# Patient Record
Sex: MALE | Race: WHITE | NOT HISPANIC OR LATINO | Employment: OTHER | URBAN - METROPOLITAN AREA
[De-identification: names, ages, dates, MRNs, and addresses within clinical notes are randomized per-mention and may not be internally consistent; named-entity substitution may affect disease eponyms.]

---

## 2017-12-01 ENCOUNTER — CONVERSION ENCOUNTER (OUTPATIENT)
Dept: FAMILY MEDICINE CLINIC | Facility: CLINIC | Age: 62
End: 2017-12-01

## 2017-12-01 LAB
ALBUMIN SERPL-MCNC: 3.2 G/DL (ref 3.6–5.1)
ALBUMIN/GLOB SERPL: ABNORMAL {RATIO} (ref 1–2.1)
ALP SERPL-CCNC: 117 UNITS/L (ref 40–115)
ALT SERPL-CCNC: 53 UNITS/L (ref 9–60)
AST SERPL-CCNC: 57 UNITS/L (ref 10–35)
BASOPHILS NFR BLD AUTO: 1 %
BILIRUB SERPL-MCNC: 0.7 MG/DL (ref 0.2–1.2)
BUN SERPL-MCNC: 13 MG/DL (ref 7–25)
BUN/CREAT SERPL: ABNORMAL (ref 6–22)
CALCIUM SERPL-MCNC: 9.2 MG/DL (ref 8.6–10.2)
CHLORIDE SERPL-SCNC: 107 MMOL/L (ref 98–110)
CHOLEST SERPL-MCNC: 87 MG/DL (ref 125–200)
CHOLEST/HDLC SERPL: ABNORMAL {RATIO}
CONV % HGB A1C: ABNORMAL %
CONV CO2: 26 MMOL/L (ref 21–33)
CONV NEUTROPHILS/100 LEUKOCYTES IN BODY FLUID BY MANUAL COUNT: 72 %
CONV TOTAL PROTEIN: 6.4 G/DL (ref 6.2–8.3)
CREAT UR-MCNC: 0.9 MG/DL (ref 0.76–1.46)
EOSINOPHIL # BLD AUTO: 1 %
EOSINOPHIL # BLD AUTO: ABNORMAL 10*3/MM3 (ref 15–500)
ERYTHROCYTE [DISTWIDTH] IN BLOOD BY AUTOMATED COUNT: 15.5 % (ref 11–15)
GLOBULIN UR ELPH-MCNC: ABNORMAL G/DL (ref 2.1–3.7)
GLUCOSE SERPL-MCNC: 99 MG/DL (ref 65–99)
HCT VFR BLD AUTO: 37.8 % (ref 38.5–50)
HDLC SERPL-MCNC: 25 MG/DL
HGB BLD-MCNC: 12.7 G/DL (ref 13.2–17.1)
LDLC SERPL CALC-MCNC: 42 MG/DL
LYMPHOCYTES # BLD AUTO: ABNORMAL 10*3/MM3 (ref 850–3900)
LYMPHOCYTES NFR BLD AUTO: 21 %
MCH RBC QN AUTO: 30.9 PG (ref 27–33)
MCHC RBC AUTO-ENTMCNC: ABNORMAL % (ref 32–36)
MCV RBC AUTO: 91.8 FL (ref 80–100)
MONOCYTES # BLD AUTO: ABNORMAL 10*3/MICROLITER (ref 200–950)
MONOCYTES NFR BLD AUTO: 6 %
NEUTROPHILS # BLD AUTO: ABNORMAL 10*3/MM3 (ref 1500–7800)
PLATELET # BLD AUTO: ABNORMAL 10*3/MM3 (ref 140–400)
PMV BLD AUTO: 8.6 FL (ref 7.5–11.5)
POTASSIUM SERPL-SCNC: 4.4 MMOL/L (ref 3.5–5.3)
PSA SERPL-MCNC: 1.3 NG/ML
RBC # BLD AUTO: ABNORMAL 10*6/MM3 (ref 4.2–5.8)
SODIUM SERPL-SCNC: 139 MMOL/L (ref 135–146)
TRIGL SERPL-MCNC: 99 MG/DL
TSH SERPL-ACNC: 3.51 MIU/L (ref 0.4–4.5)
WBC # BLD AUTO: ABNORMAL 10*3/MM3 (ref 3.8–10.8)

## 2017-12-14 LAB
CONV CREATININE URINE, RANDOM: 134 MG/DL (ref 20–370)
CONV MICROALBUM.,U,RANDOM: 1 MG/DL
HGB S BLD QL SOLY: NORMAL MCG/MG

## 2019-02-05 LAB
BASOPHILS # BLD AUTO: ABNORMAL 10*3/MM3 (ref 0–200)
BASOPHILS NFR BLD AUTO: 0.2 %
CONV NEUTROPHILS/100 LEUKOCYTES IN BODY FLUID BY MANUAL COUNT: 68.3 %
EOSINOPHIL # BLD AUTO: 1.4 %
EOSINOPHIL # BLD AUTO: ABNORMAL 10*3/MM3 (ref 15–500)
ERYTHROCYTE [DISTWIDTH] IN BLOOD BY AUTOMATED COUNT: 13.6 % (ref 11–15)
HCT VFR BLD AUTO: 39.2 % (ref 38.5–50)
HGB BLD-MCNC: 12.7 G/DL (ref 13.2–17.1)
LYMPHOCYTES # BLD AUTO: ABNORMAL 10*3/MM3 (ref 850–3900)
LYMPHOCYTES NFR BLD AUTO: 24.4 %
MCH RBC QN AUTO: 28.6 PG (ref 27–33)
MCHC RBC AUTO-ENTMCNC: ABNORMAL % (ref 32–36)
MCV RBC AUTO: 88.3 FL (ref 80–100)
MONOCYTES # BLD AUTO: ABNORMAL 10*3/MICROLITER (ref 200–950)
MONOCYTES NFR BLD AUTO: 5.7 %
NEUTROPHILS # BLD AUTO: ABNORMAL 10*3/MM3 (ref 1500–7800)
PLATELET # BLD AUTO: ABNORMAL 10*3/MM3 (ref 140–400)
PMV BLD AUTO: 10.6 FL (ref 7.5–12.5)
PSA SERPL-MCNC: 0.3 NG/ML
RBC # BLD AUTO: ABNORMAL 10*6/MM3 (ref 4.2–5.8)
WBC # BLD AUTO: ABNORMAL K/UL (ref 3.8–10.8)

## 2019-07-03 ENCOUNTER — TELEPHONE (OUTPATIENT)
Dept: FAMILY MEDICINE CLINIC | Facility: CLINIC | Age: 64
End: 2019-07-03

## 2019-07-08 RX ORDER — DICYCLOMINE HYDROCHLORIDE 10 MG/1
10 CAPSULE ORAL 4 TIMES DAILY PRN
Qty: 120 CAPSULE | Refills: 5 | Status: SHIPPED | OUTPATIENT
Start: 2019-07-08 | End: 2019-11-13 | Stop reason: SDUPTHER

## 2019-08-05 ENCOUNTER — TELEPHONE (OUTPATIENT)
Dept: FAMILY MEDICINE CLINIC | Facility: CLINIC | Age: 64
End: 2019-08-05

## 2019-08-05 RX ORDER — GLIMEPIRIDE 4 MG/1
2 TABLET ORAL 2 TIMES DAILY
Qty: 90 TABLET | Refills: 1 | Status: SHIPPED | OUTPATIENT
Start: 2019-08-05 | End: 2019-11-13 | Stop reason: SDUPTHER

## 2019-08-27 RX ORDER — HYDROCHLOROTHIAZIDE 12.5 MG/1
12.5 CAPSULE, GELATIN COATED ORAL DAILY
Qty: 30 CAPSULE | Refills: 5 | Status: SHIPPED | OUTPATIENT
Start: 2019-08-27 | End: 2019-11-13 | Stop reason: SDUPTHER

## 2019-09-03 ENCOUNTER — OFFICE VISIT (OUTPATIENT)
Dept: FAMILY MEDICINE CLINIC | Facility: CLINIC | Age: 64
End: 2019-09-03

## 2019-09-03 VITALS
DIASTOLIC BLOOD PRESSURE: 67 MMHG | BODY MASS INDEX: 41.86 KG/M2 | WEIGHT: 299 LBS | SYSTOLIC BLOOD PRESSURE: 130 MMHG | TEMPERATURE: 98 F | HEART RATE: 87 BPM | OXYGEN SATURATION: 95 % | HEIGHT: 71 IN

## 2019-09-03 DIAGNOSIS — E11.42 DIABETIC PERIPHERAL NEUROPATHY ASSOCIATED WITH TYPE 2 DIABETES MELLITUS (HCC): ICD-10-CM

## 2019-09-03 DIAGNOSIS — I73.9 PAD (PERIPHERAL ARTERY DISEASE) (HCC): Chronic | ICD-10-CM

## 2019-09-03 DIAGNOSIS — G62.9 NEUROPATHY: ICD-10-CM

## 2019-09-03 DIAGNOSIS — R60.0 EDEMA OF LOWER EXTREMITY: ICD-10-CM

## 2019-09-03 DIAGNOSIS — E78.2 MIXED HYPERLIPIDEMIA: ICD-10-CM

## 2019-09-03 DIAGNOSIS — M79.604 PAIN IN BOTH LOWER EXTREMITIES: ICD-10-CM

## 2019-09-03 DIAGNOSIS — E11.9 TYPE 2 DIABETES MELLITUS WITHOUT COMPLICATION, WITH LONG-TERM CURRENT USE OF INSULIN (HCC): Primary | ICD-10-CM

## 2019-09-03 DIAGNOSIS — M79.605 PAIN IN BOTH LOWER EXTREMITIES: ICD-10-CM

## 2019-09-03 DIAGNOSIS — D64.9 ANEMIA, UNSPECIFIED TYPE: ICD-10-CM

## 2019-09-03 DIAGNOSIS — I73.9 PERIPHERAL VASCULAR DISEASE (HCC): ICD-10-CM

## 2019-09-03 DIAGNOSIS — M79.89 LEG SWELLING: ICD-10-CM

## 2019-09-03 DIAGNOSIS — Z79.4 TYPE 2 DIABETES MELLITUS WITHOUT COMPLICATION, WITH LONG-TERM CURRENT USE OF INSULIN (HCC): Primary | ICD-10-CM

## 2019-09-03 PROBLEM — I10 HYPERTENSION, BENIGN: Status: ACTIVE | Noted: 2017-09-27

## 2019-09-03 PROBLEM — R79.89 ABNORMAL LIVER FUNCTION TESTS: Status: ACTIVE | Noted: 2017-09-27

## 2019-09-03 PROBLEM — I25.10 CORONARY ARTERY DISEASE: Status: ACTIVE | Noted: 2017-10-24

## 2019-09-03 PROBLEM — E78.5 HYPERLIPIDEMIA: Status: ACTIVE | Noted: 2017-09-27

## 2019-09-03 PROCEDURE — 99214 OFFICE O/P EST MOD 30 MIN: CPT | Performed by: NURSE PRACTITIONER

## 2019-09-03 RX ORDER — GABAPENTIN 300 MG/1
600 CAPSULE ORAL 3 TIMES DAILY
Qty: 90 CAPSULE | Refills: 3 | Status: SHIPPED | OUTPATIENT
Start: 2019-09-03 | End: 2019-11-13 | Stop reason: SDUPTHER

## 2019-09-03 NOTE — PROGRESS NOTES
Subjective   Haider Holland is a 64 y.o. male.     64-year-old white male with history of type 2 diabetes, MI, peripheral neuropathy, Bilateral PAD and chronic lower extremity edema who comes in today with complaints of worsening left lower extremity swelling.  In April I treated him for cellulitis and the redness to clear up but leg remains elevated and he does have PAD bilaterally with discoloration of skin and scaly skin.  Left lower extremity is swollen more than normal and I am doing vascular studies on the left lower extremity.  I am also increasing his gabapentin to 600 mg 3 times daily for his peripheral neuropathy  Blood sugars are normally under 130 in the morning  Blood pressure 130/66 heart rate 86 he denies any chest pain, dyspnea, tachycardia or dizziness   Patient have recent colonoscopy with polyps and is to repeat in 3 years     ABIs/  Venous Doppler left lower extremity   increase gabapentin to 600 mg three times a day         The following portions of the patient's history were reviewed and updated as appropriate: allergies, current medications, past family history, past medical history, past social history, past surgical history and problem list.    Review of Systems   Constitutional: Negative.    HENT: Negative.    Respiratory: Negative.    Cardiovascular: Positive for leg swelling.   Gastrointestinal: Negative.    Genitourinary: Negative.    Musculoskeletal: Negative.    Skin: Negative.    Neurological:        Peripheral neuropathy   Psychiatric/Behavioral: Negative.        Objective   Physical Exam   Constitutional: He is oriented to person, place, and time. He appears well-developed and well-nourished.   Cardiovascular: Normal rate and regular rhythm.   Pulmonary/Chest: Effort normal and breath sounds normal.   Abdominal: Soft. Bowel sounds are normal.   Musculoskeletal: He exhibits edema.   Swelling left lower extremity with PAD and discoloration noted bilaterally With scaly skin    Neurological: He is alert and oriented to person, place, and time.   Skin: Skin is warm and dry.   See musculoskeletal   Psychiatric: He has a normal mood and affect.         Assessment/Plan   Haider was seen today for leg swelling, leg pain and peripheral neuropathy.    Diagnoses and all orders for this visit:    Type 2 diabetes mellitus without complication, with long-term current use of insulin (CMS/[facility])  -     Comprehensive Metabolic Panel  -     Hemoglobin A1c    Anemia, unspecified type  -     CBC (No Diff)    Diabetic peripheral neuropathy associated with type 2 diabetes mellitus (CMS/[facility])  -     gabapentin (NEURONTIN) 300 MG capsule; Take 2 capsules by mouth 3 (Three) Times a Day.    Mixed hyperlipidemia  -     Lipid Panel With LDL / HDL Ratio    Pain in both lower extremities  -     Doppler Arterial Multi Level Lower Extremity - Bilateral CAR; Future  -     Duplex Venous Lower Extremity - Left CAR; Future    Leg swelling  -     Doppler Arterial Multi Level Lower Extremity - Bilateral CAR; Future  -     Duplex Venous Lower Extremity - Left CAR; Future    Peripheral vascular disease (CMS/HCC)   -     Doppler Arterial Multi Level Lower Extremity - Bilateral CAR; Future    Neuropathy    PAD (peripheral artery disease) (CMS/[facility])    Edema of lower extremity

## 2019-09-04 LAB
ALBUMIN SERPL-MCNC: 4.1 G/DL (ref 3.6–4.8)
ALBUMIN/GLOB SERPL: 1.4 {RATIO} (ref 1.2–2.2)
ALP SERPL-CCNC: 72 IU/L (ref 39–117)
ALT SERPL-CCNC: 41 IU/L (ref 0–44)
AST SERPL-CCNC: 46 IU/L (ref 0–40)
BILIRUB SERPL-MCNC: 0.8 MG/DL (ref 0–1.2)
BUN SERPL-MCNC: 16 MG/DL (ref 8–27)
BUN/CREAT SERPL: 14 (ref 10–24)
CALCIUM SERPL-MCNC: 9.6 MG/DL (ref 8.6–10.2)
CHLORIDE SERPL-SCNC: 104 MMOL/L (ref 96–106)
CHOLEST SERPL-MCNC: 137 MG/DL (ref 100–199)
CO2 SERPL-SCNC: 20 MMOL/L (ref 20–29)
CREAT SERPL-MCNC: 1.16 MG/DL (ref 0.76–1.27)
ERYTHROCYTE [DISTWIDTH] IN BLOOD BY AUTOMATED COUNT: 15.7 % (ref 12.3–15.4)
GLOBULIN SER CALC-MCNC: 3 G/DL (ref 1.5–4.5)
GLUCOSE SERPL-MCNC: 103 MG/DL (ref 65–99)
HBA1C MFR BLD: 6.1 % (ref 4.8–5.6)
HCT VFR BLD AUTO: 40.6 % (ref 37.5–51)
HDLC SERPL-MCNC: 32 MG/DL
HGB BLD-MCNC: 13.4 G/DL (ref 13–17.7)
LDLC SERPL CALC-MCNC: 62 MG/DL (ref 0–99)
LDLC/HDLC SERPL: 1.9 RATIO (ref 0–3.6)
MCH RBC QN AUTO: 29.6 PG (ref 26.6–33)
MCHC RBC AUTO-ENTMCNC: 33 G/DL (ref 31.5–35.7)
MCV RBC AUTO: 90 FL (ref 79–97)
PLATELET # BLD AUTO: 156 X10E3/UL (ref 150–450)
POTASSIUM SERPL-SCNC: 4.7 MMOL/L (ref 3.5–5.2)
PROT SERPL-MCNC: 7.1 G/DL (ref 6–8.5)
RBC # BLD AUTO: 4.53 X10E6/UL (ref 4.14–5.8)
SODIUM SERPL-SCNC: 140 MMOL/L (ref 134–144)
TRIGL SERPL-MCNC: 215 MG/DL (ref 0–149)
VLDLC SERPL CALC-MCNC: 43 MG/DL (ref 5–40)
WBC # BLD AUTO: 6.5 X10E3/UL (ref 3.4–10.8)

## 2019-11-05 DIAGNOSIS — M79.605 PAIN IN BOTH LOWER EXTREMITIES: ICD-10-CM

## 2019-11-05 DIAGNOSIS — M79.604 PAIN IN BOTH LOWER EXTREMITIES: ICD-10-CM

## 2019-11-05 DIAGNOSIS — M79.89 LEG SWELLING: ICD-10-CM

## 2019-11-05 DIAGNOSIS — I73.9 PERIPHERAL VASCULAR DISEASE (HCC): ICD-10-CM

## 2019-11-13 DIAGNOSIS — E11.42 DIABETIC PERIPHERAL NEUROPATHY ASSOCIATED WITH TYPE 2 DIABETES MELLITUS (HCC): ICD-10-CM

## 2019-11-13 RX ORDER — HYDROCHLOROTHIAZIDE 12.5 MG/1
12.5 CAPSULE, GELATIN COATED ORAL DAILY
Qty: 90 CAPSULE | Refills: 1 | Status: SHIPPED | OUTPATIENT
Start: 2019-11-13 | End: 2019-12-13

## 2019-11-13 RX ORDER — GLIMEPIRIDE 2 MG/1
2 TABLET ORAL 2 TIMES DAILY
Qty: 180 TABLET | Refills: 1 | Status: SHIPPED | OUTPATIENT
Start: 2019-11-13 | End: 2020-01-13 | Stop reason: SDUPTHER

## 2019-11-13 RX ORDER — DICYCLOMINE HYDROCHLORIDE 10 MG/1
10 CAPSULE ORAL 4 TIMES DAILY PRN
Qty: 360 CAPSULE | Refills: 1 | Status: SHIPPED | OUTPATIENT
Start: 2019-11-13 | End: 2020-01-13 | Stop reason: SDUPTHER

## 2019-11-13 RX ORDER — LISINOPRIL 10 MG/1
10 TABLET ORAL DAILY
Qty: 90 TABLET | Refills: 1 | Status: SHIPPED | OUTPATIENT
Start: 2019-11-13 | End: 2020-01-13 | Stop reason: SDUPTHER

## 2019-11-13 RX ORDER — ASPIRIN 81 MG/1
81 TABLET ORAL 2 TIMES DAILY
Qty: 180 TABLET | Refills: 1 | Status: SHIPPED | OUTPATIENT
Start: 2019-11-13 | End: 2020-01-13 | Stop reason: SDUPTHER

## 2019-11-13 RX ORDER — GABAPENTIN 300 MG/1
600 CAPSULE ORAL 3 TIMES DAILY
Qty: 540 CAPSULE | Refills: 1 | Status: SHIPPED | OUTPATIENT
Start: 2019-11-13 | End: 2020-01-20

## 2019-11-19 ENCOUNTER — TELEPHONE (OUTPATIENT)
Dept: FAMILY MEDICINE CLINIC | Facility: CLINIC | Age: 64
End: 2019-11-19

## 2019-11-26 ENCOUNTER — OFFICE VISIT (OUTPATIENT)
Dept: ENDOCRINOLOGY | Facility: CLINIC | Age: 64
End: 2019-11-26

## 2019-11-26 VITALS
WEIGHT: 305 LBS | SYSTOLIC BLOOD PRESSURE: 132 MMHG | HEIGHT: 71 IN | BODY MASS INDEX: 42.7 KG/M2 | DIASTOLIC BLOOD PRESSURE: 75 MMHG | HEART RATE: 77 BPM | OXYGEN SATURATION: 97 %

## 2019-11-26 DIAGNOSIS — E11.42 DIABETIC PERIPHERAL NEUROPATHY ASSOCIATED WITH TYPE 2 DIABETES MELLITUS (HCC): ICD-10-CM

## 2019-11-26 DIAGNOSIS — I10 HYPERTENSION, BENIGN: ICD-10-CM

## 2019-11-26 DIAGNOSIS — E11.65 UNCONTROLLED TYPE 2 DIABETES MELLITUS WITH HYPERGLYCEMIA (HCC): Primary | ICD-10-CM

## 2019-11-26 DIAGNOSIS — E78.2 MIXED HYPERLIPIDEMIA: ICD-10-CM

## 2019-11-26 PROCEDURE — 99214 OFFICE O/P EST MOD 30 MIN: CPT | Performed by: INTERNAL MEDICINE

## 2019-11-26 PROCEDURE — G0009 ADMIN PNEUMOCOCCAL VACCINE: HCPCS | Performed by: INTERNAL MEDICINE

## 2019-11-26 PROCEDURE — 90670 PCV13 VACCINE IM: CPT | Performed by: INTERNAL MEDICINE

## 2019-11-26 NOTE — PROGRESS NOTES
Endocrine Progress Note Outpatient     Patient Care Team:  Chantel Pineda APRN as PCP - General    Chief Complaint: Follow up type 2 diabetes    HPI: 64-year-old male with history of type 2 diabetes, hypertension, hyperlipidemia and obesity is here for follow-up.  For type 2 diabetes he is currently on Janumet XR 50/thousand, 2 tablets p.o. daily, glimepiride 2 mg twice a day, Invokana 100 medium daily and Humalog mix 75/2520 units twice a day.  He did bring in blood sugar records for review and they are running mostly between 100-1 60.  He does not have low blood sugars.  Is trying his best for his diet.  He has not gotten flu or pneumonia vaccine.  He had an eye exam 2 weeks ago.  Hypertension: Well-controlled  Hyperlipidemia: Is currently not taking any cholesterol-lowering medications.  Obesity: He is advised to continue to work on his diet and activity.    Past Medical History:   Diagnosis Date   • Abnormal liver function tests 09/27/2017   • Bilateral lower extremity edema 02/05/2019   • BMI 40.0-44.9, adult (CMS/[facility]) 02/05/2019   • CAD (coronary artery disease) 10/24/2017   • Cellulitis of leg, right 04/02/2019   • Chest pain 10/24/2017   • Cough 02/06/2018   • Diabetes mellitus, type II (CMS/[facility]) 10/24/2017   • Hyperlipidemia 09/27/2017   • Hypertension, benign 09/27/2017   • MI (myocardial infarction) (CMS/[facility]) 2008   • Neuropathy 08/07/2018   • Peripheral neuropathy    • Screening for colon cancer 02/05/2019   • Screening PSA (prostate specific antigen) 02/05/2019   • Skin abscess 09/18/2018   • Sore throat 02/06/2018   • Special screening for malignant neoplasm of prostate 11/30/2017   • Type 2 diabetes, uncontrolled, with neuropathy (CMS/[facility]) 09/27/2017    DM typeII with peripheral neuropathy uncontrolled   • URI, acute 02/06/2018   • Well adult exam 11/30/2017       Social History     Socioeconomic History   • Marital status:      Spouse name: Not on file   • Number of children: Not on file   •  Years of education: Not on file   • Highest education level: Not on file   Tobacco Use   • Smoking status: Never Smoker   Substance and Sexual Activity   • Alcohol use: No     Frequency: Never   • Drug use: No       Family History   Problem Relation Age of Onset   • Diabetes Mother    • Heart disease Mother    • Hypertension Mother    • Heart disease Father    • Diabetes Brother        No Known Allergies    ROS:   Constitutional:  Denies fatigue, tiredness.    Eyes:  Denies change in visual acuity   HENT:  Denies nasal congestion or sore throat   Respiratory: denies cough, shortness of breath.   Cardiovascular:  denies chest pain, edema   GI:  Denies abdominal pain, nausea, vomiting.   Musculoskeletal:  Denies back pain or joint pain   Integument:  Denies dry skin and rash   Neurologic:  Denies headache, focal weakness or sensory changes   Endocrine:  Denies polyuria or polydipsia   Psychiatric:  Denies depression or anxiety      Vitals:    11/26/19 1517   BP: 132/75   Pulse: 77   SpO2: 97%       Physical Exam:  GEN: NAD, conversant, obese  EYES: EOMI, PERRL, no conjunctival erythema  NECK: no thyromegaly, full ROM   CV: RRR, no murmurs/rubs/gallops, no peripheral edema  LUNG: CTAB, no wheezes/rales/ronchi  SKIN: no rashes, no acanthosis  MSK: no deformities, full ROM of all extremities  NEURO: no tremors, DTR normal  PSYCH: AOX3, appropriate mood, affect normal  Feet: Has calluses on both feet, no ulcer      Results Review:     I reviewed the patient's new clinical results.    Lab Results   Component Value Date    HGBA1C 6.1 (H) 09/03/2019      Lab Results   Component Value Date    GLUCOSE 99 12/01/2017    BUN 16 09/03/2019    CREATININE 1.16 09/03/2019    EGFRIFNONA 66 09/03/2019    EGFRIFAFRI 77 09/03/2019    BCR 14 09/03/2019    K 4.7 09/03/2019    CO2 20 09/03/2019    CALCIUM 9.6 09/03/2019    PROTENTOTREF 7.1 09/03/2019    ALBUMIN 4.1 09/03/2019    LABIL2 1.4 09/03/2019    AST 46 (H) 09/03/2019    ALT 41  09/03/2019    CHOL 87 (L) 12/01/2017    TRIG 215 (H) 09/03/2019    LDL 62 09/03/2019    HDL 32 (L) 09/03/2019     Lab Results   Component Value Date    TSH 3.51 12/01/2017         Medication Review: Reviewed.       Current Outpatient Medications:   •  aspirin (ASPIR) 81 MG EC tablet, Take 1 tablet by mouth 2 (Two) Times a Day. aspir-81  81MG oral tablet delayed release, Disp: 180 tablet, Rfl: 1  •  Canagliflozin (INVOKANA) 100 MG tablet, Take 1 tablet by mouth Daily., Disp: 90 tablet, Rfl: 1  •  dicyclomine (BENTYL) 10 MG capsule, Take 1 capsule by mouth 4 (Four) Times a Day As Needed (1-4 times a day as needed)., Disp: 360 capsule, Rfl: 1  •  gabapentin (NEURONTIN) 300 MG capsule, Take 2 capsules by mouth 3 (Three) Times a Day., Disp: 540 capsule, Rfl: 1  •  glimepiride (AMARYL) 2 MG tablet, Take 1 tablet by mouth 2 (Two) Times a Day. (Patient taking differently: Take 4 mg by mouth 2 (Two) Times a Day. Takes tablet twice a day), Disp: 180 tablet, Rfl: 1  •  glucose blood test strip, Check BS twice a day, Disp: 300 each, Rfl: 1  •  hydroCHLOROthiazide (MICROZIDE) 12.5 MG capsule, Take 1 capsule by mouth Daily., Disp: 90 capsule, Rfl: 1  •  insulin lispro protamine-insulin lispro (humaLOG 75-25) (75-25) 100 UNIT/ML suspension injection, Inject 25 Units under the skin into the appropriate area as directed 2 (Two) Times a Day With Meals., Disp: 15 mL, Rfl: 1  •  Insulin Pen Needle 32G X 4 MM misc, Use as directed with Insluin, Disp: 300 each, Rfl: 1  •  lisinopril (PRINIVIL,ZESTRIL) 10 MG tablet, Take 1 tablet by mouth Daily., Disp: 90 tablet, Rfl: 1  •  SITagliptin-metFORMIN HCl ER (JANUMET XR)  MG tablet, Take 1 tablet by mouth 2 (Two) Times a Day., Disp: , Rfl:       Assessment/Plan   1.  Diabetes mellitus type 2: Excellent control with A1c of 6.4%.  I have reviewed his blood sugar records.  At this time I will continue his current regimen and follow blood sugars and A1c.  I advised him to get annual eye exam  and flu vaccine however he does not want to get a flu shot but he is interested in getting pneumonia vaccine today.  Advised to always keep glucose source with him in case of low blood sugar.  2.  Hypertension: Well-controlled, continue current medication  3.  Hyperlipidemia his LDL is only 60, I will add atorvastatin 10 mg p.o. daily.  4.  Obesity: Advised to continue work on diet and activity and try to lose some weight.            Tono Allison MD FACE.    Much of the above report is an electronic transcription/translation of the spoken language to printed text using Dragon Software. As such, the subtleties and finesse of the spoken language may permit erroneous, or at times, nonsensical words or phrases to be inadvertently transcribed; thus changes may be made at a later date to rectify these errors.

## 2019-11-26 NOTE — PATIENT INSTRUCTIONS
Renew current medications  Always keep glucose source with you in case of low blood sugar  Follow-up in 6 months with labs  Always follow diet and activity  Always get annual eye exam.

## 2019-12-13 ENCOUNTER — OFFICE VISIT (OUTPATIENT)
Dept: FAMILY MEDICINE CLINIC | Facility: CLINIC | Age: 64
End: 2019-12-13

## 2019-12-13 VITALS
HEART RATE: 83 BPM | BODY MASS INDEX: 43.4 KG/M2 | OXYGEN SATURATION: 95 % | TEMPERATURE: 98 F | HEIGHT: 71 IN | DIASTOLIC BLOOD PRESSURE: 70 MMHG | WEIGHT: 310 LBS | RESPIRATION RATE: 20 BRPM | SYSTOLIC BLOOD PRESSURE: 131 MMHG

## 2019-12-13 DIAGNOSIS — E11.42 DIABETIC PERIPHERAL NEUROPATHY ASSOCIATED WITH TYPE 2 DIABETES MELLITUS (HCC): Primary | ICD-10-CM

## 2019-12-13 DIAGNOSIS — I25.10 CORONARY ARTERY DISEASE INVOLVING NATIVE CORONARY ARTERY OF NATIVE HEART, ANGINA PRESENCE UNSPECIFIED: ICD-10-CM

## 2019-12-13 DIAGNOSIS — I10 HYPERTENSION, BENIGN: ICD-10-CM

## 2019-12-13 DIAGNOSIS — E11.65 UNCONTROLLED TYPE 2 DIABETES MELLITUS WITH HYPERGLYCEMIA (HCC): ICD-10-CM

## 2019-12-13 DIAGNOSIS — R60.0 EDEMA OF LOWER EXTREMITY: ICD-10-CM

## 2019-12-13 PROBLEM — Z86.010 HISTORY OF COLON POLYPS: Status: ACTIVE | Noted: 2019-12-13

## 2019-12-13 PROBLEM — Z86.0100 HISTORY OF COLON POLYPS: Status: ACTIVE | Noted: 2019-12-13

## 2019-12-13 PROCEDURE — 99214 OFFICE O/P EST MOD 30 MIN: CPT | Performed by: FAMILY MEDICINE

## 2019-12-13 RX ORDER — INSULIN LISPRO 100 [IU]/ML
INJECTION, SUSPENSION SUBCUTANEOUS
Qty: 15 ML | Refills: 10 | Status: SHIPPED | OUTPATIENT
Start: 2019-12-13 | End: 2020-11-18

## 2019-12-13 RX ORDER — GABAPENTIN 600 MG/1
600 TABLET ORAL
Qty: 120 TABLET | Refills: 1 | Status: SHIPPED | OUTPATIENT
Start: 2019-12-13 | End: 2020-01-13 | Stop reason: SDUPTHER

## 2019-12-13 RX ORDER — BUMETANIDE 1 MG/1
1 TABLET ORAL DAILY
Qty: 30 TABLET | Refills: 0 | Status: SHIPPED | OUTPATIENT
Start: 2019-12-13 | End: 2020-01-13 | Stop reason: SDUPTHER

## 2019-12-30 DIAGNOSIS — R60.0 EDEMA OF LOWER EXTREMITY: ICD-10-CM

## 2020-01-11 NOTE — PROGRESS NOTES
Subjective   Haider Holland is a 64 y.o. male.   Chief Complaint   Patient presents with   • Follow-up     Follow up on testing for Carotid & Echo        History of Present Illness   Presents to the office today to follow-up on swelling, numbness in his feet and legs.  He had a 2D echocardiogram done at [place] on January 3.  It showed mild tricuspid regurgitation, calcified aortic valve with mild aortic stenosis, ejection fraction of 60%, no pericardial effusion, apparently no obvious diastolic dysfunction.    Lower extremity venous Doppler was negative for DVT.    Again his diabetes is managed by Dr. Allison.  It looks like labs were ordered for late November, but there are no results.  Last renal function was normal.      Last visit, we increased gabapentin and did trial of bumex.  Weight up 2 pounds since last visit, but he says the swelling in his feet is a lot better.  Increase in gabapentin has helped a lot.  The burning pain and numbness in his feet and legs has decreased.      Patient Active Problem List    Diagnosis Date Noted   • Mild nonproliferative diabetic retinopathy without macular edema associated with type 2 diabetes mellitus (CMS/[facility]) 01/20/2020     Note Last Updated: 1/20/2020     Dr. Mcleod  Local opto - Dr. 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     • Body mass index (BMI) of 40.0-44.9 in adult (CMS/[facility]) 02/05/2019   • Edema of lower extremity 02/05/2019   • Neuropathy 08/07/2018   • Coronary artery disease 10/24/2017     Note Last Updated: 12/13/2019     MI in 2008 and 2012.  Had cath with stent - 2017     • Uncontrolled type 2 diabetes mellitus with hyperglycemia (CMS/[facility])  10/24/2017     Note Last Updated: 1/11/2020     Managed by endocrinology     • Abnormal liver function tests 09/27/2017   • Diabetic peripheral neuropathy associated with type 2 diabetes mellitus (CMS/HCC) 09/27/2017   • Mixed hyperlipidemia 09/27/2017   • Hypertension, benign 09/27/2017           Past Surgical History:   Procedure Laterality Date   • CARDIAC CATHETERIZATION  11/27/2017, 2008    with stent in 2017   • CHOLECYSTECTOMY  12/11/2017   • EYE SURGERY  2019   • OTHER SURGICAL HISTORY      rt arm sx     Current Outpatient Medications on File Prior to Visit   Medication Sig   • aspirin (ASPIR) 81 MG EC tablet Take 1 tablet by mouth 2 (Two) Times a Day. aspir-81  81MG oral tablet delayed release   • bumetanide (BUMEX) 1 MG tablet Take 1 tablet by mouth Daily.   • Canagliflozin (INVOKANA) 100 MG tablet Take 1 tablet by mouth Daily.   • dicyclomine (BENTYL) 10 MG capsule Take 1 capsule by mouth 4 (Four) Times a Day As Needed (1-4 times a day as needed).   • gabapentin (NEURONTIN) 600 MG tablet Take 1 tablet by mouth 4 (Four) Times a Day.   • glimepiride (AMARYL) 2 MG tablet Take 1 tablet by mouth 2 (Two) Times a Day.   • glucose blood test strip Check BS twice a day   • HUMALOG MIX 75/25 KWIKPEN (75-25) 100 UNIT/ML suspension pen-injector pen INJECT 25 UNITS SUBCUTANEOUSLY INTO THE APPROPRIATE AREA AS DIRECTED TWICE DAILY WITH MEALS   • Insulin Pen Needle 32G X 4 MM misc Use as directed with Insluin   • lisinopril (PRINIVIL,ZESTRIL) 10 MG tablet Take 1 tablet by mouth Daily.   • SITagliptin-metFORMIN HCl ER (JANUMET XR)  MG tablet Take 1 tablet by mouth 2 (Two) Times a Day.   • [DISCONTINUED] gabapentin (NEURONTIN) 300 MG capsule Take 2 capsules by mouth 3 (Three) Times a Day.     No current facility-administered medications on file prior to visit.      No Known Allergies  Social History     Socioeconomic History   • Marital status:      Spouse name: Not on file   • Number of children: Not on file  "  • Years of education: Not on file   • Highest education level: Not on file   Tobacco Use   • Smoking status: Never Smoker   Substance and Sexual Activity   • Alcohol use: No     Frequency: Never   • Drug use: No     Family History   Problem Relation Age of Onset   • Diabetes Mother    • Heart disease Mother    • Hypertension Mother    • Heart disease Father    • Diabetes Brother      The following portions of the patient's history were reviewed and updated as appropriate: allergies, current medications, past family history, past surgical history and problem list.    Review of Systems   Constitutional: Negative for chills and fever.   Respiratory: Negative for shortness of breath.    Cardiovascular: Negative for chest pain.   Gastrointestinal: Negative for abdominal pain.       Objective   /69 (BP Location: Right arm, Patient Position: Sitting, Cuff Size: Large Adult)   Pulse 83   Temp 97.5 °F (36.4 °C) (Oral)   Resp 20   Ht 180.3 cm (71\")   Wt (!) 142 kg (312 lb 3.2 oz)   SpO2 97%   BMI 43.54 kg/m²   Physical Exam   Constitutional: He is oriented to person, place, and time. He appears well-developed and well-nourished.  Non-toxic appearance. No distress. He is obese.  HENT:   Head: Normocephalic and atraumatic.   Eyes: Conjunctivae and EOM are normal.   Neck: Normal range of motion. No JVD present.   Cardiovascular: Normal rate and regular rhythm. Exam reveals decreased pulses (in bilateral feet). Exam reveals no distant heart sounds.   Murmur heard.   Systolic (RUSB - loudest) murmur is present with a grade of 3/6.  Pulmonary/Chest: Effort normal. No respiratory distress. He has decreased breath sounds (o/w clear). He has no wheezes. He has no rhonchi. He has no rales.   Musculoskeletal: Normal range of motion. He exhibits edema (still with trace piting edema up to knees bilaterally- improved).   Extraordinarily dry skin on bilateral legs   Neurological: He is alert and oriented to person, place, and " time.   Skin: Skin is warm and dry. No rash noted.   Psychiatric: He has a normal mood and affect. His behavior is normal.               Assessment/Plan   Diagnoses and all orders for this visit:    1. Edema of lower extremity (Primary)  -     Basic metabolic panel; Future    2. Diabetic peripheral neuropathy associated with type 2 diabetes mellitus (CMS/HCC)    3. Coronary artery disease involving native coronary artery of native heart, angina presence unspecified    4. 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  Does have DPN.  Suspect this is the cause of the burning and numbness in his legs.  I suspect the swelling is due to venous insufficiency.  He is 312 pounds.  His last labs from September 2019 showed normal renal function.  We will continue both the Bumex once daily and the gabapentin 4 times daily.  Start lotion to bilateral legs.   Get BMP before next visit in 1 month  Keep f/u with endo for mgt of DM       Return in about 4 weeks (around 2/17/2020).    Call with any problems or concerns before next visit

## 2020-01-13 DIAGNOSIS — R60.0 EDEMA OF LOWER EXTREMITY: ICD-10-CM

## 2020-01-13 DIAGNOSIS — E11.42 DIABETIC PERIPHERAL NEUROPATHY ASSOCIATED WITH TYPE 2 DIABETES MELLITUS (HCC): ICD-10-CM

## 2020-01-13 RX ORDER — BUMETANIDE 1 MG/1
1 TABLET ORAL DAILY
Qty: 90 TABLET | Refills: 1 | Status: SHIPPED | OUTPATIENT
Start: 2020-01-13 | End: 2020-07-06 | Stop reason: SDUPTHER

## 2020-01-13 RX ORDER — GABAPENTIN 600 MG/1
600 TABLET ORAL 4 TIMES DAILY
Qty: 120 TABLET | Refills: 1 | Status: SHIPPED | OUTPATIENT
Start: 2020-01-13 | End: 2020-08-10 | Stop reason: SDUPTHER

## 2020-01-13 RX ORDER — ASPIRIN 81 MG/1
81 TABLET ORAL 2 TIMES DAILY
Qty: 180 TABLET | Refills: 1 | Status: SHIPPED | OUTPATIENT
Start: 2020-01-13 | End: 2022-05-26

## 2020-01-13 RX ORDER — LISINOPRIL 10 MG/1
10 TABLET ORAL DAILY
Qty: 90 TABLET | Refills: 1 | Status: SHIPPED | OUTPATIENT
Start: 2020-01-13 | End: 2020-05-15

## 2020-01-13 RX ORDER — GLIMEPIRIDE 2 MG/1
2 TABLET ORAL 2 TIMES DAILY
Qty: 180 TABLET | Refills: 1 | Status: SHIPPED | OUTPATIENT
Start: 2020-01-13 | End: 2020-04-10

## 2020-01-13 RX ORDER — DICYCLOMINE HYDROCHLORIDE 10 MG/1
10 CAPSULE ORAL 4 TIMES DAILY PRN
Qty: 360 CAPSULE | Refills: 1 | Status: SHIPPED | OUTPATIENT
Start: 2020-01-13 | End: 2020-04-10

## 2020-01-13 NOTE — TELEPHONE ENCOUNTER
Patient came into office and needs all meds except Janumet XR refilled. He also needs the increased dose of Gabapentin refilled. Meds are loaded and ready to send.     Last OV: 12-  Next OV: 1-  Last Labs: 11-

## 2020-01-14 DIAGNOSIS — E11.65 UNCONTROLLED TYPE 2 DIABETES MELLITUS WITH HYPERGLYCEMIA (HCC): Primary | ICD-10-CM

## 2020-01-20 ENCOUNTER — OFFICE VISIT (OUTPATIENT)
Dept: FAMILY MEDICINE CLINIC | Facility: CLINIC | Age: 65
End: 2020-01-20

## 2020-01-20 VITALS
WEIGHT: 312.2 LBS | SYSTOLIC BLOOD PRESSURE: 129 MMHG | DIASTOLIC BLOOD PRESSURE: 69 MMHG | RESPIRATION RATE: 20 BRPM | HEART RATE: 83 BPM | BODY MASS INDEX: 43.71 KG/M2 | OXYGEN SATURATION: 97 % | TEMPERATURE: 97.5 F | HEIGHT: 71 IN

## 2020-01-20 DIAGNOSIS — E11.42 DIABETIC PERIPHERAL NEUROPATHY ASSOCIATED WITH TYPE 2 DIABETES MELLITUS (HCC): ICD-10-CM

## 2020-01-20 DIAGNOSIS — I25.10 CORONARY ARTERY DISEASE INVOLVING NATIVE CORONARY ARTERY OF NATIVE HEART, ANGINA PRESENCE UNSPECIFIED: ICD-10-CM

## 2020-01-20 DIAGNOSIS — E11.628 DIABETIC DERMOPATHY ASSOCIATED WITH TYPE 2 DIABETES MELLITUS (HCC): ICD-10-CM

## 2020-01-20 DIAGNOSIS — R60.0 EDEMA OF LOWER EXTREMITY: Primary | ICD-10-CM

## 2020-01-20 PROBLEM — E11.3299 MILD NONPROLIFERATIVE DIABETIC RETINOPATHY WITHOUT MACULAR EDEMA ASSOCIATED WITH TYPE 2 DIABETES MELLITUS: Status: ACTIVE | Noted: 2020-01-20

## 2020-01-20 PROCEDURE — 99214 OFFICE O/P EST MOD 30 MIN: CPT | Performed by: FAMILY MEDICINE

## 2020-02-14 DIAGNOSIS — R60.0 EDEMA OF LOWER EXTREMITY: ICD-10-CM

## 2020-02-15 LAB
BUN SERPL-MCNC: 14 MG/DL (ref 8–27)
BUN/CREAT SERPL: 14 (ref 10–24)
CALCIUM SERPL-MCNC: 9.1 MG/DL (ref 8.6–10.2)
CHLORIDE SERPL-SCNC: 106 MMOL/L (ref 96–106)
CO2 SERPL-SCNC: 24 MMOL/L (ref 20–29)
CREAT SERPL-MCNC: 1.01 MG/DL (ref 0.76–1.27)
GLUCOSE SERPL-MCNC: 133 MG/DL (ref 65–99)
POTASSIUM SERPL-SCNC: 4.4 MMOL/L (ref 3.5–5.2)
SODIUM SERPL-SCNC: 143 MMOL/L (ref 134–144)

## 2020-02-17 ENCOUNTER — OFFICE VISIT (OUTPATIENT)
Dept: FAMILY MEDICINE CLINIC | Facility: CLINIC | Age: 65
End: 2020-02-17

## 2020-02-17 VITALS
HEART RATE: 70 BPM | HEIGHT: 71 IN | RESPIRATION RATE: 20 BRPM | WEIGHT: 308 LBS | SYSTOLIC BLOOD PRESSURE: 110 MMHG | BODY MASS INDEX: 43.12 KG/M2 | TEMPERATURE: 97.8 F | DIASTOLIC BLOOD PRESSURE: 63 MMHG | OXYGEN SATURATION: 97 %

## 2020-02-17 DIAGNOSIS — E11.42 DIABETIC PERIPHERAL NEUROPATHY ASSOCIATED WITH TYPE 2 DIABETES MELLITUS (HCC): Primary | ICD-10-CM

## 2020-02-17 DIAGNOSIS — M25.512 ACUTE PAIN OF LEFT SHOULDER: ICD-10-CM

## 2020-02-17 DIAGNOSIS — R60.0 EDEMA OF LOWER EXTREMITY: ICD-10-CM

## 2020-02-17 DIAGNOSIS — I87.2 VENOUS INSUFFICIENCY OF BOTH LOWER EXTREMITIES: ICD-10-CM
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
## 2020-02-17 DIAGNOSIS — R07.89 LEFT-SIDED CHEST WALL PAIN: ICD-10-CM

## 2020-02-17 PROCEDURE — 99214 OFFICE O/P EST MOD 30 MIN: CPT | Performed by: FAMILY MEDICINE

## 2020-02-17 NOTE — PROGRESS NOTES
Subjective   Haider Holland is a 64 y.o. male.   Chief Complaint   Patient presents with   • Leg Pain     Not hurting as bad; BLE is swollen, but not as bad.    • Follow-up     Follow up BMP result.    • Fall     Fell 1 week ago; (L) shoulder and (L) leg are hurting. No imaging; No hospital/ER visit.        History of Present Illness   Presents today for follow-up on pain and swelling in his legs.  Please see previous notes for details, but the pain appears to be on the basis of diabetic neuropathy.  At the last visit we increased his gabapentin.  His legs do not hurt nearly as bad after the increase in gabapentin at the last visit.  Can walk further before becomes painful.      Swelling in his legs appears to be on the basis of venous insufficiency.  He remains on daily Bumex.  Just had a BMP done 3 days ago.  Creatinine is stable at 1 with a GFR of 78.  Electrolytes are all normal potassium was 4.4.  It would certainly appear that he is tolerating the Bumex well.  Voiding a lot more.  Left leg still a little more swollen than right.      He fell a week ago on ice and landed on his left hip and shoulder.   No work-up for this.  Since then, pain with any movement.  Cannot abduct left arm.  Cannot cross left arm over body.    ? H/o dislocation of shoulder years ago.  Also has some tenderness along his left anterior chest wall.  Hip does not hurt now.  Able to walk without problems.      Patient Active Problem List    Diagnosis Date Noted   • Venous insufficiency of both lower extremities 02/17/2020   • Mild nonproliferative diabetic retinopathy without macular edema associated with type 2 diabetes mellitus (CMS/HCC) 01/20/2020     Note Last Updated: 1/20/2020     Dr. Mcleod  [facility] opto - Dr. 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     • Body mass index (BMI) of 40.0-44.9 in adult (CMS/[facility]) 02/05/2019   • Edema of lower extremity 02/05/2019   • Neuropathy 08/07/2018   • Coronary artery disease 10/24/2017     Note Last Updated: 12/13/2019     MI in 2008 and 2012.  Had cath with stent - 2017     • Uncontrolled type 2 diabetes mellitus with hyperglycemia (CMS/[facility]) 10/24/2017     Note Last Updated: 1/11/2020     Managed by endocrinology     • Abnormal liver function tests 09/27/2017   • Diabetic peripheral neuropathy associated with type 2 diabetes mellitus (CMS/[facility]) 09/27/2017   • Mixed hyperlipidemia 09/27/2017   • Hypertension, benign 09/27/2017           Past Surgical History:   Procedure Laterality Date   • CARDIAC CATHETERIZATION  11/27/2017, 2008    with stent in 2017   • CHOLECYSTECTOMY  12/11/2017   • EYE SURGERY  2019   • OTHER SURGICAL HISTORY      rt arm sx     Current Outpatient Medications on File Prior to Visit   Medication Sig   • aspirin (ASPIR) 81 MG EC tablet Take 1 tablet by mouth 2 (Two) Times a Day. aspir-81  81MG oral tablet delayed release   • bumetanide (BUMEX) 1 MG tablet Take 1 tablet by mouth Daily.   • Canagliflozin (INVOKANA) 100 MG tablet Take 1 tablet by mouth Daily.   • dicyclomine (BENTYL) 10 MG capsule Take 1 capsule by mouth 4 (Four) Times a Day As Needed (1-4 times a day as needed).   • gabapentin (NEURONTIN) 600 MG tablet Take 1 tablet by mouth 4 (Four) Times a Day.   • glimepiride (AMARYL) 2 MG tablet Take 1 tablet by mouth 2 (Two) Times a Day.   • glucose blood test strip Check BS twice a day   • HUMALOG MIX 75/25 KWIKPEN (75-25) 100 UNIT/ML suspension pen-injector pen INJECT 25 UNITS SUBCUTANEOUSLY INTO THE APPROPRIATE AREA AS DIRECTED TWICE DAILY WITH MEALS   • Insulin Pen Needle 32G X 4 MM misc Use as directed with Insluin   • lisinopril (PRINIVIL,ZESTRIL) 10 MG tablet Take 1  "tablet by mouth Daily.   • SITagliptin-metFORMIN HCl ER (JANUMET XR)  MG tablet Take 1 tablet by mouth 2 (Two) Times a Day.     No current facility-administered medications on file prior to visit.      No Known Allergies  Social History     Socioeconomic History   • Marital status:      Spouse name: Not on file   • Number of children: Not on file   • Years of education: Not on file   • Highest education level: Not on file   Tobacco Use   • Smoking status: Never Smoker   • Smokeless tobacco: Never Used   Substance and Sexual Activity   • Alcohol use: No     Frequency: Never   • Drug use: No   • Sexual activity: Defer     Family History   Problem Relation Age of Onset   • Diabetes Mother    • Heart disease Mother    • Hypertension Mother    • Heart disease Father    • Diabetes Brother      The following portions of the patient's history were reviewed and updated as appropriate: allergies, current medications, past family history, past medical history, past social history, past surgical history and problem list.    Review of Systems   Constitutional: Negative for chills and fever.   Respiratory: Negative for shortness of breath.    Cardiovascular: Negative for chest pain.   Gastrointestinal: Negative for abdominal pain.       Objective   /63 (BP Location: Right arm, Patient Position: Sitting, Cuff Size: Large Adult)   Pulse 70   Temp 97.8 °F (36.6 °C) (Oral)   Resp 20   Ht 180.3 cm (71\")   Wt (!) 140 kg (308 lb)   SpO2 97%   BMI 42.96 kg/m²   Physical Exam   Constitutional: He is oriented to person, place, and time. He appears well-developed and well-nourished.  Non-toxic appearance. No distress. He is obese.  HENT:   Head: Normocephalic and atraumatic.   Eyes: Conjunctivae and EOM are normal.   Neck: Normal range of motion. No JVD present.   Cardiovascular: Normal rate and regular rhythm. Exam reveals no distant heart sounds.   Murmur heard.   Systolic (RUSB - loudest) murmur is present with " a grade of 3/6.  Pulmonary/Chest: Effort normal. No respiratory distress. He has decreased breath sounds. He has no wheezes. He has no rhonchi. He has no rales.   Musculoskeletal: Normal range of motion. He exhibits edema (still with trace piting edema up to right knee and just a bit more edema up to the knee on the left.).   Tenderness to palpation around the left shoulder.  Positive impingement sign.  Extreme limitation of ability to abduct left upper arm.  No obvious deformity or suspected fracture.  Tenderness to palpation about the left anterior chest wall.  Some increased pain with AP compression.    No pain in the left hip to internal or external rotation.  No limitation of gait.   Neurological: He is alert and oriented to person, place, and time.   Skin: Skin is warm and dry. No rash noted.   Psychiatric: He has a normal mood and affect. His behavior is normal.         Orders Only on 02/14/2020   Component Date Value Ref Range Status   • Glucose 02/14/2020 133* 65 - 99 mg/dL Final   • BUN 02/14/2020 14  8 - 27 mg/dL Final   • Creatinine 02/14/2020 1.01  0.76 - 1.27 mg/dL Final   • eGFR Non African Am 02/14/2020 78  >59 mL/min/1.73 Final   • eGFR African Am 02/14/2020 90  >59 mL/min/1.73 Final   • BUN/Creatinine Ratio 02/14/2020 14  10 - 24 Final   • Sodium 02/14/2020 143  134 - 144 mmol/L Final   • Potassium 02/14/2020 4.4  3.5 - 5.2 mmol/L Final   • Chloride 02/14/2020 106  96 - 106 mmol/L Final   • Total CO2 02/14/2020 24  20 - 29 mmol/L Final   • Calcium 02/14/2020 9.1  8.6 - 10.2 mg/dL Final         Assessment/Plan   Diagnoses and all orders for this visit:    1. Diabetic peripheral neuropathy associated with type 2 diabetes mellitus (CMS/HCC) (Primary)    2. Edema of lower extremity    3. Venous insufficiency of both lower extremities    4. Acute pain of left shoulder  -     XR Shoulder 2+ View Left (In Office)    5. Left-sided chest wall pain  -     XR Ribs Left With PA Chest    6. Fall, initial  encounter  -     XR Shoulder 2+ View Left (In Office)  -     XR Ribs Left With PA Chest    Continue to follow with endocrinology for management of his diabetes.  Continue bumex for edema  X-ray of the left shoulder, chest, left ribs.  If he still has significant limitation in a few weeks after resting, may need to proceed with an MRI.       Return in about 3 weeks (around 3/9/2020), or if symptoms worsen or fail to improve. to recheck shoulder unless x-ray shows fracture    Call with any problems or concerns before next visit

## 2020-02-18 ENCOUNTER — TELEPHONE (OUTPATIENT)
Dept: FAMILY MEDICINE CLINIC | Facility: CLINIC | Age: 65
End: 2020-02-18

## 2020-02-18 NOTE — TELEPHONE ENCOUNTER
Called patient. Patient's identity verified. Advised him of xr results and to contact office within a week if not better. He voiced understanding.

## 2020-02-18 NOTE — TELEPHONE ENCOUNTER
----- Message from Cuca Aquino MD sent at 2/17/2020  7:02 PM EST -----  Please let patient know that his x-rays did not show any fracture of his shoulder or ribs.  He does have arthritis of the left shoulder that is quite likely to have caused impingement and possible injury to his rotator cuff.  If he does not have better range of motion in his shoulder in 1 week, call and let me know and I will proceed with ordering an MRI to look at his rotator cuff and tendons.  We do not have to wait until his planned follow-up visit to make this decision.  1 week will be long enough.  Thanks

## 2020-02-24 ENCOUNTER — TELEPHONE (OUTPATIENT)
Dept: FAMILY MEDICINE CLINIC | Facility: CLINIC | Age: 65
End: 2020-02-24

## 2020-02-24 DIAGNOSIS — M19.012 GLENOHUMERAL ARTHRITIS, LEFT: Primary | ICD-10-CM

## 2020-03-02 DIAGNOSIS — E11.65 UNCONTROLLED TYPE 2 DIABETES MELLITUS WITH HYPERGLYCEMIA (HCC): Primary | ICD-10-CM

## 2020-03-02 NOTE — TELEPHONE ENCOUNTER
Received fax from [employer] in [place] for clarification on patient's Test Strips. ICD 10 code is needed. Strips resent with ICD 10 code.

## 2020-03-05 ENCOUNTER — TELEPHONE (OUTPATIENT)
Dept: FAMILY MEDICINE CLINIC | Facility: CLINIC | Age: 65
End: 2020-03-05

## 2020-03-08 NOTE — PROGRESS NOTES
Subjective   Haider Holland is a 64 y.o. male.   Chief Complaint   Patient presents with   • Follow-up     (L) shoulder pain- unchanged- Pain aggrevated with certain positions/movements; Has not had MRI yet.        History of Present Illness   Presents today to follow-up on left shoulder pain.  At the last visit we did x-rays.  Left shoulder x-ray showed mild to moderate before meals and glenohumeral osteoarthritis.  There was a lot of subacromial spurring, as well.  Left rib series and x-ray were normal.  As his x-rays suggested a meal use it could promote rotator cuff tear or injury, MRI of his left shoulder was ordered.  It has not been done yet.  Still with pain in left shoulder if sleeps on right side.  Pain with sudden movements, especially when reaching up.   Had a fall 4-5 months ago - some injury to shoulder then.     His diabetes is followed by Dr. Allison.      Patient Active Problem List    Diagnosis Date Noted   • Venous insufficiency of both lower extremities 02/17/2020   • Mild nonproliferative diabetic retinopathy without macular edema associated with type 2 diabetes mellitus (CMS/[facility]) 01/20/2020     Note Last Updated: 1/20/2020     Dr. Mcleod  [facility]. 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     • Body mass index (BMI) of 40.0-44.9 in adult (CMS/[facility]) 02/05/2019   • Edema of lower extremity 02/05/2019   • Neuropathy 08/07/2018   • Coronary artery disease 10/24/2017     Note Last Updated: 12/13/2019     MI in 2008 and 2012.  Had cath with stent - 2017     • Uncontrolled type 2 diabetes mellitus with hyperglycemia (CMS/[facility]) 10/24/2017     Note Last Updated: 1/11/2020     Managed by  endocrinology     • Abnormal liver function tests 09/27/2017   • Diabetic peripheral neuropathy associated with type 2 diabetes mellitus (CMS/HCC) 09/27/2017   • Mixed hyperlipidemia 09/27/2017   • Hypertension, benign 09/27/2017           Past Surgical History:   Procedure Laterality Date   • CARDIAC CATHETERIZATION  11/27/2017, 2008    with stent in 2017   • CHOLECYSTECTOMY  12/11/2017   • EYE SURGERY  2019   • OTHER SURGICAL HISTORY      rt arm sx     Current Outpatient Medications on File Prior to Visit   Medication Sig   • aspirin (ASPIR) 81 MG EC tablet Take 1 tablet by mouth 2 (Two) Times a Day. aspir-81  81MG oral tablet delayed release   • bumetanide (BUMEX) 1 MG tablet Take 1 tablet by mouth Daily.   • Canagliflozin (INVOKANA) 100 MG tablet Take 1 tablet by mouth Daily.   • dicyclomine (BENTYL) 10 MG capsule Take 1 capsule by mouth 4 (Four) Times a Day As Needed (1-4 times a day as needed).   • gabapentin (NEURONTIN) 600 MG tablet Take 1 tablet by mouth 4 (Four) Times a Day.   • glimepiride (AMARYL) 2 MG tablet Take 1 tablet by mouth 2 (Two) Times a Day.   • glucose blood test strip Check BS twice a day [id number]   • HUMALOG MIX 75/25 KWIKPEN (75-25) 100 UNIT/ML suspension pen-injector pen INJECT 25 UNITS SUBCUTANEOUSLY INTO THE APPROPRIATE AREA AS DIRECTED TWICE DAILY WITH MEALS   • Insulin Pen Needle 32G X 4 MM misc Use as directed with Insluin   • lisinopril (PRINIVIL,ZESTRIL) 10 MG tablet Take 1 tablet by mouth Daily.   • SITagliptin-metFORMIN HCl ER (JANUMET XR)  MG tablet Take 1 tablet by mouth 2 (Two) Times a Day.     No current facility-administered medications on file prior to visit.      No Known Allergies  Social History     Socioeconomic History   • Marital status:      Spouse name: Not on file   • Number of children: Not on file   • Years of education: Not on file   • Highest education level: Not on file   Tobacco Use   • Smoking status: Never Smoker   • Smokeless tobacco: Never  "Used   Substance and Sexual Activity   • Alcohol use: No     Frequency: Never   • Drug use: No   • Sexual activity: Defer     Family History   Problem Relation Age of Onset   • Diabetes Mother    • Heart disease Mother    • Hypertension Mother    • Heart disease Father    • Diabetes Brother      The following portions of the patient's history were reviewed and updated as appropriate: allergies, current medications, past family history, past medical history, past social history, past surgical history and problem list.    Review of Systems   Constitutional: Negative for chills and fever.   Respiratory: Negative for shortness of breath.    Cardiovascular: Negative for chest pain.   Gastrointestinal: Negative for abdominal pain.       Objective   /65 (BP Location: Right arm, Patient Position: Sitting, Cuff Size: Large Adult)   Pulse 74   Temp 97.8 °F (36.6 °C) (Oral)   Resp 20   Ht 180.3 cm (71\")   Wt (!) 138 kg (305 lb)   SpO2 96%   BMI 42.54 kg/m²   Physical Exam   Constitutional: He is oriented to person, place, and time. He appears well-developed and well-nourished.   HENT:   Head: Normocephalic and atraumatic.   Eyes: Conjunctivae and EOM are normal.   Neck: Normal range of motion.   Cardiovascular: Normal rate.   Pulmonary/Chest: Effort normal.   Musculoskeletal: Normal range of motion.   + tenderness to anterior left shoulder.  Positive impingement sign on the left.  No obvious effusion of the shoulder, although there may be a little swelling.  Negative sulcus sign.  Positive winging of the left shoulder with abduction of the left arm.  Distal neuro vasculature is intact.  Biceps strength seems good when left shoulder is against the body.   Neurological: He is alert and oriented to person, place, and time.   Skin: Skin is warm and dry. No rash noted.   Psychiatric: He has a normal mood and affect. His behavior is normal.         Orders Only on 02/14/2020   Component Date Value Ref Range Status   • " Glucose 02/14/2020 133* 65 - 99 mg/dL Final   • BUN 02/14/2020 14  8 - 27 mg/dL Final   • Creatinine 02/14/2020 1.01  0.76 - 1.27 mg/dL Final   • eGFR Non African Am 02/14/2020 78  >59 mL/min/1.73 Final   • eGFR African Am 02/14/2020 90  >59 mL/min/1.73 Final   • BUN/Creatinine Ratio 02/14/2020 14  10 - 24 Final   • Sodium 02/14/2020 143  134 - 144 mmol/L Final   • Potassium 02/14/2020 4.4  3.5 - 5.2 mmol/L Final   • Chloride 02/14/2020 106  96 - 106 mmol/L Final   • Total CO2 02/14/2020 24  20 - 29 mmol/L Final   • Calcium 02/14/2020 9.1  8.6 - 10.2 mg/dL Final         Assessment/Plan   Diagnoses and all orders for this visit:    1. Acute pain of left shoulder (Primary)  -     Ambulatory Referral to Physical Therapy Evaluate and treat    2. Glenohumeral arthritis, left  -     Ambulatory Referral to Physical Therapy Evaluate and treat    Will do course of PT.  If no improvement, will pursue MRI further.  Based on his x-ray findings and clinical exam, strongly suspect rotator cuff damage, if not tear.  Hopefully therapy will improve symptoms, but we will see.  Continue heat to the shoulder,  Gentle stretching and judicious Tylenol.           Return in about 4 weeks (around 4/6/2020).  To recheck shoulder pain.    Call with any problems or concerns before next visit

## 2020-03-09 ENCOUNTER — OFFICE VISIT (OUTPATIENT)
Dept: FAMILY MEDICINE CLINIC | Facility: CLINIC | Age: 65
End: 2020-03-09

## 2020-03-09 VITALS
TEMPERATURE: 97.8 F | HEART RATE: 74 BPM | DIASTOLIC BLOOD PRESSURE: 65 MMHG | BODY MASS INDEX: 42.7 KG/M2 | SYSTOLIC BLOOD PRESSURE: 133 MMHG | HEIGHT: 71 IN | WEIGHT: 305 LBS | OXYGEN SATURATION: 96 % | RESPIRATION RATE: 20 BRPM

## 2020-03-09 DIAGNOSIS — M25.512 ACUTE PAIN OF LEFT SHOULDER: Primary | ICD-10-CM

## 2020-03-09 DIAGNOSIS — M19.012 GLENOHUMERAL ARTHRITIS, LEFT: ICD-10-CM

## 2020-03-09 PROCEDURE — 99213 OFFICE O/P EST LOW 20 MIN: CPT | Performed by: FAMILY MEDICINE

## 2020-03-16 ENCOUNTER — TELEPHONE (OUTPATIENT)
Dept: FAMILY MEDICINE CLINIC | Facility: CLINIC | Age: 65
End: 2020-03-16

## 2020-04-10 RX ORDER — DICYCLOMINE HYDROCHLORIDE 10 MG/1
CAPSULE ORAL
Qty: 360 CAPSULE | Refills: 3 | Status: SHIPPED | OUTPATIENT
Start: 2020-04-10 | End: 2020-11-24

## 2020-04-10 RX ORDER — PEN NEEDLE, DIABETIC 32GX 5/32"
NEEDLE, DISPOSABLE MISCELLANEOUS
Qty: 300 EACH | Refills: 3 | Status: SHIPPED | OUTPATIENT
Start: 2020-04-10 | End: 2021-04-23

## 2020-04-10 RX ORDER — GLIMEPIRIDE 2 MG/1
TABLET ORAL
Qty: 180 TABLET | Refills: 3 | Status: SHIPPED | OUTPATIENT
Start: 2020-04-10 | End: 2021-04-01

## 2020-04-10 RX ORDER — CANAGLIFLOZIN 100 MG/1
TABLET, FILM COATED ORAL
Qty: 90 TABLET | Refills: 3 | Status: SHIPPED | OUTPATIENT
Start: 2020-04-10 | End: 2021-04-14 | Stop reason: SDUPTHER

## 2020-05-15 RX ORDER — LISINOPRIL 10 MG/1
TABLET ORAL
Qty: 30 TABLET | Refills: 5 | Status: SHIPPED | OUTPATIENT
Start: 2020-05-15 | End: 2020-11-18

## 2020-05-21 DIAGNOSIS — E11.65 UNCONTROLLED TYPE 2 DIABETES MELLITUS WITH HYPERGLYCEMIA (HCC): ICD-10-CM

## 2020-05-21 DIAGNOSIS — E11.42 DIABETIC PERIPHERAL NEUROPATHY ASSOCIATED WITH TYPE 2 DIABETES MELLITUS (HCC): ICD-10-CM

## 2020-05-21 DIAGNOSIS — I10 HYPERTENSION, BENIGN: ICD-10-CM

## 2020-05-21 DIAGNOSIS — E78.2 MIXED HYPERLIPIDEMIA: ICD-10-CM

## 2020-05-22 ENCOUNTER — OFFICE VISIT (OUTPATIENT)
Dept: FAMILY MEDICINE CLINIC | Facility: CLINIC | Age: 65
End: 2020-05-22

## 2020-05-22 VITALS
WEIGHT: 309 LBS | OXYGEN SATURATION: 93 % | HEIGHT: 71 IN | TEMPERATURE: 97.6 F | DIASTOLIC BLOOD PRESSURE: 67 MMHG | HEART RATE: 108 BPM | SYSTOLIC BLOOD PRESSURE: 181 MMHG | BODY MASS INDEX: 43.26 KG/M2

## 2020-05-22 DIAGNOSIS — L03.116 CELLULITIS OF LEFT LEG: ICD-10-CM

## 2020-05-22 DIAGNOSIS — I10 HYPERTENSION, BENIGN: ICD-10-CM

## 2020-05-22 DIAGNOSIS — L30.1 DYSHYDROSIS: ICD-10-CM

## 2020-05-22 DIAGNOSIS — I73.9 PAD (PERIPHERAL ARTERY DISEASE) (HCC): Chronic | ICD-10-CM

## 2020-05-22 DIAGNOSIS — I25.10 CORONARY ARTERY DISEASE INVOLVING NATIVE CORONARY ARTERY OF NATIVE HEART, ANGINA PRESENCE UNSPECIFIED: ICD-10-CM

## 2020-05-22 DIAGNOSIS — R60.0 EDEMA OF LOWER EXTREMITY: Primary | ICD-10-CM

## 2020-05-22 PROCEDURE — 99213 OFFICE O/P EST LOW 20 MIN: CPT | Performed by: FAMILY MEDICINE

## 2020-05-22 RX ORDER — CEPHALEXIN 500 MG/1
500 CAPSULE ORAL 2 TIMES DAILY
Qty: 14 CAPSULE | Refills: 0 | Status: SHIPPED | OUTPATIENT
Start: 2020-05-22 | End: 2020-05-28

## 2020-05-22 RX ORDER — AMMONIUM LACTATE 12 G/100G
CREAM TOPICAL DAILY
Qty: 385 G | Refills: 2 | Status: SHIPPED | OUTPATIENT
Start: 2020-05-22

## 2020-05-22 NOTE — PROGRESS NOTES
Subjective   Haider Holland is a 65 y.o. male.   Chief Complaint   Patient presents with   • Leg Swelling       History of Present Illness     Comes in today because of drainage that is developed on his left leg.  He has had chronically swollen bilateral legs due to venous insufficiency.  For about the past month he has had cracked, eroded skin on the front of his left lower leg.  It is red and will ooze clear fluid from time to time.  He put some triple antibiotic ointment on it and it burned.  He has a diagnosis of peripheral arterial disease, but he had normal Dopplers in September 2019.    He has diabetes.  Had labs drawn for his endocrinologist this morning.  Sees him next week.  The labs from this morning are not back.  He had a creatinine of 1.03 months ago.    Has high blood pressure-typically around 130 systolic is normal for him.  That has been the average at his past few office visits.    Coronary artery disease-had a stent in 2017.  No chest pains or shortness of breath.        Patient Active Problem List    Diagnosis Date Noted   • Venous insufficiency of both lower extremities 02/17/2020   • Mild nonproliferative diabetic retinopathy without macular edema associated with type 2 diabetes mellitus (CMS/[facility]) 01/20/2020     Note Last Updated: 1/20/2020     Dr. Mcleod  Local opto - Dr. 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     • Body mass index (BMI) of 40.0-44.9 in adult (CMS/[facility]) 02/05/2019   • Edema of lower extremity 02/05/2019   • Neuropathy 08/07/2018   • Coronary artery disease 10/24/2017     Note Last Updated: 12/13/2019     MI in 2008 and 2012.  Had cath with stent - 2017     •  Uncontrolled type 2 diabetes mellitus with hyperglycemia (CMS/[facility]) 10/24/2017     Note Last Updated: 1/11/2020     Managed by endocrinology     • Abnormal liver function tests 09/27/2017   • Diabetic peripheral neuropathy associated with type 2 diabetes mellitus (CMS/[facility]) 09/27/2017   • Mixed hyperlipidemia 09/27/2017   • Hypertension, benign 09/27/2017           Past Surgical History:   Procedure Laterality Date   • CARDIAC CATHETERIZATION  11/27/2017, 2008    with stent in 2017   • CHOLECYSTECTOMY  12/11/2017   • EYE SURGERY  2019   • OTHER SURGICAL HISTORY      rt arm sx     Current Outpatient Medications on File Prior to Visit   Medication Sig   • aspirin (ASPIR) 81 MG EC tablet Take 1 tablet by mouth 2 (Two) Times a Day. aspir-81  81MG oral tablet delayed release   • bumetanide (BUMEX) 1 MG tablet Take 1 tablet by mouth Daily.   • dicyclomine (BENTYL) 10 MG capsule TAKE 1 CAPSULE BY MOUTH 1-4 TIMES DAILY AS NEEDED   • gabapentin (NEURONTIN) 600 MG tablet Take 1 tablet by mouth 4 (Four) Times a Day.   • glimepiride (AMARYL) 2 MG tablet TAKE ONE (1) TABLET BY MOUTH TWICE DAILY   • glucose blood test strip Check BS twice a day DX:E11.65   • HUMALOG MIX 75/25 KWIKPEN (75-25) 100 UNIT/ML suspension pen-injector pen INJECT 25 UNITS SUBCUTANEOUSLY INTO THE APPROPRIATE AREA AS DIRECTED TWICE DAILY WITH MEALS   • INVOKANA 100 MG tablet TAKE (1) TABLET BY MOUTH DAILY   • lisinopril (PRINIVIL,ZESTRIL) 10 MG tablet TAKE (1) TABLET BY MOUTH DAILY   • SITagliptin-metFORMIN HCl ER (JANUMET XR)  MG tablet Take 1 tablet by mouth 2 (Two) Times a Day.   • TRUEPLUS PEN NEEDLES 32G X 4 MM misc USE AS DIRECTED WITH INSULIN     No current facility-administered medications on file prior to visit.      No Known Allergies  Social History     Socioeconomic History   • Marital status:      Spouse name: Not on file   • Number of children: Not on file   • Years of education: Not on file   • Highest education level: Not on file  "  Tobacco Use   • Smoking status: Never Smoker   • Smokeless tobacco: Never Used   Substance and Sexual Activity   • Alcohol use: No     Frequency: Never   • Drug use: No   • Sexual activity: Defer     Family History   Problem Relation Age of Onset   • Diabetes Mother    • Heart disease Mother    • Hypertension Mother    • Heart disease Father    • Diabetes Brother      The following portions of the patient's history were reviewed and updated as appropriate: allergies, current medications, past family history, past medical history, past social history, past surgical history and problem list.    Review of Systems   Constitutional: Negative for chills and fever.   Respiratory: Negative for cough and shortness of breath.    Cardiovascular: Negative for chest pain and palpitations.   Gastrointestinal: Negative for abdominal pain.   Neurological: Negative for headache.       Objective   BP (!) 181/67 (BP Location: Right arm, Patient Position: Sitting, Cuff Size: Adult)   Pulse 108   Temp 97.6 °F (36.4 °C) (Temporal)   Ht 180.3 cm (71\")   Wt (!) 140 kg (309 lb)   SpO2 93%   BMI 43.10 kg/m²   Physical Exam   Constitutional: He is oriented to person, place, and time. He appears well-developed and well-nourished.   Wears a medical procedure mask throughout the visit He is obese.  HENT:   Head: Normocephalic and atraumatic.   Eyes: Conjunctivae and EOM are normal.   Neck: Normal range of motion.   Cardiovascular: Normal rate.   Pulmonary/Chest: Effort normal.   Musculoskeletal: Normal range of motion. He exhibits edema.   Anterior left lower leg- there is an area probably 10 x 8 cm with cracked, lightly fissured skin.  The entire area is red.  There are a few spots of yellowish crusted dried drainage.  Distal to all of this, his skin is extraordinarily dried, almost lichenified.    Extremities are warm to the touch.  Homans sign is negative.  He hardly has any dorsiflexion of his ankles.  The edema is unchanged.  I " would only grade the pitting as possibly 1+.   Neurological: He is alert and oriented to person, place, and time.   Skin: Skin is warm and dry. No rash noted.   Psychiatric: He has a normal mood and affect. His behavior is normal.         Orders Only on 02/14/2020   Component Date Value Ref Range Status   • Glucose 02/14/2020 133* 65 - 99 mg/dL Final   • BUN 02/14/2020 14  8 - 27 mg/dL Final   • Creatinine 02/14/2020 1.01  0.76 - 1.27 mg/dL Final   • eGFR Non African Am 02/14/2020 78  >59 mL/min/1.73 Final   • eGFR African Am 02/14/2020 90  >59 mL/min/1.73 Final   • BUN/Creatinine Ratio 02/14/2020 14  10 - 24 Final   • Sodium 02/14/2020 143  134 - 144 mmol/L Final   • Potassium 02/14/2020 4.4  3.5 - 5.2 mmol/L Final   • Chloride 02/14/2020 106  96 - 106 mmol/L Final   • Total CO2 02/14/2020 24  20 - 29 mmol/L Final   • Calcium 02/14/2020 9.1  8.6 - 10.2 mg/dL Final         Assessment/Plan   Diagnoses and all orders for this visit:    1. Edema of lower extremity (Primary)    2. Cellulitis of left leg  -     mupirocin (Bactroban) 2 % ointment; Apply  topically to the appropriate area as directed 2 (Two) Times a Day.  Dispense: 30 g; Refill: 2  -     cephalexin (Keflex) 500 MG capsule; Take 1 capsule by mouth 2 (Two) Times a Day for 7 days.  Dispense: 14 capsule; Refill: 0    3. Hypertension, benign    4. Dyshydrosis    5. Coronary artery disease involving native coronary artery of native heart, angina presence unspecified    Other orders  -     ammonium lactate (Lac-Hydrin) 12 % cream; Apply  topically to the appropriate area as directed Daily. Both lower legs  Dispense: 385 g; Refill: 2    Continue all of his other current treatments and follow-up with specialists.  Apply Bactroban twice daily to the red eroded area on the front of his leg.  Cover with a Telfa and then wrap with an Ace wrap.  If is not doing better in a week, let me know.  We will also cover him with 1 week of Keflex.  We will add Lac-Hydrin to help  the more severe dyshidrosis distally.  Continue to monitor blood pressure at home.  If he sees numbers continually above 140 systolic let me know.         Return in about 6 months (around 11/22/2020), or if symptoms worsen or fail to improve, for or for any acute illness or need.    Call with any problems or concerns before next visit

## 2020-05-23 LAB
ALBUMIN SERPL-MCNC: 4 G/DL (ref 3.8–4.8)
ALBUMIN/CREAT UR: 13 MG/G CREAT (ref 0–29)
ALBUMIN/GLOB SERPL: 1.5 {RATIO} (ref 1.2–2.2)
ALP SERPL-CCNC: 81 IU/L (ref 39–117)
ALT SERPL-CCNC: 34 IU/L (ref 0–44)
AST SERPL-CCNC: 36 IU/L (ref 0–40)
BILIRUB SERPL-MCNC: 0.8 MG/DL (ref 0–1.2)
BUN SERPL-MCNC: 13 MG/DL (ref 8–27)
BUN/CREAT SERPL: 13 (ref 10–24)
CALCIUM SERPL-MCNC: 9.1 MG/DL (ref 8.6–10.2)
CHLORIDE SERPL-SCNC: 103 MMOL/L (ref 96–106)
CHOLEST SERPL-MCNC: 120 MG/DL (ref 100–199)
CO2 SERPL-SCNC: 23 MMOL/L (ref 20–29)
CREAT SERPL-MCNC: 1 MG/DL (ref 0.76–1.27)
CREAT UR-MCNC: 24.2 MG/DL
GLOBULIN SER CALC-MCNC: 2.6 G/DL (ref 1.5–4.5)
GLUCOSE SERPL-MCNC: 235 MG/DL (ref 65–99)
HBA1C MFR BLD: 7.3 % (ref 4.8–5.6)
HDLC SERPL-MCNC: 30 MG/DL
LDLC SERPL CALC-MCNC: 48 MG/DL (ref 0–99)
MICROALBUMIN UR-MCNC: 3.1 UG/ML
POTASSIUM SERPL-SCNC: 4.2 MMOL/L (ref 3.5–5.2)
PROT SERPL-MCNC: 6.6 G/DL (ref 6–8.5)
SODIUM SERPL-SCNC: 140 MMOL/L (ref 134–144)
TRIGL SERPL-MCNC: 211 MG/DL (ref 0–149)
VLDLC SERPL CALC-MCNC: 42 MG/DL (ref 5–40)

## 2020-05-28 ENCOUNTER — OFFICE VISIT (OUTPATIENT)
Dept: ENDOCRINOLOGY | Facility: CLINIC | Age: 65
End: 2020-05-28

## 2020-05-28 VITALS
OXYGEN SATURATION: 98 % | SYSTOLIC BLOOD PRESSURE: 135 MMHG | HEIGHT: 71 IN | BODY MASS INDEX: 42.28 KG/M2 | DIASTOLIC BLOOD PRESSURE: 75 MMHG | TEMPERATURE: 98.1 F | HEART RATE: 103 BPM | WEIGHT: 302 LBS

## 2020-05-28 DIAGNOSIS — I10 HYPERTENSION, BENIGN: ICD-10-CM

## 2020-05-28 DIAGNOSIS — E78.2 MIXED HYPERLIPIDEMIA: ICD-10-CM

## 2020-05-28 DIAGNOSIS — E11.65 UNCONTROLLED TYPE 2 DIABETES MELLITUS WITH HYPERGLYCEMIA (HCC): Primary | ICD-10-CM

## 2020-05-28 LAB — GLUCOSE BLDC GLUCOMTR-MCNC: 161 MG/DL (ref 70–130)

## 2020-05-28 PROCEDURE — 82962 GLUCOSE BLOOD TEST: CPT | Performed by: INTERNAL MEDICINE

## 2020-05-28 PROCEDURE — 99214 OFFICE O/P EST MOD 30 MIN: CPT | Performed by: INTERNAL MEDICINE

## 2020-05-28 RX ORDER — ATORVASTATIN CALCIUM 10 MG/1
10 TABLET, FILM COATED ORAL DAILY
Qty: 30 TABLET | Refills: 11 | Status: SHIPPED | OUTPATIENT
Start: 2020-05-28 | End: 2021-04-14 | Stop reason: SDUPTHER

## 2020-05-28 NOTE — PROGRESS NOTES
Endocrine Progress Note Outpatient     Patient Care Team:  Cuca Aquino MD as PCP - General (Family Medicine)  Diaz Orellana MD as PCP - Claims Attributed    Chief Complaint: Follow up type 2 diabetes    HPI: 65-year-old male with history of type 2 diabetes, hypertension, hyperlipidemia and obesity is here for follow-up.  For type 2 diabetes he is currently on Janumet XR 50/thousand, 2 tablets p.o. daily, glimepiride 2 mg twice a day, Invokana 100 medium daily and Humalog mix 75/2520 units twice a day.  He did bring in blood sugar records for review and they are running mostly between 100-160.  He does not have low blood sugars.  Is trying his best for his diet.  He has not gotten flu or pneumonia vaccine.    Hypertension: Well-controlled  Hyperlipidemia: Is currently not taking any cholesterol-lowering medications.  Obesity: He is advised to continue to work on his diet and activity.    Past Medical History:   Diagnosis Date   • Abnormal liver function tests 09/27/2017   • Bilateral lower extremity edema 02/05/2019   • BMI 40.0-44.9, adult (CMS/[facility]) 02/05/2019   • CAD (coronary artery disease) 10/24/2017   • Cellulitis of leg, right 04/02/2019   • Chest pain 10/24/2017   • Cough 02/06/2018   • Diabetes mellitus, type II (CMS/[facility]) 10/24/2017   • Hyperlipidemia 09/27/2017   • Hypertension, benign 09/27/2017   • MI (myocardial infarction) (CMS/[facility]) 2008   • Neuropathy 08/07/2018   • Peripheral neuropathy    • Screening for colon cancer 02/05/2019   • Screening PSA (prostate specific antigen) 02/05/2019   • Skin abscess 09/18/2018   • Sore throat 02/06/2018   • Special screening for malignant neoplasm of prostate 11/30/2017   • Type 2 diabetes, uncontrolled, with neuropathy (CMS/[facility]) 09/27/2017    DM typeII with peripheral neuropathy uncontrolled   • URI, acute 02/06/2018   • Well adult exam 11/30/2017       Social History     Socioeconomic History   • Marital status:      Spouse name: Not on file    • Number of children: Not on file   • Years of education: Not on file   • Highest education level: Not on file   Tobacco Use   • Smoking status: Never Smoker   • Smokeless tobacco: Never Used   Substance and Sexual Activity   • Alcohol use: No     Frequency: Never   • Drug use: No   • Sexual activity: Defer       Family History   Problem Relation Age of Onset   • Diabetes Mother    • Heart disease Mother    • Hypertension Mother    • Heart disease Father    • Diabetes Brother        No Known Allergies    ROS:   Constitutional:  Denies fatigue, tiredness.    Eyes:  Denies change in visual acuity   HENT:  Denies nasal congestion or sore throat   Respiratory: denies cough, shortness of breath.   Cardiovascular:  denies chest pain, edema   GI:  Denies abdominal pain, nausea, vomiting.   Musculoskeletal:  Denies back pain or joint pain   Integument:  Denies dry skin and rash   Neurologic:  Denies headache, focal weakness or sensory changes   Endocrine:  Denies polyuria or polydipsia   Psychiatric:  Denies depression or anxiety      Vitals:    05/28/20 1448   BP: 135/75   Pulse: 103   Temp: 98.1 °F (36.7 °C)   SpO2: 98%       Physical Exam:  GEN: NAD, conversant, obese  EYES: EOMI, PERRL, no conjunctival erythema  NECK: no thyromegaly, full ROM   CV: RRR, no murmurs/rubs/gallops, no peripheral edema  LUNG: CTAB, no wheezes/rales/ronchi  SKIN: no rashes, no acanthosis  MSK: no deformities, full ROM of all extremities  NEURO: no tremors, DTR normal  PSYCH: AOX3, appropriate mood, affect normal  Feet: Has calluses on both feet, no ulcer      Results Review:     I reviewed the patient's new clinical results.    Lab Results   Component Value Date    HGBA1C 7.3 (H) 05/22/2020    HGBA1C 6.1 (H) 09/03/2019      Lab Results   Component Value Date    GLUCOSE 99 12/01/2017    BUN 13 05/22/2020    CREATININE 1.00 05/22/2020    EGFRIFNONA 79 05/22/2020    EGFRIFAFRI 91 05/22/2020    BCR 13 05/22/2020    K 4.2 05/22/2020    CO2 23  05/22/2020    CALCIUM 9.1 05/22/2020    PROTENTOTREF 6.6 05/22/2020    ALBUMIN 4.0 05/22/2020    LABIL2 1.5 05/22/2020    AST 36 05/22/2020    ALT 34 05/22/2020    CHOL 87 (L) 12/01/2017    TRIG 211 (H) 05/22/2020    LDL 48 05/22/2020    HDL 30 (L) 05/22/2020     Lab Results   Component Value Date    TSH 3.51 12/01/2017         Medication Review: Reviewed.       Current Outpatient Medications:   •  ammonium lactate (Lac-Hydrin) 12 % cream, Apply  topically to the appropriate area as directed Daily. Both lower legs, Disp: 385 g, Rfl: 2  •  aspirin (ASPIR) 81 MG EC tablet, Take 1 tablet by mouth 2 (Two) Times a Day. aspir-81  81MG oral tablet delayed release, Disp: 180 tablet, Rfl: 1  •  bumetanide (BUMEX) 1 MG tablet, Take 1 tablet by mouth Daily., Disp: 90 tablet, Rfl: 1  •  dicyclomine (BENTYL) 10 MG capsule, TAKE 1 CAPSULE BY MOUTH 1-4 TIMES DAILY AS NEEDED, Disp: 360 capsule, Rfl: 3  •  gabapentin (NEURONTIN) 600 MG tablet, Take 1 tablet by mouth 4 (Four) Times a Day., Disp: 120 tablet, Rfl: 1  •  glimepiride (AMARYL) 2 MG tablet, TAKE ONE (1) TABLET BY MOUTH TWICE DAILY, Disp: 180 tablet, Rfl: 3  •  glucose blood test strip, Check BS twice a day DX:E11.65, Disp: 300 each, Rfl: 1  •  HUMALOG MIX 75/25 KWIKPEN (75-25) 100 UNIT/ML suspension pen-injector pen, INJECT 25 UNITS SUBCUTANEOUSLY INTO THE APPROPRIATE AREA AS DIRECTED TWICE DAILY WITH MEALS, Disp: 15 mL, Rfl: 10  •  INVOKANA 100 MG tablet, TAKE (1) TABLET BY MOUTH DAILY, Disp: 90 tablet, Rfl: 3  •  lisinopril (PRINIVIL,ZESTRIL) 10 MG tablet, TAKE (1) TABLET BY MOUTH DAILY, Disp: 30 tablet, Rfl: 5  •  mupirocin (Bactroban) 2 % ointment, Apply  topically to the appropriate area as directed 2 (Two) Times a Day., Disp: 30 g, Rfl: 2  •  SITagliptin-metFORMIN HCl ER (JANUMET XR)  MG tablet, Take 1 tablet by mouth 2 (Two) Times a Day., Disp: , Rfl:   •  TRUEPLUS PEN NEEDLES 32G X 4 MM misc, USE AS DIRECTED WITH INSULIN, Disp: 300 each, Rfl:  3      Assessment/Plan   1.  Diabetes mellitus type 2: Excellent control with A1c of 7.3%.  I have reviewed his blood sugar records.  At this time I will continue his current regimen and follow blood sugars and A1c.  I advised him to get annual eye exam and flu vaccine however he does not want to get a flu shot but he is interested in getting pneumonia vaccine today.  Advised to always keep glucose source with him in case of low blood sugar.  2.  Hypertension: Well-controlled, continue current medication  3.  Hyperlipidemia: LDL is 48 and triglycerides high at 211, I think he would benefit from statins, I will add atorvastatin 10 mg p.o. daily.  4.  Obesity: Advised to continue work on diet and activity and try to lose some weight.            Tono Allison MD FACE.    Much of the above report is an electronic transcription/translation of the spoken language to printed text using Dragon Software. As such, the subtleties and finesse of the spoken language may permit erroneous, or at times, nonsensical words or phrases to be inadvertently transcribed; thus changes may be made at a later date to rectify these errors.

## 2020-05-28 NOTE — PATIENT INSTRUCTIONS
Start atorvastatin 10 mg p.o. daily  Continue rest of the medication  Always get regular eye exam and flu vaccine  Always keep glucose source in case of low blood sugar  Follow-up in 6 months with labs.

## 2020-07-06 DIAGNOSIS — R60.0 EDEMA OF LOWER EXTREMITY: ICD-10-CM

## 2020-07-06 RX ORDER — BUMETANIDE 1 MG/1
1 TABLET ORAL DAILY
Qty: 90 TABLET | Refills: 3 | Status: SHIPPED | OUTPATIENT
Start: 2020-07-06 | End: 2021-06-30 | Stop reason: SDUPTHER

## 2020-08-10 DIAGNOSIS — E11.42 DIABETIC PERIPHERAL NEUROPATHY ASSOCIATED WITH TYPE 2 DIABETES MELLITUS (HCC): ICD-10-CM

## 2020-08-10 RX ORDER — GABAPENTIN 600 MG/1
600 TABLET ORAL 4 TIMES DAILY
Qty: 120 TABLET | Refills: 3 | Status: SHIPPED | OUTPATIENT
Start: 2020-08-10 | End: 2021-03-30

## 2020-11-13 DIAGNOSIS — E11.65 UNCONTROLLED TYPE 2 DIABETES MELLITUS WITH HYPERGLYCEMIA (HCC): ICD-10-CM

## 2020-11-15 LAB
ALBUMIN SERPL-MCNC: 3.9 G/DL (ref 3.8–4.8)
ALBUMIN/CREAT UR: <13 MG/G CREAT (ref 0–29)
ALBUMIN/GLOB SERPL: 1.3 {RATIO} (ref 1.2–2.2)
ALP SERPL-CCNC: 86 IU/L (ref 39–117)
ALT SERPL-CCNC: 32 IU/L (ref 0–44)
AST SERPL-CCNC: 36 IU/L (ref 0–40)
BILIRUB SERPL-MCNC: 0.7 MG/DL (ref 0–1.2)
BUN SERPL-MCNC: 13 MG/DL (ref 8–27)
BUN/CREAT SERPL: 13 (ref 10–24)
CALCIUM SERPL-MCNC: 8.9 MG/DL (ref 8.6–10.2)
CHLORIDE SERPL-SCNC: 106 MMOL/L (ref 96–106)
CHOLEST SERPL-MCNC: 113 MG/DL (ref 100–199)
CO2 SERPL-SCNC: 26 MMOL/L (ref 20–29)
CREAT SERPL-MCNC: 1.04 MG/DL (ref 0.76–1.27)
CREAT UR-MCNC: 23.8 MG/DL
EST. AVERAGE GLUCOSE BLD GHB EST-MCNC: 140 MG/DL
GLOBULIN SER CALC-MCNC: 2.9 G/DL (ref 1.5–4.5)
GLUCOSE SERPL-MCNC: 112 MG/DL (ref 65–99)
HBA1C MFR BLD: 6.5 %HB
HDLC SERPL-MCNC: 30 MG/DL
LDLC SERPL CALC-MCNC: 43 MG/DL (ref 0–99)
MICROALBUMIN UR-MCNC: <3 UG/ML
POTASSIUM SERPL-SCNC: 3.8 MMOL/L (ref 3.5–5.2)
PROT SERPL-MCNC: 6.8 G/DL (ref 6–8.5)
SODIUM SERPL-SCNC: 142 MMOL/L (ref 134–144)
TRIGL SERPL-MCNC: 258 MG/DL (ref 0–149)
VLDLC SERPL CALC-MCNC: 40 MG/DL (ref 5–40)

## 2020-11-18 RX ORDER — INSULIN LISPRO 100 [IU]/ML
INJECTION, SUSPENSION SUBCUTANEOUS
Qty: 15 ML | Refills: 7 | Status: SHIPPED | OUTPATIENT
Start: 2020-11-18 | End: 2021-09-07

## 2020-11-18 RX ORDER — LISINOPRIL 10 MG/1
TABLET ORAL
Qty: 30 TABLET | Refills: 5 | Status: SHIPPED | OUTPATIENT
Start: 2020-11-18 | End: 2020-11-19 | Stop reason: SDUPTHER

## 2020-11-19 ENCOUNTER — OFFICE VISIT (OUTPATIENT)
Dept: ENDOCRINOLOGY | Facility: CLINIC | Age: 65
End: 2020-11-19

## 2020-11-19 VITALS
SYSTOLIC BLOOD PRESSURE: 152 MMHG | HEART RATE: 81 BPM | BODY MASS INDEX: 41.58 KG/M2 | DIASTOLIC BLOOD PRESSURE: 75 MMHG | HEIGHT: 71 IN | TEMPERATURE: 98 F | OXYGEN SATURATION: 98 % | WEIGHT: 297 LBS

## 2020-11-19 DIAGNOSIS — I10 HYPERTENSION, BENIGN: ICD-10-CM

## 2020-11-19 DIAGNOSIS — E11.65 UNCONTROLLED TYPE 2 DIABETES MELLITUS WITH HYPERGLYCEMIA (HCC): Primary | ICD-10-CM

## 2020-11-19 DIAGNOSIS — E78.2 MIXED HYPERLIPIDEMIA: ICD-10-CM

## 2020-11-19 LAB — GLUCOSE BLDC GLUCOMTR-MCNC: 134 MG/DL (ref 70–105)

## 2020-11-19 PROCEDURE — 99214 OFFICE O/P EST MOD 30 MIN: CPT | Performed by: INTERNAL MEDICINE

## 2020-11-19 PROCEDURE — 82962 GLUCOSE BLOOD TEST: CPT | Performed by: INTERNAL MEDICINE

## 2020-11-19 RX ORDER — LISINOPRIL 20 MG/1
20 TABLET ORAL DAILY
Qty: 90 TABLET | Refills: 4 | Status: SHIPPED | OUTPATIENT
Start: 2020-11-19 | End: 2021-04-14 | Stop reason: SDUPTHER

## 2020-11-19 RX ORDER — OFLOXACIN 3 MG/ML
SOLUTION/ DROPS OPHTHALMIC
COMMUNITY
Start: 2020-11-03 | End: 2021-07-13

## 2020-11-19 RX ORDER — PREDNISOLONE ACETATE 10 MG/ML
SUSPENSION/ DROPS OPHTHALMIC
COMMUNITY
Start: 2020-11-03 | End: 2021-07-13

## 2020-11-19 NOTE — PATIENT INSTRUCTIONS
Increase lisinopril to 20 mg p.o. daily  Continue to work on your diet and activity  Always keep glucose source in case of low blood sugar  Annual eye exam and flu vaccine  Labs before follow-up.

## 2020-11-19 NOTE — PROGRESS NOTES
Endocrine Progress Note Outpatient     Patient Care Team:  Cuca Aquino MD as PCP - General (Family Medicine)    Chief Complaint: Follow up type 2 diabetes    HPI: 65-year-old male with history of type 2 diabetes, hypertension, hyperlipidemia and obesity is here for follow-up.    For type 2 diabetes he is currently on Janumet XR 50/thousand, 2 tablets p.o. daily, glimepiride 2 mg twice a day, Invokana 100 medium daily and Humalog mix 75/2520 units twice a day.  He did bring in blood sugar records for review and they are running mostly between 100-160.  He does not have low blood sugars.  Is trying his best for his diet.  He has not gotten flu or pneumonia vaccine.      Hypertension: Well-controlled.    Hyperlipidemia: On atorvastatin.    Obesity: He is advised to continue to work on his diet and activity.    Past Medical History:   Diagnosis Date   • Abnormal liver function tests 09/27/2017   • Bilateral lower extremity edema 02/05/2019   • BMI 40.0-44.9, adult (CMS/[facility]) 02/05/2019   • CAD (coronary artery disease) 10/24/2017   • Cellulitis of leg, right 04/02/2019   • Chest pain 10/24/2017   • Cough 02/06/2018   • Diabetes mellitus, type II (CMS/[facility]) 10/24/2017   • Hyperlipidemia 09/27/2017   • Hypertension, benign 09/27/2017   • MI (myocardial infarction) (CMS/[facility]) 2008   • Neuropathy 08/07/2018   • Peripheral neuropathy    • Screening for colon cancer 02/05/2019   • Screening PSA (prostate specific antigen) 02/05/2019   • Skin abscess 09/18/2018   • Sore throat 02/06/2018   • Special screening for malignant neoplasm of prostate 11/30/2017   • Type 2 diabetes, uncontrolled, with neuropathy (CMS/[facility]) 09/27/2017    DM typeII with peripheral neuropathy uncontrolled   • URI, acute 02/06/2018   • Well adult exam 11/30/2017       Social History     Socioeconomic History   • Marital status:      Spouse name: Not on file   • Number of children: Not on file   • Years of education: Not on file   • Highest  education level: Not on file   Tobacco Use   • Smoking status: Never Smoker   • Smokeless tobacco: Never Used   Substance and Sexual Activity   • Alcohol use: No     Frequency: Never   • Drug use: No   • Sexual activity: Defer       Family History   Problem Relation Age of Onset   • Diabetes Mother    • Heart disease Mother    • Hypertension Mother    • Heart disease Father    • Diabetes Brother        No Known Allergies    ROS:   Constitutional:  Denies fatigue, tiredness.    Eyes:  Denies change in visual acuity   HENT:  Denies nasal congestion or sore throat   Respiratory: denies cough, shortness of breath.   Cardiovascular:  denies chest pain, edema   GI:  Denies abdominal pain, nausea, vomiting.   Musculoskeletal:  Denies back pain or joint pain   Integument:  Denies dry skin and rash   Neurologic:  Denies headache, focal weakness or sensory changes   Endocrine:  Denies polyuria or polydipsia   Psychiatric:  Denies depression or anxiety      Vitals:    11/19/20 1459   BP: 152/75   Pulse: 81   Temp: 98 °F (36.7 °C)   SpO2: 98%       Physical Exam:  GEN: NAD, conversant, obese  EYES: EOMI, PERRL, no conjunctival erythema  NECK: no thyromegaly, full ROM   CV: RRR, no murmurs/rubs/gallops, no peripheral edema  LUNG: CTAB, no wheezes/rales/ronchi  SKIN: no rashes, no acanthosis  MSK: no deformities, full ROM of all extremities  NEURO: no tremors, DTR normal  PSYCH: AOX3, appropriate mood, affect normal  Feet: Has calluses on both feet, no ulcer      Results Review:     I reviewed the patient's new clinical results.    Lab Results   Component Value Date    HGBA1C 6.5 11/13/2020    HGBA1C 7.3 (H) 05/22/2020    HGBA1C 6.1 (H) 09/03/2019      Lab Results   Component Value Date    GLUCOSE 99 12/01/2017    BUN 13 11/13/2020    CREATININE 1.04 11/13/2020    EGFRIFNONA 75 11/13/2020    EGFRIFAFRI 87 11/13/2020    BCR 13 11/13/2020    K 3.8 11/13/2020    CO2 26 11/13/2020    CALCIUM 8.9 11/13/2020    PROTENTOTREF 6.8  11/13/2020    ALBUMIN 3.9 11/13/2020    LABIL2 1.3 11/13/2020    AST 36 11/13/2020    ALT 32 11/13/2020    CHOL 87 (L) 12/01/2017    TRIG 258 (H) 11/13/2020    LDL 43 11/13/2020    HDL 30 (L) 11/13/2020     Lab Results   Component Value Date    TSH 3.51 12/01/2017         Medication Review: Reviewed.       Current Outpatient Medications:   •  ammonium lactate (Lac-Hydrin) 12 % cream, Apply  topically to the appropriate area as directed Daily. Both lower legs, Disp: 385 g, Rfl: 2  •  aspirin (ASPIR) 81 MG EC tablet, Take 1 tablet by mouth 2 (Two) Times a Day. aspir-81  81MG oral tablet delayed release, Disp: 180 tablet, Rfl: 1  •  atorvastatin (Lipitor) 10 MG tablet, Take 1 tablet by mouth Daily., Disp: 30 tablet, Rfl: 11  •  bumetanide (Bumex) 1 MG tablet, Take 1 tablet by mouth Daily., Disp: 90 tablet, Rfl: 3  •  dicyclomine (BENTYL) 10 MG capsule, TAKE 1 CAPSULE BY MOUTH 1-4 TIMES DAILY AS NEEDED, Disp: 360 capsule, Rfl: 3  •  gabapentin (NEURONTIN) 600 MG tablet, Take 1 tablet by mouth 4 (Four) Times a Day., Disp: 120 tablet, Rfl: 3  •  glimepiride (AMARYL) 2 MG tablet, TAKE ONE (1) TABLET BY MOUTH TWICE DAILY, Disp: 180 tablet, Rfl: 3  •  glucose blood test strip, Check BS twice a day DX:E11.65, Disp: 300 each, Rfl: 1  •  HumaLOG Mix 75/25 KwikPen (75-25) 100 UNIT/ML suspension pen-injector pen, INJECT 25 UNITS SUBCUTANEOUSLY INTO THE APPROPRIATE AREA AS DIRECTED TWICE DAILY WITH MEALS, Disp: 15 mL, Rfl: 7  •  INVOKANA 100 MG tablet, TAKE (1) TABLET BY MOUTH DAILY, Disp: 90 tablet, Rfl: 3  •  lisinopril (PRINIVIL,ZESTRIL) 10 MG tablet, TAKE (1) TABLET BY MOUTH ONCE DAILY, Disp: 30 tablet, Rfl: 5  •  mupirocin (Bactroban) 2 % ointment, Apply  topically to the appropriate area as directed 2 (Two) Times a Day., Disp: 30 g, Rfl: 2  •  ofloxacin (OCUFLOX) 0.3 % ophthalmic solution, INSTILL 1 DROP INTO LEFT EYE 4 TIMES DAILY FOR 7 DAYS START DAY AFTER SURGERY, Disp: , Rfl:   •  prednisoLONE acetate (PRED FORTE) 1 %  ophthalmic suspension, INSTILL 1 DROP INTO LEFT EYE 4 TIMES DAILY FOR 7 DAYS THEN 3 TIMES DAY FOR 7 DAYS THEN TWICE A DAY FOR 7 DAYS THEN DAILY FOR 7 DAYS. START T, Disp: , Rfl:   •  SITagliptin-metFORMIN HCl ER (JANUMET XR)  MG tablet, Take 1 tablet by mouth 2 (Two) Times a Day., Disp: , Rfl:   •  TRUEPLUS PEN NEEDLES 32G X 4 MM misc, USE AS DIRECTED WITH INSULIN, Disp: 300 each, Rfl: 3      Assessment/Plan   1.  Diabetes mellitus type 2: Excellent control with A1c of 6.5%.  I have reviewed his blood sugar records.  At this time I will continue his current regimen and follow blood sugars and A1c.  I advised him to get annual eye exam and flu vaccine however he does not want to get a flu shot but he is interested in getting pneumonia vaccine today.  Advised to always keep glucose source with him in case of low blood sugar.    2.  Hypertension: Uncontrolled with high blood pressure, will increase lisinopril to 20 mg p.o. daily and follow blood pressure.  Advised to decrease salt intake.    3.  Hyperlipidemia: On  atorvastatin 10 mg p.o. daily.    4.  Obesity: Advised to continue work on diet and activity and try to lose some weight.            Tono Allison MD FACE.    Much of the above report is an electronic transcription/translation of the spoken language to printed text using Dragon Software. As such, the subtleties and finesse of the spoken language may permit erroneous, or at times, nonsensical words or phrases to be inadvertently transcribed; thus changes may be made at a later date to rectify these errors.

## 2020-11-24 ENCOUNTER — OFFICE VISIT (OUTPATIENT)
Dept: FAMILY MEDICINE CLINIC | Facility: CLINIC | Age: 65
End: 2020-11-24

## 2020-11-24 VITALS
DIASTOLIC BLOOD PRESSURE: 74 MMHG | HEIGHT: 71 IN | SYSTOLIC BLOOD PRESSURE: 133 MMHG | BODY MASS INDEX: 41.44 KG/M2 | OXYGEN SATURATION: 94 % | HEART RATE: 87 BPM | WEIGHT: 296 LBS | TEMPERATURE: 97.3 F

## 2020-11-24 DIAGNOSIS — R15.9 ENCOPRESIS: ICD-10-CM

## 2020-11-24 DIAGNOSIS — I10 HYPERTENSION, BENIGN: Primary | ICD-10-CM

## 2020-11-24 DIAGNOSIS — G89.29 CHRONIC RIGHT SHOULDER PAIN: ICD-10-CM

## 2020-11-24 DIAGNOSIS — I87.2 VENOUS INSUFFICIENCY OF BOTH LOWER EXTREMITIES: ICD-10-CM

## 2020-11-24 DIAGNOSIS — M25.511 CHRONIC RIGHT SHOULDER PAIN: ICD-10-CM

## 2020-11-24 PROBLEM — M19.011 ARTHRITIS OF RIGHT GLENOHUMERAL JOINT: Status: ACTIVE | Noted: 2020-11-24

## 2020-11-24 PROCEDURE — 99214 OFFICE O/P EST MOD 30 MIN: CPT | Performed by: FAMILY MEDICINE

## 2020-11-24 RX ORDER — ACETAMINOPHEN AND CODEINE PHOSPHATE 300; 30 MG/1; MG/1
1 TABLET ORAL 2 TIMES DAILY PRN
COMMUNITY
Start: 2020-11-19 | End: 2021-07-13

## 2020-11-24 NOTE — PROGRESS NOTES
Subjective   Haider Holland is a 65 y.o. male.   Chief Complaint   Patient presents with   • Hypertension   • Coronary Artery Disease   • Diarrhea       History of Present Illness   Patient here today for a 6 month follow up for hypertension, edema and CAD. Patient states his bp usually runs pretty good at home, about like his reading today of 133/74. He states he forgot his blood pressure log today. He states he is having some problems with diarrhea. He states that once he eats, or just takes his pills with water, it seems to go straight through him.   Above reviewed and verified    HTN - stable.  B/P well controlled.     New c/o - after eating for past 4-6 months, gets a lot of loose stool after he eats, and at random times.   Will also have encopresis.  Oftentimes he will soil himself when he is out away from home and will have to go home and clean up before he can go into stores.  Has been on Bentyl for years.  Can't tell me why.  Not sure why he takes bentyl or who started.     Eyes - had eye surgery last week.  Has follow-up in 1 week.    Ongoing c/o right shoulder pain.  We x-rayed left shoulder in February.  He had 2 weeks of PT in late winter (almost 1 year ago).  His left shoulder does not hurt him and has pretty good range of motion.  He states that the right shoulder now is painful, it is worse if he sleeps on that right side.  He cannot raise his right shoulder above level.  He states he had a fall about 2 weeks ago where he slipped on a wet deck and fell down.  He tried to grab something with his right arm and thinks his right shoulder may have gotten a little worse since then.      Patient Active Problem List    Diagnosis Date Noted   • Venous insufficiency of both lower extremities 02/17/2020   • Mild nonproliferative diabetic retinopathy without macular edema associated with type 2 diabetes mellitus (CMS/[facility]) 01/20/2020     Note Last Updated: 1/20/2020     Dr. Mcleod  [facility] opto - Dr. 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     • Body mass index (BMI) of 40.0-44.9 in adult (CMS/[facility]) 02/05/2019   • Edema of lower extremity 02/05/2019   • Neuropathy 08/07/2018   • Coronary artery disease 10/24/2017     Note Last Updated: 12/13/2019     MI in 2008 and 2012.  Had cath with stent - 2017     • Uncontrolled type 2 diabetes mellitus with hyperglycemia (CMS/[facility]) 10/24/2017     Note Last Updated: 1/11/2020     Managed by endocrinology     • Abnormal liver function tests 09/27/2017   • Diabetic peripheral neuropathy associated with type 2 diabetes mellitus (CMS/[facility]) 09/27/2017   • Mixed hyperlipidemia 09/27/2017   • Hypertension, benign 09/27/2017           Past Surgical History:   Procedure Laterality Date   • CARDIAC CATHETERIZATION  11/27/2017, 2008    with stent in 2017   • CHOLECYSTECTOMY  12/11/2017   • EYE SURGERY  2019   • EYE SURGERY Left 11/19/2020   • OTHER SURGICAL HISTORY      rt arm sx     Current Outpatient Medications on File Prior to Visit   Medication Sig   • ammonium lactate (Lac-Hydrin) 12 % cream Apply  topically to the appropriate area as directed Daily. Both lower legs   • aspirin (ASPIR) 81 MG EC tablet Take 1 tablet by mouth 2 (Two) Times a Day. aspir-81  81MG oral tablet delayed release   • atorvastatin (Lipitor) 10 MG tablet Take 1 tablet by mouth Daily.   • bumetanide (Bumex) 1 MG tablet Take 1 tablet by mouth Daily.   • gabapentin (NEURONTIN) 600 MG tablet Take 1 tablet by mouth 4 (Four) Times a Day.   • glimepiride (AMARYL) 2 MG tablet TAKE ONE (1) TABLET BY MOUTH TWICE DAILY   • glucose blood test strip Check BS twice a day DX:E11.65   • HumaLOG Mix 75/25 KwikPen (75-25) 100  UNIT/ML suspension pen-injector pen INJECT 25 UNITS SUBCUTANEOUSLY INTO THE APPROPRIATE AREA AS DIRECTED TWICE DAILY WITH MEALS   • INVOKANA 100 MG tablet TAKE (1) TABLET BY MOUTH DAILY   • lisinopril (PRINIVIL,ZESTRIL) 20 MG tablet Take 1 tablet by mouth Daily.   • mupirocin (Bactroban) 2 % ointment Apply  topically to the appropriate area as directed 2 (Two) Times a Day.   • ofloxacin (OCUFLOX) 0.3 % ophthalmic solution INSTILL 1 DROP INTO LEFT EYE 4 TIMES DAILY FOR 7 DAYS START DAY AFTER SURGERY   • prednisoLONE acetate (PRED FORTE) 1 % ophthalmic suspension INSTILL 1 DROP INTO LEFT EYE 4 TIMES DAILY FOR 7 DAYS THEN 3 TIMES DAY FOR 7 DAYS THEN TWICE A DAY FOR 7 DAYS THEN DAILY FOR 7 DAYS. START T   • SITagliptin-metFORMIN HCl ER (JANUMET XR)  MG tablet Take 1 tablet by mouth 2 (Two) Times a Day.   • TRUEPLUS PEN NEEDLES 32G X 4 MM misc USE AS DIRECTED WITH INSULIN   • [DISCONTINUED] dicyclomine (BENTYL) 10 MG capsule TAKE 1 CAPSULE BY MOUTH 1-4 TIMES DAILY AS NEEDED   • acetaminophen-codeine (TYLENOL #3) 300-30 MG per tablet Take 1 tablet by mouth 2 (Two) Times a Day As Needed.     No current facility-administered medications on file prior to visit.      No Known Allergies  Social History     Socioeconomic History   • Marital status:      Spouse name: Not on file   • Number of children: Not on file   • Years of education: Not on file   • Highest education level: Not on file   Tobacco Use   • Smoking status: Never Smoker   • Smokeless tobacco: Never Used   Substance and Sexual Activity   • Alcohol use: No     Frequency: Never   • Drug use: No   • Sexual activity: Defer     Family History   Problem Relation Age of Onset   • Diabetes Mother    • Heart disease Mother    • Hypertension Mother    • Heart disease Father    • Diabetes Brother      The following portions of the patient's history were reviewed and updated as appropriate: allergies, current medications, past family history, past medical  "history, past social history, past surgical history and problem list.    Review of Systems   Constitutional: Negative for chills and fever.   Respiratory: Negative for choking, chest tightness and shortness of breath.    Neurological: Negative for headache.       Objective   /74 (BP Location: Right arm, Patient Position: Sitting, Cuff Size: Adult)   Pulse 87   Temp 97.3 °F (36.3 °C) (Infrared)   Ht 180.3 cm (70.98\")   Wt 134 kg (296 lb)   SpO2 94%   BMI 41.30 kg/m²   Physical Exam  Constitutional:       General: He is not in acute distress.     Appearance: He is well-developed. He is obese.      Comments: Wearing a face mask     HENT:      Head: Normocephalic and atraumatic.   Eyes:      Conjunctiva/sclera: Conjunctivae normal.   Neck:      Musculoskeletal: Normal range of motion.   Cardiovascular:      Rate and Rhythm: Normal rate.   Pulmonary:      Effort: Pulmonary effort is normal.   Musculoskeletal: Normal range of motion.      Comments: He has tenderness to palpation over the right shoulder.  He has clear impingement signs.  Cannot raise right shoulder above level.  Cannot do liftoff.  He has winging of the right arm.  Good strength of the arm if his elbow is at his side.   Skin:     General: Skin is warm and dry.      Findings: No rash.   Neurological:      Mental Status: He is alert and oriented to person, place, and time.   Psychiatric:         Behavior: Behavior normal.         Assessment/Plan   Diagnoses and all orders for this visit:    1. Hypertension, benign (Primary)    2. Venous insufficiency of both lower extremities    3. Encopresis    4. Chronic right shoulder pain  -     XR Shoulder 2+ View Right    Blood pressure is well controlled.  Continue all current medications for treatment of hypertension.  Encopresis-I suspect this is an effect of his Metformin.  We will have him discontinue Janumet at least temporarily.  I gave him 3 weeks samples of Januvia 100 mg tablets.  He will take 1 " tablet/day.  We will recheck him in about 2 weeks and see if the stooling problem is better.  If not, will consider some Questran as he may be having some postcholecystectomy diarrhea.  He also needs to have some type of annual physical for his insurance by the end of the year.  We will try to accommodate that as well.  We will do an x-ray of his right shoulder today.  If there are abnormalities we will proceed with an MRI.  It seems like he has a rotator cuff injury-possibly a tear versus impingement.  We will follow up on that at the next visit as well.  Keep follow-up with his endocrinologist regarding his diabetes.         Return in about 2 weeks (around 12/8/2020) for for physical - annual.    Call with any problems or concerns before next visit

## 2020-12-07 ENCOUNTER — OFFICE VISIT (OUTPATIENT)
Dept: ORTHOPEDIC SURGERY | Facility: CLINIC | Age: 65
End: 2020-12-07

## 2020-12-07 VITALS
SYSTOLIC BLOOD PRESSURE: 146 MMHG | BODY MASS INDEX: 41.3 KG/M2 | WEIGHT: 295 LBS | DIASTOLIC BLOOD PRESSURE: 77 MMHG | HEIGHT: 71 IN | HEART RATE: 90 BPM

## 2020-12-07 DIAGNOSIS — M19.011 OSTEOARTHRITIS OF RIGHT GLENOHUMERAL JOINT: Primary | ICD-10-CM

## 2020-12-07 PROCEDURE — 20610 DRAIN/INJ JOINT/BURSA W/O US: CPT | Performed by: ORTHOPAEDIC SURGERY

## 2020-12-07 PROCEDURE — 99203 OFFICE O/P NEW LOW 30 MIN: CPT | Performed by: ORTHOPAEDIC SURGERY

## 2020-12-07 RX ORDER — TRIAMCINOLONE ACETONIDE 40 MG/ML
80 INJECTION, SUSPENSION INTRA-ARTICULAR; INTRAMUSCULAR ONCE
Status: COMPLETED | OUTPATIENT
Start: 2020-12-07 | End: 2020-12-07

## 2020-12-07 RX ADMIN — TRIAMCINOLONE ACETONIDE 80 MG: 40 INJECTION, SUSPENSION INTRA-ARTICULAR; INTRAMUSCULAR at 08:58

## 2020-12-07 NOTE — PROGRESS NOTES
Patient ID: Haider Holland is a 65 y.o. male.    Chief Complaint:    Chief Complaint   Patient presents with   • Right Shoulder - Consult       HPI:  Haider is a 65-year-old gentleman here with about a month of right shoulder pain.  There is no injury.  He has diffuse pain deep in the shoulder with problems lifting his arm.  Pain is worse at night.  It is somewhat better with naproxen.  Pain is dull and a 5/10.  Past Medical History:   Diagnosis Date   • Abnormal liver function tests 09/27/2017   • Bilateral lower extremity edema 02/05/2019   • BMI 40.0-44.9, adult (CMS/[facility]) 02/05/2019   • CAD (coronary artery disease) 10/24/2017   • Cellulitis of leg, right 04/02/2019   • Chest pain 10/24/2017   • Cough 02/06/2018   • Diabetes mellitus, type II (CMS/[facility]) 10/24/2017   • Hyperlipidemia 09/27/2017   • Hypertension, benign 09/27/2017   • MI (myocardial infarction) (CMS/[facility]) 2008   • Neuropathy 08/07/2018   • Peripheral neuropathy    • Screening for colon cancer 02/05/2019   • Screening PSA (prostate specific antigen) 02/05/2019   • Skin abscess 09/18/2018   • Sore throat 02/06/2018   • Special screening for malignant neoplasm of prostate 11/30/2017   • Type 2 diabetes, uncontrolled, with neuropathy (CMS/[facility]) 09/27/2017    DM typeII with peripheral neuropathy uncontrolled   • URI, acute 02/06/2018   • Well adult exam 11/30/2017       Past Surgical History:   Procedure Laterality Date   • CARDIAC CATHETERIZATION  11/27/2017, 2008    with stent in 2017   • CHOLECYSTECTOMY  12/11/2017   • EYE SURGERY  2019   • EYE SURGERY Left 11/19/2020   • OTHER SURGICAL HISTORY      rt arm sx       Family History   Problem Relation Age of Onset   • Diabetes Mother    • Heart disease Mother    • Hypertension Mother    • Heart disease Father    • Diabetes Brother           Social History     Occupational History   • Not on file   Tobacco Use   • Smoking status: Never Smoker   • Smokeless tobacco: Never Used   Substance and Sexual  "Activity   • Alcohol use: No     Frequency: Never   • Drug use: No   • Sexual activity: Defer      Review of Systems   Respiratory: Negative for chest tightness.    Musculoskeletal: Positive for arthralgias.       Objective:    /77   Pulse 90   Ht 180.3 cm (70.98\")   Wt 134 kg (295 lb)   BMI 41.17 kg/m²     Physical Examination:  He is a pleasant male in no distress. He is alert and oriented x3 and appears his stated age.  Right shoulder demonstrates intact skin.  Mild supraspinatus atrophy.  Mild pain over the bicep groove.  Passive elevation 160 degrees abduction 130 degrees external rotation 40 degrees internal rotation S1.  He has mild pain and weakness on Speed, McIntosh, supraspinatus testing.  Belly press and liftoff are 4/5.  Sensory and motor exam are intact all distributions. Radial pulse is palpable and capillary refill is less than two seconds to all digits    Imaging:  Previous x-rays demonstrate moderate degenerative joint disease    Assessment:  Right shoulder degenerative joint disease    Plan:  Treatment options discussed  Risks and benefits of the injection were discussed. Under sterile technique and written consent I injected 80mg of Kenalog and 2cc of 1% Lidocaine plain into the shoulder and subacromial space. It was well tolerated. Postinjection instructions were given.  See me as needed      Procedures         Disclaimer: Please note that areas of this note were completed with computer voice recognition software.  Quite often unanticipated grammatical, syntax, homophones, and other interpretive errors are inadvertently transcribed by the computer software. Please excuse any errors that have escaped final proofreading.  "

## 2020-12-08 ENCOUNTER — OFFICE VISIT (OUTPATIENT)
Dept: FAMILY MEDICINE CLINIC | Facility: CLINIC | Age: 65
End: 2020-12-08

## 2020-12-08 VITALS
HEIGHT: 71 IN | OXYGEN SATURATION: 96 % | HEART RATE: 78 BPM | WEIGHT: 299.6 LBS | BODY MASS INDEX: 41.94 KG/M2 | SYSTOLIC BLOOD PRESSURE: 163 MMHG | DIASTOLIC BLOOD PRESSURE: 69 MMHG | TEMPERATURE: 97.8 F

## 2020-12-08 DIAGNOSIS — M19.011 ARTHRITIS OF RIGHT GLENOHUMERAL JOINT: ICD-10-CM

## 2020-12-08 DIAGNOSIS — Z00.00 PHYSICAL EXAM, ANNUAL: Primary | ICD-10-CM

## 2020-12-08 PROCEDURE — 99397 PER PM REEVAL EST PAT 65+ YR: CPT | Performed by: FAMILY MEDICINE

## 2020-12-08 RX ORDER — NAPROXEN SODIUM 220 MG
220 TABLET ORAL 2 TIMES DAILY PRN
COMMUNITY
End: 2022-09-09

## 2020-12-08 NOTE — PROGRESS NOTES
Subjective   Haider Holland is a 65 y.o. male.   Chief Complaint   Patient presents with   • Annual Exam       History of Present Illness   Patient here for physical exam today for his insurance. He states the diarrhea he was having from previous visit has gotten better, but not gone. He has also made some changes to his diet which has helped as well.   Above reviewed and verified.    Regarding his diabetes, which is actually managed by Dr. Allison, I thought he had Metformin induced diarrhea.  I had him stop his Janumet and put him on Januvia only.  For the 3 weeks that he was off of Janumet he had no diarrhea at all.  He ran out of the Januvia samples and went back on Janumet.  The loose stool was reportedly not as bad now and there is something he reports he can live with.  He has an appointment with Dr. Mcleod soon regarding his diabetic eye problems.    HTN-B/P still high even after increase of lisinopril.  Checks B/P at home - usually 130 - 140 at home.  Quite a bit higher than that here today.    + Fairly recently for some shoulder problems as well.  I sent him down to Dr. Rivas.  X-rays showed moderate right glenohumeral arthritic changes and a spur on the inferior surface of the acromion.  He tells me that he got a shot of steroids and was told to take Aleve.    Colonoscopy 1-2 years ago - DR. Khan in [place].      He reports that he had a Pneumovax done at his endocrinologist office, but I do not have a record of that.        Patient Active Problem List    Diagnosis Date Noted   • Arthritis of right glenohumeral joint 11/24/2020   • Venous insufficiency of both lower extremities 02/17/2020   • Mild nonproliferative diabetic retinopathy without macular edema associated with type 2 diabetes mellitus (CMS/[facility]) 01/20/2020     Note Last Updated: 1/20/2020     Dr. Mcleod  [facility] - Dr. 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     • Body mass index (BMI) of 40.0-44.9 in adult (CMS/[facility]) 02/05/2019   • Edema of lower extremity 02/05/2019   • Neuropathy 08/07/2018   • Coronary artery disease 10/24/2017     Note Last Updated: 12/13/2019     MI in 2008 and 2012.  Had cath with stent - 2017     • Uncontrolled type 2 diabetes mellitus with hyperglycemia (CMS/[facility]) 10/24/2017     Note Last Updated: 1/11/2020     Managed by endocrinology     • Abnormal liver function tests 09/27/2017   • Diabetic peripheral neuropathy associated with type 2 diabetes mellitus (CMS/[facility]) 09/27/2017   • Mixed hyperlipidemia 09/27/2017   • Hypertension, benign 09/27/2017           Past Surgical History:   Procedure Laterality Date   • CARDIAC CATHETERIZATION  11/27/2017, 2008    with stent in 2017   • CHOLECYSTECTOMY  12/11/2017   • EYE SURGERY  2019   • EYE SURGERY Left 11/19/2020   • OTHER SURGICAL HISTORY      rt arm sx     Current Outpatient Medications on File Prior to Visit   Medication Sig   • ammonium lactate (Lac-Hydrin) 12 % cream Apply  topically to the appropriate area as directed Daily. Both lower legs   • aspirin (ASPIR) 81 MG EC tablet Take 1 tablet by mouth 2 (Two) Times a Day. aspir-81  81MG oral tablet delayed release   • atorvastatin (Lipitor) 10 MG tablet Take 1 tablet by mouth Daily.   • bumetanide (Bumex) 1 MG tablet Take 1 tablet by mouth Daily.   • gabapentin (NEURONTIN) 600 MG tablet Take 1 tablet by mouth 4 (Four) Times a Day.   • glimepiride (AMARYL) 2 MG tablet TAKE ONE (1) TABLET BY MOUTH TWICE DAILY   • glucose blood test strip Check BS twice a day DX:E11.65   • HumaLOG Mix 75/25 KwikPen (75-25) 100 UNIT/ML suspension pen-injector pen INJECT 25 UNITS SUBCUTANEOUSLY INTO THE APPROPRIATE AREA AS DIRECTED TWICE DAILY  WITH MEALS   • INVOKANA 100 MG tablet TAKE (1) TABLET BY MOUTH DAILY   • lisinopril (PRINIVIL,ZESTRIL) 20 MG tablet Take 1 tablet by mouth Daily.   • mupirocin (Bactroban) 2 % ointment Apply  topically to the appropriate area as directed 2 (Two) Times a Day.   • naproxen sodium (ALEVE) 220 MG tablet Take 220 mg by mouth 2 (Two) Times a Day As Needed.   • ofloxacin (OCUFLOX) 0.3 % ophthalmic solution INSTILL 1 DROP INTO LEFT EYE 4 TIMES DAILY FOR 7 DAYS START DAY AFTER SURGERY   • prednisoLONE acetate (PRED FORTE) 1 % ophthalmic suspension INSTILL 1 DROP INTO LEFT EYE 4 TIMES DAILY FOR 7 DAYS THEN 3 TIMES DAY FOR 7 DAYS THEN TWICE A DAY FOR 7 DAYS THEN DAILY FOR 7 DAYS. START T   • SITagliptin-metFORMIN HCl ER (JANUMET XR)  MG tablet Take 1 tablet by mouth 2 (Two) Times a Day.   • TRUEPLUS PEN NEEDLES 32G X 4 MM misc USE AS DIRECTED WITH INSULIN   • acetaminophen-codeine (TYLENOL #3) 300-30 MG per tablet Take 1 tablet by mouth 2 (Two) Times a Day As Needed.     No current facility-administered medications on file prior to visit.      No Known Allergies  Social History     Socioeconomic History   • Marital status:      Spouse name: Not on file   • Number of children: Not on file   • Years of education: Not on file   • Highest education level: Not on file   Tobacco Use   • Smoking status: Never Smoker   • Smokeless tobacco: Never Used   Substance and Sexual Activity   • Alcohol use: No     Frequency: Never   • Drug use: No   • Sexual activity: Defer     Family History   Problem Relation Age of Onset   • Diabetes Mother    • Heart disease Mother    • Hypertension Mother    • Heart disease Father    • Diabetes Brother      The following portions of the patient's history were reviewed and updated as appropriate: allergies, current medications, past family history, past medical history, past social history, past surgical history and problem list.    Review of Systems   Constitutional: Negative for chills and  "fever.   Respiratory: Negative for cough and shortness of breath.    Cardiovascular: Negative for chest pain and leg swelling.   Neurological: Negative for seizures, syncope and headache.       Objective   /69 (BP Location: Left arm, Patient Position: Sitting, Cuff Size: Adult)   Pulse 78   Temp 97.8 °F (36.6 °C) (Infrared)   Ht 180.3 cm (70.98\")   Wt 136 kg (299 lb 9.6 oz)   SpO2 96%   BMI 41.80 kg/m²   Physical Exam  Constitutional:       General: He is not in acute distress.     Appearance: He is well-developed. He is obese. He is not ill-appearing.   HENT:      Head: Normocephalic and atraumatic.   Eyes:      Conjunctiva/sclera: Conjunctivae normal.   Neck:      Musculoskeletal: Normal range of motion.   Cardiovascular:      Rate and Rhythm: Normal rate and regular rhythm.      Heart sounds: Murmur present. Systolic murmur present.   Pulmonary:      Effort: Pulmonary effort is normal. No respiratory distress.      Breath sounds: Decreased breath sounds present. No wheezing, rhonchi or rales.   Musculoskeletal: Normal range of motion.      Comments: Right shoulder is sore anteriorly and laterally.  Some minimal winging with abduction.   Skin:     General: Skin is warm and dry.      Findings: No rash.   Neurological:      Mental Status: He is alert and oriented to person, place, and time.   Psychiatric:         Behavior: Behavior normal.           Office Visit on 11/19/2020   Component Date Value Ref Range Status   • Glucose 11/19/2020 134* 70 - 105 mg/dL Final    Serial Number: [id number]:  [id number]   Orders Only on 11/13/2020   Component Date Value Ref Range Status   • Glucose 11/13/2020 112* 65 - 99 mg/dL Final   • BUN 11/13/2020 13  8 - 27 mg/dL Final   • Creatinine 11/13/2020 1.04  0.76 - 1.27 mg/dL Final   • eGFR Non African Am 11/13/2020 75  >59 mL/min/1.73 Final   • eGFR African Am 11/13/2020 87  >59 mL/min/1.73 Final   • BUN/Creatinine Ratio 11/13/2020 13  10 - 24 Final   • Sodium " 11/13/2020 142  134 - 144 mmol/L Final   • Potassium 11/13/2020 3.8  3.5 - 5.2 mmol/L Final   • Chloride 11/13/2020 106  96 - 106 mmol/L Final   • Total CO2 11/13/2020 26  20 - 29 mmol/L Final   • Calcium 11/13/2020 8.9  8.6 - 10.2 mg/dL Final   • Total Protein 11/13/2020 6.8  6.0 - 8.5 g/dL Final   • Albumin 11/13/2020 3.9  3.8 - 4.8 g/dL Final   • Globulin 11/13/2020 2.9  1.5 - 4.5 g/dL Final   • A/G Ratio 11/13/2020 1.3  1.2 - 2.2 Final   • Total Bilirubin 11/13/2020 0.7  0.0 - 1.2 mg/dL Final   • Alkaline Phosphatase 11/13/2020 86  39 - 117 IU/L Final   • AST (SGOT) 11/13/2020 36  0 - 40 IU/L Final   • ALT (SGPT) 11/13/2020 32  0 - 44 IU/L Final   • Total Cholesterol 11/13/2020 113  100 - 199 mg/dL Final   • Triglycerides 11/13/2020 258* 0 - 149 mg/dL Final   • HDL Cholesterol 11/13/2020 30* >39 mg/dL Final   • VLDL Cholesterol Naresh 11/13/2020 40  5 - 40 mg/dL Final   • LDL Chol Calc (NIH) 11/13/2020 43  0 - 99 mg/dL Final   • Creatinine, Urine 11/13/2020 23.8  Not Estab. mg/dL Final   • Microalbumin, Urine 11/13/2020 <3.0  Not Estab. ug/mL Final    **Verified by repeat analysis**   • Microalbumin/Creatinine Ratio 11/13/2020 <13  0 - 29 mg/g creat Final    Comment:                        Normal:                0 -  29                         Moderately increased: 30 - 300                         Severely increased:       >300     • Hemoglobin A1C 11/13/2020 6.5  %Hb Final    Comment: Reference Range:  American Diabetes Association (ADA) Guidelines:  <5.7: Decreased risk for diabetes  5.7 - 6.4: Increased risk for diabetes  >6.4: Ongoing Hyperglycemia of any cause  <7.0: Glycemic control for adults with diabetes     • Mean Bld Glu Estim. 11/13/2020 140  mg/dL Final         Assessment/Plan   Diagnoses and all orders for this visit:    1. Physical exam, annual (Primary)    2. Arthritis of right glenohumeral joint    UTD on lipid screen.  Keep follow-up with Dr. Allison for management of diabetes.  Keep follow-up with  Dr. Stewart regarding his shoulder arthritis and probable impingement syndrome.  Regarding hypertension-continue to check blood pressures at home.  If blood pressure remains above 140/90, please let me know.  Will get Hep C screen at next labs  Colonoscopy will be repeated according to instructions from Dr. Khan.         Return in about 7 months (around 7/8/2021) for Recheck - general.    Call with any problems or concerns before next visit

## 2021-01-22 RX ORDER — SITAGLIPTIN AND METFORMIN HYDROCHLORIDE 1000; 50 MG/1; MG/1
1 TABLET, FILM COATED, EXTENDED RELEASE ORAL 2 TIMES DAILY
Qty: 45 TABLET | Refills: 0 | COMMUNITY
Start: 2021-01-22 | End: 2021-12-16 | Stop reason: SDUPTHER

## 2021-03-29 DIAGNOSIS — E11.42 DIABETIC PERIPHERAL NEUROPATHY ASSOCIATED WITH TYPE 2 DIABETES MELLITUS (HCC): ICD-10-CM

## 2021-03-30 RX ORDER — GABAPENTIN 600 MG/1
TABLET ORAL
Qty: 120 TABLET | Refills: 0 | Status: SHIPPED | OUTPATIENT
Start: 2021-03-30 | End: 2021-06-04 | Stop reason: SDUPTHER

## 2021-04-01 RX ORDER — GLIMEPIRIDE 2 MG/1
TABLET ORAL
Qty: 180 TABLET | Refills: 0 | Status: SHIPPED | OUTPATIENT
Start: 2021-04-01 | End: 2021-07-02

## 2021-04-14 RX ORDER — ATORVASTATIN CALCIUM 10 MG/1
10 TABLET, FILM COATED ORAL DAILY
Qty: 90 TABLET | Refills: 3 | Status: SHIPPED | OUTPATIENT
Start: 2021-04-14 | End: 2021-12-06 | Stop reason: SDUPTHER

## 2021-04-14 RX ORDER — CANAGLIFLOZIN 100 MG/1
1 TABLET, FILM COATED ORAL DAILY
Qty: 90 TABLET | Refills: 3 | Status: SHIPPED | OUTPATIENT
Start: 2021-04-14 | End: 2021-12-06 | Stop reason: SDUPTHER

## 2021-04-14 RX ORDER — LISINOPRIL 20 MG/1
20 TABLET ORAL DAILY
Qty: 90 TABLET | Refills: 3 | Status: SHIPPED | OUTPATIENT
Start: 2021-04-14 | End: 2022-04-18 | Stop reason: SDUPTHER

## 2021-04-14 RX ORDER — LISINOPRIL 10 MG/1
TABLET ORAL
COMMUNITY
Start: 2021-01-21 | End: 2021-05-18

## 2021-04-14 NOTE — TELEPHONE ENCOUNTER
Caller: [facility], [place] - [address] [facility] - [phone] [facility] [phone] FX    Relationship: Pharmacy    Best call back number: [phone]    Medication needed:   Requested Prescriptions     Pending Prescriptions Disp Refills   • Canagliflozin (Invokana) 100 MG tablet tablet 90 tablet 3     Sig: Take 1 tablet by mouth Daily.   • lisinopril (PRINIVIL,ZESTRIL) 20 MG tablet 90 tablet 4     Sig: Take 1 tablet by mouth Daily.   • atorvastatin (Lipitor) 10 MG tablet 30 tablet 11     Sig: Take 1 tablet by mouth Daily.        When do you need the refill by: 4/14/2021    Does the patient have less than a 3 day supply:  [] Yes  [x] No    What is the patient's preferred pharmacy: [facility], [place] - [address] [facility] - [phone] [facility] [phone] FX<br>[address] - [address] [facility] - [phone] [place] [phone] FX<br>[place], [place] - [address] [facility] - [phone] PH  [phone] FX

## 2021-04-15 DIAGNOSIS — E11.65 UNCONTROLLED TYPE 2 DIABETES MELLITUS WITH HYPERGLYCEMIA (HCC): ICD-10-CM

## 2021-04-23 RX ORDER — PEN NEEDLE, DIABETIC 32GX 5/32"
NEEDLE, DISPOSABLE MISCELLANEOUS
Qty: 100 EACH | Refills: 3 | Status: SHIPPED | OUTPATIENT
Start: 2021-04-23 | End: 2021-10-04

## 2021-05-11 ENCOUNTER — LAB (OUTPATIENT)
Dept: LAB | Facility: HOSPITAL | Age: 66
End: 2021-05-11

## 2021-05-11 DIAGNOSIS — E11.65 UNCONTROLLED TYPE 2 DIABETES MELLITUS WITH HYPERGLYCEMIA (HCC): ICD-10-CM

## 2021-05-11 PROCEDURE — 83036 HEMOGLOBIN GLYCOSYLATED A1C: CPT

## 2021-05-11 PROCEDURE — 82570 ASSAY OF URINE CREATININE: CPT

## 2021-05-11 PROCEDURE — 80053 COMPREHEN METABOLIC PANEL: CPT

## 2021-05-11 PROCEDURE — 82043 UR ALBUMIN QUANTITATIVE: CPT

## 2021-05-11 PROCEDURE — 80061 LIPID PANEL: CPT

## 2021-05-11 PROCEDURE — 36415 COLL VENOUS BLD VENIPUNCTURE: CPT

## 2021-05-11 RX ORDER — AZELASTINE HYDROCHLORIDE 0.5 MG/ML
SOLUTION/ DROPS OPHTHALMIC
COMMUNITY
Start: 2021-04-20

## 2021-05-11 RX ORDER — DICYCLOMINE HYDROCHLORIDE 10 MG/1
CAPSULE ORAL
COMMUNITY
Start: 2021-03-23 | End: 2021-05-18

## 2021-05-18 ENCOUNTER — OFFICE VISIT (OUTPATIENT)
Dept: ENDOCRINOLOGY | Facility: CLINIC | Age: 66
End: 2021-05-18

## 2021-05-18 VITALS
BODY MASS INDEX: 41.86 KG/M2 | HEART RATE: 81 BPM | WEIGHT: 299 LBS | SYSTOLIC BLOOD PRESSURE: 135 MMHG | OXYGEN SATURATION: 98 % | DIASTOLIC BLOOD PRESSURE: 75 MMHG | HEIGHT: 71 IN | TEMPERATURE: 98 F

## 2021-05-18 DIAGNOSIS — E78.2 MIXED HYPERLIPIDEMIA: ICD-10-CM

## 2021-05-18 DIAGNOSIS — E11.42 DIABETIC PERIPHERAL NEUROPATHY ASSOCIATED WITH TYPE 2 DIABETES MELLITUS (HCC): ICD-10-CM

## 2021-05-18 DIAGNOSIS — I10 HYPERTENSION, BENIGN: ICD-10-CM

## 2021-05-18 DIAGNOSIS — E11.65 UNCONTROLLED TYPE 2 DIABETES MELLITUS WITH HYPERGLYCEMIA (HCC): Primary | ICD-10-CM

## 2021-05-18 LAB — GLUCOSE BLDC GLUCOMTR-MCNC: 70 MG/DL (ref 70–105)

## 2021-05-18 PROCEDURE — 82962 GLUCOSE BLOOD TEST: CPT | Performed by: INTERNAL MEDICINE

## 2021-05-18 PROCEDURE — 99214 OFFICE O/P EST MOD 30 MIN: CPT | Performed by: INTERNAL MEDICINE

## 2021-05-18 NOTE — PATIENT INSTRUCTIONS
Continue current management  Follow-up in 6 months with labs  Always keep glucose source in case of low blood sugar  Annual eye exam and flu vaccine  Labs before follow-up

## 2021-05-18 NOTE — PROGRESS NOTES
Endocrine Progress Note Outpatient     Patient Care Team:  Cuca Aquino MD as PCP - General (Family Medicine)    Chief Complaint: Follow up type 2 diabetes    HPI: 66-year-old male with history of type 2 diabetes, hypertension, hyperlipidemia and obesity is here for follow-up.    For type 2 diabetes he is currently on Janumet XR 50/thousand, 2 tablets p.o. daily, glimepiride 2 mg twice a day, Invokana 100 medium daily and Humalog mix 75/25, 20 units twice a day.  He did bring in blood sugar records for review and they are running mostly between 100-160.  He does not have low blood sugars.  Is trying his best for his diet.  He has not gotten flu or pneumonia vaccine.      Hypertension: Well-controlled.    Hyperlipidemia: On atorvastatin.    Obesity: He is advised to continue to work on his diet and activity.    Diabetic peripheral neuropathy: Stable.     Past Medical History:   Diagnosis Date   • Abnormal liver function tests 09/27/2017   • Bilateral lower extremity edema 02/05/2019   • BMI 40.0-44.9, adult (CMS/[facility]) 02/05/2019   • CAD (coronary artery disease) 10/24/2017   • Cellulitis of leg, right 04/02/2019   • Chest pain 10/24/2017   • Cough 02/06/2018   • Diabetes mellitus, type II (CMS/[facility]) 10/24/2017   • Hyperlipidemia 09/27/2017   • Hypertension, benign 09/27/2017   • MI (myocardial infarction) (CMS/[facility]) 2008   • Neuropathy 08/07/2018   • Peripheral neuropathy    • Screening for colon cancer 02/05/2019   • Screening PSA (prostate specific antigen) 02/05/2019   • Skin abscess 09/18/2018   • Sore throat 02/06/2018   • Special screening for malignant neoplasm of prostate 11/30/2017   • Type 2 diabetes, uncontrolled, with neuropathy (CMS/[facility]) 09/27/2017    DM typeII with peripheral neuropathy uncontrolled   • URI, acute 02/06/2018   • Well adult exam 11/30/2017       Social History     Socioeconomic History   • Marital status:      Spouse name: Not on file   • Number of children: Not on file   •  Years of education: Not on file   • Highest education level: Not on file   Tobacco Use   • Smoking status: Never Smoker   • Smokeless tobacco: Never Used   Vaping Use   • Vaping Use: Never used   Substance and Sexual Activity   • Alcohol use: No   • Drug use: No   • Sexual activity: Defer       Family History   Problem Relation Age of Onset   • Diabetes Mother    • Heart disease Mother    • Hypertension Mother    • Heart disease Father    • Diabetes Brother    • Cancer Brother        No Known Allergies    ROS:   Constitutional:  Denies fatigue, tiredness.    Eyes:  Denies change in visual acuity   HENT:  Denies nasal congestion or sore throat   Respiratory: denies cough, shortness of breath.   Cardiovascular:  denies chest pain, edema   GI:  Denies abdominal pain, nausea, vomiting.   Musculoskeletal:  Denies back pain or joint pain   Integument:  Denies dry skin and rash   Neurologic:  Denies headache, focal weakness or sensory changes   Endocrine:  Denies polyuria or polydipsia   Psychiatric:  Denies depression or anxiety      Vitals:    05/18/21 1501   BP: 135/75   Pulse: 81   Temp: 98 °F (36.7 °C)   SpO2: 98%       Physical Exam:  GEN: NAD, conversant, obese  EYES: EOMI, PERRL, no conjunctival erythema  NECK: no thyromegaly, full ROM   CV: RRR, no murmurs/rubs/gallops, no peripheral edema  LUNG: CTAB, no wheezes/rales/ronchi  SKIN: no rashes, no acanthosis  MSK: no deformities, full ROM of all extremities  NEURO: no tremors, DTR normal  PSYCH: AOX3, appropriate mood, affect normal  Feet: Has calluses on both feet, no ulcer      Results Review:     I reviewed the patient's new clinical results.    Lab Results   Component Value Date    HGBA1C 6.9 (H) 05/11/2021    HGBA1C 6.5 11/13/2020    HGBA1C 7.3 (H) 05/22/2020      Lab Results   Component Value Date    GLUCOSE 147 (H) 05/11/2021    BUN 16 05/11/2021    CREATININE 1.06 05/11/2021    EGFRIFNONA 70 05/11/2021    EGFRIFAFRI 87 11/13/2020    BCR 15.1 05/11/2021    K  4.4 05/11/2021    CO2 29.3 (H) 05/11/2021    CALCIUM 9.5 05/11/2021    PROTENTOTREF 6.8 11/13/2020    ALBUMIN 4.00 05/11/2021    LABIL2 1.3 11/13/2020    AST 24 05/11/2021    ALT 18 05/11/2021    CHOL 91 05/11/2021    TRIG 143 05/11/2021    LDL 33 05/11/2021    HDL 33 (L) 05/11/2021     Lab Results   Component Value Date    TSH 3.51 12/01/2017         Medication Review: Reviewed.       Current Outpatient Medications:   •  acetaminophen-codeine (TYLENOL #3) 300-30 MG per tablet, Take 1 tablet by mouth 2 (Two) Times a Day As Needed., Disp: , Rfl:   •  ammonium lactate (Lac-Hydrin) 12 % cream, Apply  topically to the appropriate area as directed Daily. Both lower legs, Disp: 385 g, Rfl: 2  •  aspirin (ASPIR) 81 MG EC tablet, Take 1 tablet by mouth 2 (Two) Times a Day. aspir-81  81MG oral tablet delayed release, Disp: 180 tablet, Rfl: 1  •  atorvastatin (Lipitor) 10 MG tablet, Take 1 tablet by mouth Daily., Disp: 90 tablet, Rfl: 3  •  azelastine (OPTIVAR) 0.05 % ophthalmic solution, INSTILL 1 DROP INTO EACH EYE 1 TO 2 TIMES DAILY, Disp: , Rfl:   •  bumetanide (Bumex) 1 MG tablet, Take 1 tablet by mouth Daily., Disp: 90 tablet, Rfl: 3  •  Canagliflozin (Invokana) 100 MG tablet tablet, Take 1 tablet by mouth Daily., Disp: 90 tablet, Rfl: 3  •  gabapentin (NEURONTIN) 600 MG tablet, Take 1 tablet by mouth 4 times daily, Disp: 120 tablet, Rfl: 0  •  glimepiride (AMARYL) 2 MG tablet, TAKE ONE (1) TABLET BY MOUTH TWICE DAILY, Disp: 180 tablet, Rfl: 0  •  glucose blood test strip, Check BS twice a day DX:E11.65, Disp: 300 each, Rfl: 1  •  HumaLOG Mix 75/25 KwikPen (75-25) 100 UNIT/ML suspension pen-injector pen, INJECT 25 UNITS SUBCUTANEOUSLY INTO THE APPROPRIATE AREA AS DIRECTED TWICE DAILY WITH MEALS, Disp: 15 mL, Rfl: 7  •  lisinopril (PRINIVIL,ZESTRIL) 20 MG tablet, Take 1 tablet by mouth Daily., Disp: 90 tablet, Rfl: 3  •  mupirocin (Bactroban) 2 % ointment, Apply  topically to the appropriate area as directed 2 (Two)  Times a Day., Disp: 30 g, Rfl: 2  •  naproxen sodium (ALEVE) 220 MG tablet, Take 220 mg by mouth 2 (Two) Times a Day As Needed., Disp: , Rfl:   •  ofloxacin (OCUFLOX) 0.3 % ophthalmic solution, INSTILL 1 DROP INTO LEFT EYE 4 TIMES DAILY FOR 7 DAYS START DAY AFTER SURGERY, Disp: , Rfl:   •  prednisoLONE acetate (PRED FORTE) 1 % ophthalmic suspension, INSTILL 1 DROP INTO LEFT EYE 4 TIMES DAILY FOR 7 DAYS THEN 3 TIMES DAY FOR 7 DAYS THEN TWICE A DAY FOR 7 DAYS THEN DAILY FOR 7 DAYS. START T, Disp: , Rfl:   •  SITagliptin-metFORMIN HCl ER (Janumet XR)  MG tablet, Take 1 tablet by mouth 2 (Two) Times a Day., Disp: 45 tablet, Rfl: 0  •  TRUEplus Pen Needles 32G X 4 MM misc, USE AS DIRECTED WITH INSULIN, Disp: 100 each, Rfl: 3      Assessment/Plan   1.  Diabetes mellitus type 2: Excellent control with A1c of 6.9%.  I have reviewed his blood sugar records.  At this time I will continue his current regimen Janumet, Invokana, glimepiride and Humalog mix 75/25 insulin.  And follow blood sugars and A1c.  I advised him to get annual eye exam and flu vaccine however he does not want to get a flu shot but he is interested in getting pneumonia vaccine today.  Advised to always keep glucose source with him in case of low blood sugar.    2.  Hypertension: Much better, continue lisinopril..    3.  Hyperlipidemia: Well-controlled, on  atorvastatin 10 mg p.o. daily.    4.  Obesity: Advised to continue work on diet and activity and try to lose some weight.    5.  Diabetic peripheral neuropathy: Stable.            Tono Allison MD FACE.    Much of the above report is an electronic transcription/translation of the spoken language to printed text using Dragon Software. As such, the subtleties and finesse of the spoken language may permit erroneous, or at times, nonsensical words or phrases to be inadvertently transcribed; thus changes may be made at a later date to rectify these errors.

## 2021-06-04 DIAGNOSIS — E11.42 DIABETIC PERIPHERAL NEUROPATHY ASSOCIATED WITH TYPE 2 DIABETES MELLITUS (HCC): ICD-10-CM

## 2021-06-04 RX ORDER — GABAPENTIN 600 MG/1
TABLET ORAL
Qty: 120 TABLET | Refills: 5 | Status: SHIPPED | OUTPATIENT
Start: 2021-06-04 | End: 2022-05-16

## 2021-06-30 DIAGNOSIS — R60.0 EDEMA OF LOWER EXTREMITY: ICD-10-CM

## 2021-06-30 RX ORDER — BUMETANIDE 1 MG/1
1 TABLET ORAL DAILY
Qty: 90 TABLET | Refills: 3 | Status: SHIPPED | OUTPATIENT
Start: 2021-06-30 | End: 2022-04-08

## 2021-07-02 RX ORDER — GLIMEPIRIDE 2 MG/1
TABLET ORAL
Qty: 60 TABLET | Refills: 5 | Status: SHIPPED | OUTPATIENT
Start: 2021-07-02 | End: 2021-12-06 | Stop reason: SDUPTHER

## 2021-07-09 ENCOUNTER — OFFICE VISIT (OUTPATIENT)
Dept: FAMILY MEDICINE CLINIC | Facility: CLINIC | Age: 66
End: 2021-07-09

## 2021-07-09 VITALS
SYSTOLIC BLOOD PRESSURE: 105 MMHG | TEMPERATURE: 97.5 F | OXYGEN SATURATION: 99 % | DIASTOLIC BLOOD PRESSURE: 59 MMHG | BODY MASS INDEX: 42.45 KG/M2 | WEIGHT: 303.2 LBS | HEART RATE: 74 BPM | HEIGHT: 71 IN

## 2021-07-09 DIAGNOSIS — E11.42 DIABETIC PERIPHERAL NEUROPATHY ASSOCIATED WITH TYPE 2 DIABETES MELLITUS (HCC): ICD-10-CM

## 2021-07-09 DIAGNOSIS — I10 HYPERTENSION, BENIGN: Primary | ICD-10-CM

## 2021-07-09 DIAGNOSIS — G62.9 NEUROPATHY: ICD-10-CM

## 2021-07-09 PROCEDURE — 99213 OFFICE O/P EST LOW 20 MIN: CPT | Performed by: FAMILY MEDICINE

## 2021-07-09 NOTE — PROGRESS NOTES
"Subjective   Haider Holland is a 66 y.o. male.   Chief Complaint   Patient presents with   • Hypertension       History of Present Illness   Patient presents today for a f/u htn. Patient states he checks his blood pressure at home, said it is 170/something, cannot remember numbers.      HTN -typically blood pressure is fairly well controlled.  He states he only sees 170s at home if he is \"arguing with the old lady\".  Denies any side effects of his blood pressure medicines.  No headaches, no shortness of breath.  Swelling in his feet remains unchanged.    Has labs planned for Dr. Allison in November.    He had a colonoscopy in 2017 with Dr. Khan.  We will try to get those records and update the chart.    Requests referral to Podiatry.  Often times feet are numb.  He has known diabetic neuropathy.  He is on gabapentin for this.  He follows with Dr. Allison for comprehensive care of his diabetes.      Patient Active Problem List    Diagnosis Date Noted   • Arthritis of right glenohumeral joint 11/24/2020   • Venous insufficiency of both lower extremities 02/17/2020   • Mild nonproliferative diabetic retinopathy without macular edema associated with type 2 diabetes mellitus (CMS/[facility]) 01/20/2020     Note Last Updated: 1/20/2020     Dr. Mcleod  Local opto - Dr. 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     • Body mass index (BMI) of 40.0-44.9 in adult (CMS/[facility]) 02/05/2019   • Edema of lower extremity 02/05/2019   • Neuropathy 08/07/2018   • Coronary artery disease 10/24/2017     Note Last Updated: 12/13/2019     MI in 2008 and 2012.  Had cath with stent - 2017     • Uncontrolled type 2 diabetes mellitus with " hyperglycemia (CMS/[facility]) 10/24/2017     Note Last Updated: 1/11/2020     Managed by endocrinology     • Abnormal liver function tests 09/27/2017   • Diabetic peripheral neuropathy associated with type 2 diabetes mellitus (CMS/[facility]) 09/27/2017   • Mixed hyperlipidemia 09/27/2017   • Hypertension, benign 09/27/2017           Past Surgical History:   Procedure Laterality Date   • CARDIAC CATHETERIZATION  11/27/2017, 2008    with stent in 2017   • CHOLECYSTECTOMY  12/11/2017   • EYE SURGERY  2019   • EYE SURGERY Left 11/19/2020   • OTHER SURGICAL HISTORY      rt arm sx     Current Outpatient Medications on File Prior to Visit   Medication Sig   • acetaminophen-codeine (TYLENOL #3) 300-30 MG per tablet Take 1 tablet by mouth 2 (Two) Times a Day As Needed.   • ammonium lactate (Lac-Hydrin) 12 % cream Apply  topically to the appropriate area as directed Daily. Both lower legs   • aspirin (ASPIR) 81 MG EC tablet Take 1 tablet by mouth 2 (Two) Times a Day. aspir-81  81MG oral tablet delayed release   • atorvastatin (Lipitor) 10 MG tablet Take 1 tablet by mouth Daily.   • azelastine (OPTIVAR) 0.05 % ophthalmic solution INSTILL 1 DROP INTO EACH EYE 1 TO 2 TIMES DAILY   • bumetanide (Bumex) 1 MG tablet Take 1 tablet by mouth Daily.   • Canagliflozin (Invokana) 100 MG tablet tablet Take 1 tablet by mouth Daily.   • gabapentin (NEURONTIN) 600 MG tablet Take 1 tablet by mouth 4 times per day   • glimepiride (AMARYL) 2 MG tablet TAKE 1 TABLET BY MOUTH TWICE DAILY *F/U:[facility] [phone] [address] [address]*   • glucose blood test strip Check  twice a day DX:E11.65   • HumaLOG Mix 75/25 KwikPen (75-25) 100 UNIT/ML suspension pen-injector pen INJECT 25 UNITS SUBCUTANEOUSLY INTO THE APPROPRIATE AREA AS DIRECTED TWICE DAILY WITH MEALS   • lisinopril (PRINIVIL,ZESTRIL) 20 MG tablet Take 1 tablet by mouth Daily.   • mupirocin (Bactroban) 2 % ointment Apply  topically to the appropriate area as  "directed 2 (Two) Times a Day.   • naproxen sodium (ALEVE) 220 MG tablet Take 220 mg by mouth 2 (Two) Times a Day As Needed.   • ofloxacin (OCUFLOX) 0.3 % ophthalmic solution INSTILL 1 DROP INTO LEFT EYE 4 TIMES DAILY FOR 7 DAYS START DAY AFTER SURGERY   • prednisoLONE acetate (PRED FORTE) 1 % ophthalmic suspension INSTILL 1 DROP INTO LEFT EYE 4 TIMES DAILY FOR 7 DAYS THEN 3 TIMES DAY FOR 7 DAYS THEN TWICE A DAY FOR 7 DAYS THEN DAILY FOR 7 DAYS. START T   • SITagliptin-metFORMIN HCl ER (Janumet XR)  MG tablet Take 1 tablet by mouth 2 (Two) Times a Day.   • TRUEplus Pen Needles 32G X 4 MM misc USE AS DIRECTED WITH INSULIN     No current facility-administered medications on file prior to visit.     No Known Allergies  Social History     Socioeconomic History   • Marital status:      Spouse name: Not on file   • Number of children: Not on file   • Years of education: Not on file   • Highest education level: Not on file   Tobacco Use   • Smoking status: Never Smoker   • Smokeless tobacco: Never Used   Vaping Use   • Vaping Use: Never used   Substance and Sexual Activity   • Alcohol use: No   • Drug use: No   • Sexual activity: Defer     Family History   Problem Relation Age of Onset   • Diabetes Mother    • Heart disease Mother    • Hypertension Mother    • Heart disease Father    • Diabetes Brother    • Cancer Brother        Review of Systems    Objective   /59 (BP Location: Left arm, Patient Position: Sitting, Cuff Size: Adult)   Pulse 74   Temp 97.5 °F (36.4 °C) (Infrared)   Ht 180.3 cm (70.98\")   Wt (!) 138 kg (303 lb 3.2 oz)   SpO2 99%   BMI 42.31 kg/m²   Physical Exam  Constitutional:       Appearance: He is well-developed and overweight.      Comments: Wearing a face mask     HENT:      Head: Normocephalic and atraumatic.   Eyes:      Conjunctiva/sclera: Conjunctivae normal.   Cardiovascular:      Rate and Rhythm: Normal rate.   Pulmonary:      Effort: Pulmonary effort is normal. "   Musculoskeletal:         General: Normal range of motion.      Cervical back: Normal range of motion.   Skin:     General: Skin is warm and dry.      Findings: No rash.   Neurological:      Mental Status: He is alert and oriented to person, place, and time.   Psychiatric:         Behavior: Behavior normal.           Office Visit on 05/18/2021   Component Date Value Ref Range Status   • Glucose 05/18/2021 70  70 - 105 mg/dL Final    Serial Number: [id number]:  [id number]   Lab on 05/11/2021   Component Date Value Ref Range Status   • Total Cholesterol 05/11/2021 91  0 - 200 mg/dL Final   • Triglycerides 05/11/2021 143  0 - 150 mg/dL Final   • HDL Cholesterol 05/11/2021 33* 40 - 60 mg/dL Final   • LDL Cholesterol  05/11/2021 33  0 - 100 mg/dL Final   • VLDL Cholesterol 05/11/2021 25  5 - 40 mg/dL Final   • LDL/HDL Ratio 05/11/2021 0.89   Final   • Hemoglobin A1C 05/11/2021 6.9* 3.5 - 5.6 % Final   • Glucose 05/11/2021 147* 65 - 99 mg/dL Final   • BUN 05/11/2021 16  8 - 23 mg/dL Final   • Creatinine 05/11/2021 1.06  0.76 - 1.27 mg/dL Final   • Sodium 05/11/2021 139  136 - 145 mmol/L Final   • Potassium 05/11/2021 4.4  3.5 - 5.2 mmol/L Final   • Chloride 05/11/2021 103  98 - 107 mmol/L Final   • CO2 05/11/2021 29.3* 22.0 - 29.0 mmol/L Final   • Calcium 05/11/2021 9.5  8.6 - 10.5 mg/dL Final   • Total Protein 05/11/2021 6.7  6.0 - 8.5 g/dL Final   • Albumin 05/11/2021 4.00  3.50 - 5.20 g/dL Final   • ALT (SGPT) 05/11/2021 18  1 - 41 U/L Final   • AST (SGOT) 05/11/2021 24  1 - 40 U/L Final   • Alkaline Phosphatase 05/11/2021 74  39 - 117 U/L Final   • Total Bilirubin 05/11/2021 1.1  0.0 - 1.2 mg/dL Final   • eGFR Non African Amer 05/11/2021 70  >60 mL/min/1.73 Final   • Globulin 05/11/2021 2.7  gm/dL Final   • A/G Ratio 05/11/2021 1.5  g/dL Final   • BUN/Creatinine Ratio 05/11/2021 15.1  7.0 - 25.0 Final   • Anion Gap 05/11/2021 6.7  5.0 - 15.0 mmol/L Final   • Microalbumin/Creatinine Ratio 05/11/2021    Final     Unable to calculate   • Creatinine, Urine 05/11/2021 67.0  mg/dL Final   • Microalbumin, Urine 05/11/2021 <1.2  mg/dL Final         Assessment/Plan   Diagnoses and all orders for this visit:    1. Hypertension, benign (Primary)    2. Neuropathy    3. Diabetic peripheral neuropathy associated with type 2 diabetes mellitus (CMS/[facility])  -     Ambulatory Referral to Podiatry    Continue current blood pressure medicines at current doses.  Would like a referral down to Dr. Bueno for general diabetic foot care.  Get labs for Dr. Allison as planned in November.  Follow-up here in 6 months to recheck his blood pressure.             Call with any problems or concerns before next visit  Return in about 6 months (around 1/9/2022).      Much of this report is an electronic transcription of spoken language to printed text using Dragon dictation software.  As such, the subtleties and finesse of spoken language may permit erroneous, or at times, nonsensical words or phrases to be inadvertently transcribed; thus changes may be made at a later date to rectify these errors.

## 2021-07-13 ENCOUNTER — OFFICE VISIT (OUTPATIENT)
Dept: PODIATRY | Facility: CLINIC | Age: 66
End: 2021-07-13

## 2021-07-13 VITALS
DIASTOLIC BLOOD PRESSURE: 71 MMHG | HEART RATE: 73 BPM | HEIGHT: 71 IN | BODY MASS INDEX: 42.42 KG/M2 | SYSTOLIC BLOOD PRESSURE: 118 MMHG | WEIGHT: 303 LBS

## 2021-07-13 DIAGNOSIS — E11.42 DM TYPE 2 WITH DIABETIC PERIPHERAL NEUROPATHY (HCC): ICD-10-CM

## 2021-07-13 DIAGNOSIS — M19.071 ARTHRITIS OF BOTH FEET: ICD-10-CM

## 2021-07-13 DIAGNOSIS — M21.6X2 EQUINUS DEFORMITY OF BOTH FEET: ICD-10-CM

## 2021-07-13 DIAGNOSIS — M79.671 BILATERAL FOOT PAIN: Primary | ICD-10-CM

## 2021-07-13 DIAGNOSIS — I87.303 CHRONIC VENOUS HYPERTENSION WITHOUT COMPLICATIONS, BILATERAL: ICD-10-CM

## 2021-07-13 DIAGNOSIS — M79.672 BILATERAL FOOT PAIN: Primary | ICD-10-CM

## 2021-07-13 DIAGNOSIS — E66.01 MORBID OBESITY WITH BMI OF 40.0-44.9, ADULT (HCC): ICD-10-CM

## 2021-07-13 DIAGNOSIS — M19.072 ARTHRITIS OF BOTH FEET: ICD-10-CM

## 2021-07-13 DIAGNOSIS — M21.6X1 EQUINUS DEFORMITY OF BOTH FEET: ICD-10-CM

## 2021-07-13 PROCEDURE — 99203 OFFICE O/P NEW LOW 30 MIN: CPT | Performed by: PODIATRIST

## 2021-07-14 NOTE — PROGRESS NOTES
07/13/2021  Foot and Ankle Surgery - New Patient   Provider: Dr. Stanley Bueno DPM  Location: [facility]    Subjective:  Haider Holland is a 66 y.o. male.     Chief Complaint   Patient presents with   • Annual Exam     Dm check       HPI: Patient is a 66-year-old diabetic male that was referred to me by his primary care physician for routine diabetic foot check and foot pain.  Patient states that he has had progressive issues with his feet over the last few years.  He complains of prominent stiffness and limitation.  Most of his issues are noticed in the morning and after periods of rest.  He states that it is very hard for him to get going.  He also complains about prominent instability and numbness involving his lower extremities.  He states that this is also been progressive.  He has not seen a podiatrist in the past.  He has noticed increasing swelling involving both lower extremities.  He is worried that symptoms will cause limitation as time goes on.  Patient is unaware of any previous open wounds or infections to his feet.    No Known Allergies    Past Medical History:   Diagnosis Date   • Abnormal liver function tests 09/27/2017   • Bilateral lower extremity edema 02/05/2019   • BMI 40.0-44.9, adult (CMS/[facility]) 02/05/2019   • CAD (coronary artery disease) 10/24/2017   • Cellulitis of leg, right 04/02/2019   • Chest pain 10/24/2017   • Cough 02/06/2018   • Diabetes mellitus, type II (CMS/[facility]) 10/24/2017   • Hyperlipidemia 09/27/2017   • Hypertension, benign 09/27/2017   • MI (myocardial infarction) (CMS/[facility]) 2008   • Neuropathy 08/07/2018   • Peripheral neuropathy    • Screening for colon cancer 02/05/2019   • Screening PSA (prostate specific antigen) 02/05/2019   • Skin abscess 09/18/2018   • Sore throat 02/06/2018   • Special screening for malignant neoplasm of prostate 11/30/2017   • Type 2 diabetes, uncontrolled, with neuropathy (CMS/[facility]) 09/27/2017    DM typeII with peripheral neuropathy  uncontrolled   • URI, acute 02/06/2018   • Well adult exam 11/30/2017       Past Surgical History:   Procedure Laterality Date   • CARDIAC CATHETERIZATION  11/27/2017, 2008    with stent in 2017   • CHOLECYSTECTOMY  12/11/2017   • EYE SURGERY  2019   • EYE SURGERY Left 11/19/2020   • OTHER SURGICAL HISTORY      rt arm sx       Family History   Problem Relation Age of Onset   • Diabetes Mother    • Heart disease Mother    • Hypertension Mother    • Heart disease Father    • Diabetes Brother    • Cancer Brother        Social History     Socioeconomic History   • Marital status:      Spouse name: Not on file   • Number of children: Not on file   • Years of education: Not on file   • Highest education level: Not on file   Tobacco Use   • Smoking status: Never Smoker   • Smokeless tobacco: Never Used   Vaping Use   • Vaping Use: Never used   Substance and Sexual Activity   • Alcohol use: No   • Drug use: No   • Sexual activity: Defer        Current Outpatient Medications on File Prior to Visit   Medication Sig Dispense Refill   • ammonium lactate (Lac-Hydrin) 12 % cream Apply  topically to the appropriate area as directed Daily. Both lower legs 385 g 2   • aspirin (ASPIR) 81 MG EC tablet Take 1 tablet by mouth 2 (Two) Times a Day. aspir-81  81MG oral tablet delayed release 180 tablet 1   • atorvastatin (Lipitor) 10 MG tablet Take 1 tablet by mouth Daily. 90 tablet 3   • azelastine (OPTIVAR) 0.05 % ophthalmic solution INSTILL 1 DROP INTO EACH EYE 1 TO 2 TIMES DAILY     • bumetanide (Bumex) 1 MG tablet Take 1 tablet by mouth Daily. 90 tablet 3   • Canagliflozin (Invokana) 100 MG tablet tablet Take 1 tablet by mouth Daily. 90 tablet 3   • gabapentin (NEURONTIN) 600 MG tablet Take 1 tablet by mouth 4 times per day 120 tablet 5   • glimepiride (AMARYL) 2 MG tablet TAKE 1 TABLET BY MOUTH TWICE DAILY *F/U:COLIN WALLS [phone] [address] [address]* 60 tablet 5   • glucose blood test  "strip Check BS twice a day DX:E11.65 300 each 1   • HumaLOG Mix 75/25 KwikPen (75-25) 100 UNIT/ML suspension pen-injector pen INJECT 25 UNITS SUBCUTANEOUSLY INTO THE APPROPRIATE AREA AS DIRECTED TWICE DAILY WITH MEALS 15 mL 7   • lisinopril (PRINIVIL,ZESTRIL) 20 MG tablet Take 1 tablet by mouth Daily. 90 tablet 3   • mupirocin (Bactroban) 2 % ointment Apply  topically to the appropriate area as directed 2 (Two) Times a Day. 30 g 2   • naproxen sodium (ALEVE) 220 MG tablet Take 220 mg by mouth 2 (Two) Times a Day As Needed.     • SITagliptin-metFORMIN HCl ER (Janumet XR)  MG tablet Take 1 tablet by mouth 2 (Two) Times a Day. 45 tablet 0   • TRUEplus Pen Needles 32G X 4 MM misc USE AS DIRECTED WITH INSULIN 100 each 3     No current facility-administered medications on file prior to visit.       Review of Systems:  General: Denies fever, chills, fatigue, and weakness.  Eyes: Denies vision loss, blurry vision, and excessive redness.  ENT: Denies hearing issues and difficulty swallowing.  Cardiovascular: Denies palpitations, chest pain, or syncopal episodes.  Respiratory: Denies shortness of breath, wheezing, and coughing.  GI: Denies abdominal pain, nausea, and vomiting.   : Denies frequency, hematuria, and urgency.  Musculoskeletal:+ Stiffness and discomfort involving his lower extremities and feet  Derm: Denies rash, open wounds, or suspicious lesions.  Neuro: + Numbness and instability involving both lower extremities  Psych: Denies anxiety and depression.  Endocrine: Denies temperature intolerance and changes in appetite.  Heme: Denies bleeding disorders or abnormal bruising.     Objective   /71   Pulse 73   Ht 180.3 cm (70.98\")   Wt (!) 137 kg (303 lb)   BMI 42.28 kg/m²     Foot/Ankle Exam:       General:   Appearance: appears stated age and healthy and obesity    Orientation: AAOx3    Affect: appropriate    Gait: antalgic      VASCULAR      Right Foot Vascularity   Normal vascular exam  "   Dorsalis pedis:  2+  Posterior tibial:  2+  Skin Temperature: warm    Edema Grading:  Pitting and 3+  CFT:  < 3 seconds  Pedal Hair Growth:  Absent     Left Foot Vascularity   Normal vascular exam    Dorsalis pedis:  2+  Posterior tibial:  2+  Skin Temperature: warm    Edema Grading:  3+ and pitting  CFT:  < 3 seconds  Pedal Hair Growth:  Absent      NEUROLOGIC     Right Foot Neurologic   Light touch sensation:  Diminished  Hot/Cold sensation: diminished    Protective Sensation using [place] Monofilament:  Diminished  Achilles reflex:  2+     Left Foot Neurologic   Light touch sensation:  Diminished  Hot/cold sensation: diminished    Protective Sensation using [place] Monofilament:  Diminished  Achilles reflex:  2+     MUSCULOSKELETAL      Right Foot Musculoskeletal    Amputation   Right toes amputated: No    Ecchymosis:  None  Tenderness: lesser metatarsophalangeal joints, posterior heel, arch and dorsal foot    Arch:  Normal  Hammertoe:  Second toe, third toe, fourth toe and fifth toe     Left Foot Musculoskeletal    Amputation   Left toes amputated: No    Ecchymosis:  None  Tenderness: lesser metatarsophalangeal joints, posterior heel, arch and dorsal foot    Arch:  Normal  Hammertoe:  Second toe, third toe, fourth toe and fifth toe     MUSCLE STRENGTH     Right Foot Muscle Strength   Normal strength    Foot dorsiflexion:  5  Foot plantar flexion:  5  Foot inversion:  5  Foot eversion:  5     Left Foot Muscle Strength   Normal strength    Foot dorsiflexion:  5  Foot plantar flexion:  5  Foot inversion:  5  Foot eversion:  5     DERMATOLOGIC     Right Foot Dermatologic   Skin: skin intact       Left Foot Dermatologic   Skin: skin intact       TESTS     Right Foot Tests   Anterior drawer: negative    Varus tilt: negative       Left Foot Tests   Anterior drawer: negative    Varus tilt: negative        Right Foot Additional Comments Prominent soft tissue rigidity to both feet.  Moderate equinus  contracture with knee extended and flexed, bilateral.  Bony prominence noted to the dorsal aspect of the right midfoot most likely consistent with underlying degenerative changes.  No xiomara deformity involving the feet      Assessment/Plan   Diagnoses and all orders for this visit:    1. Bilateral foot pain (Primary)  -     Ambulatory Referral to Physical Therapy Evaluate and treat    2. Arthritis of both feet    3. Equinus deformity of both feet    4. Chronic venous hypertension without complications, bilateral    5. Morbid obesity with BMI of 40.0-44.9, adult (CMS/[facility])    6. DM type 2 with diabetic peripheral neuropathy (CMS/[facility])      Patient presents as referral from his primary care physician for routine diabetic foot check and pain involving both lower extremities.  Patient does have tightness and discomfort involving the lower extremities and feet after periods of rest.  After evaluation, he does have prominent soft tissue rigidity involving both lower extremities with findings consistent with underlying arthritis.  I did review these issues with him at length.  I do feel that findings are complicated by underlying diabetes as well as his obesity.  Patient does have prominent swelling involving both lower extremities.  I have recommended that he wear compression stockings on a daily basis with 20 to 30 mmHg of compression.  We did review proper use and effects.  He does not have any complicating features involving his feet as to open wounds or infections.  I have recommended that we start formal physical therapy for range of motion, stretching, and manual therapy exercises.  We did discuss the importance of weight loss with the patient.  I have also recommended that he obtain a pair of over-the-counter arch supports with metatarsal pad for proper forefoot balancing.  He does have loss of protective sensation with monofilament testing which I do feel is secondary to diabetic neuropathy.  I have asked that he  perform routine daily foot checks and we discussed the importance of proper glycemic control.  I do feel that he is at moderate risk of complications.  I have recommended that he follow-up with me in 6 weeks for reevaluation and imaging of both the feet and ankles.  Greater than 30 minutes was spent before, during, and after evaluation for patient care    Orders Placed This Encounter   Procedures   • Ambulatory Referral to Physical Therapy Evaluate and treat     Referral Priority:   Routine     Referral Type:   Physical Therapy     Referral Reason:   Specialty Services Required     Referral Location:   [facility]     Requested Specialty:   Physical Therapy     Number of Visits Requested:   1        Note is dictated utilizing voice recognition software. Unfortunately this leads to occasional typographical errors. I apologize in advance if the situation occurs. If questions occur please do not hesitate to call our office.

## 2021-08-24 ENCOUNTER — OFFICE VISIT (OUTPATIENT)
Dept: PODIATRY | Facility: CLINIC | Age: 66
End: 2021-08-24

## 2021-08-24 VITALS
WEIGHT: 300.6 LBS | DIASTOLIC BLOOD PRESSURE: 70 MMHG | SYSTOLIC BLOOD PRESSURE: 163 MMHG | HEIGHT: 71 IN | BODY MASS INDEX: 42.08 KG/M2 | RESPIRATION RATE: 20 BRPM | HEART RATE: 79 BPM

## 2021-08-24 DIAGNOSIS — I87.303 CHRONIC VENOUS HYPERTENSION WITHOUT COMPLICATIONS, BILATERAL: ICD-10-CM

## 2021-08-24 DIAGNOSIS — M19.072 ARTHRITIS OF BOTH FEET: ICD-10-CM

## 2021-08-24 DIAGNOSIS — M19.071 ARTHRITIS OF BOTH FEET: ICD-10-CM

## 2021-08-24 DIAGNOSIS — E66.01 MORBID OBESITY WITH BMI OF 40.0-44.9, ADULT (HCC): ICD-10-CM

## 2021-08-24 DIAGNOSIS — M21.6X2 EQUINUS DEFORMITY OF BOTH FEET: ICD-10-CM

## 2021-08-24 DIAGNOSIS — M21.6X1 EQUINUS DEFORMITY OF BOTH FEET: ICD-10-CM

## 2021-08-24 DIAGNOSIS — M79.671 BILATERAL FOOT PAIN: Primary | ICD-10-CM

## 2021-08-24 DIAGNOSIS — E11.42 DM TYPE 2 WITH DIABETIC PERIPHERAL NEUROPATHY (HCC): ICD-10-CM

## 2021-08-24 DIAGNOSIS — M79.672 BILATERAL FOOT PAIN: Primary | ICD-10-CM

## 2021-08-24 PROCEDURE — 99213 OFFICE O/P EST LOW 20 MIN: CPT | Performed by: PODIATRIST

## 2021-08-25 NOTE — PROGRESS NOTES
08/24/2021  Foot and Ankle Surgery - Established Patient/Follow-up  Provider: Dr. Stanley Bueno DPM  Location: [facility]    Subjective:  Haider Holland is a 66 y.o. male.     Chief Complaint   Patient presents with   • Left Ankle - Follow-up, Pain   • Right Ankle - Follow-up, Pain   • Left Foot - Follow-up, Pain   • Right Foot - Follow-up, Pain       HPI: Patient returns for follow-up regarding bilateral lower extremity issues.  He states that he has noticed substantial improvement with the over-the-counter arch supports and physical therapy.  He is noticing continued improvements on a daily basis.  He denies any new issues or concerns.  He feels that his overall health and glycemic control are doing quite well.    No Known Allergies    Current Outpatient Medications on File Prior to Visit   Medication Sig Dispense Refill   • ammonium lactate (Lac-Hydrin) 12 % cream Apply  topically to the appropriate area as directed Daily. Both lower legs 385 g 2   • aspirin (ASPIR) 81 MG EC tablet Take 1 tablet by mouth 2 (Two) Times a Day. aspir-81  81MG oral tablet delayed release 180 tablet 1   • atorvastatin (Lipitor) 10 MG tablet Take 1 tablet by mouth Daily. 90 tablet 3   • azelastine (OPTIVAR) 0.05 % ophthalmic solution INSTILL 1 DROP INTO EACH EYE 1 TO 2 TIMES DAILY     • bumetanide (Bumex) 1 MG tablet Take 1 tablet by mouth Daily. 90 tablet 3   • Canagliflozin (Invokana) 100 MG tablet tablet Take 1 tablet by mouth Daily. 90 tablet 3   • gabapentin (NEURONTIN) 600 MG tablet Take 1 tablet by mouth 4 times per day 120 tablet 5   • glimepiride (AMARYL) 2 MG tablet TAKE 1 TABLET BY MOUTH TWICE DAILY *F/U:COLIN SYMAN [phone] [address] [address]* 60 tablet 5   • glucose blood test strip Check BS twice a day DX:E11.65 300 each 1   • HumaLOG Mix 75/25 KwikPen (75-25) 100 UNIT/ML suspension pen-injector pen INJECT 25 UNITS SUBCUTANEOUSLY INTO THE APPROPRIATE AREA AS DIRECTED TWICE  "DAILY WITH MEALS 15 mL 7   • lisinopril (PRINIVIL,ZESTRIL) 20 MG tablet Take 1 tablet by mouth Daily. 90 tablet 3   • mupirocin (Bactroban) 2 % ointment Apply  topically to the appropriate area as directed 2 (Two) Times a Day. 30 g 2   • naproxen sodium (ALEVE) 220 MG tablet Take 220 mg by mouth 2 (Two) Times a Day As Needed.     • SITagliptin-metFORMIN HCl ER (Janumet XR)  MG tablet Take 1 tablet by mouth 2 (Two) Times a Day. 45 tablet 0   • TRUEplus Pen Needles 32G X 4 MM misc USE AS DIRECTED WITH INSULIN 100 each 3     No current facility-administered medications on file prior to visit.       Objective   /70 (BP Location: Left arm, Patient Position: Sitting, Cuff Size: Large Adult)   Pulse 79   Resp 20   Ht 180.3 cm (71\")   Wt (!) 136 kg (300 lb 9.6 oz)   BMI 41.93 kg/m²      General:   Appearance: appears stated age and healthy and obesity    Orientation: AAOx3    Affect: appropriate    Gait: antalgic       VASCULAR       Right Foot Vascularity   Normal vascular exam    Dorsalis pedis:  2+  Posterior tibial:  2+  Skin Temperature: warm    Edema Grading:  Pitting and 3+  CFT:  < 3 seconds  Pedal Hair Growth:  Absent      Left Foot Vascularity   Normal vascular exam    Dorsalis pedis:  2+  Posterior tibial:  2+  Skin Temperature: warm    Edema Grading:  3+ and pitting  CFT:  < 3 seconds  Pedal Hair Growth:  Absent      NEUROLOGIC      Right Foot Neurologic   Light touch sensation:  Diminished  Hot/Cold sensation: diminished    Protective Sensation using [place] Monofilament:  Diminished  Achilles reflex:  2+      Left Foot Neurologic   Light touch sensation:  Diminished  Hot/cold sensation: diminished    Protective Sensation using [place] Monofilament:  Diminished  Achilles reflex:  2+      MUSCULOSKELETAL       Right Foot Musculoskeletal    Amputation   Right toes amputated: No    Ecchymosis:  None  Tenderness: lesser metatarsophalangeal joints, posterior heel, arch and dorsal " foot    Arch:  Normal  Hammertoe:  Second toe, third toe, fourth toe and fifth toe      Left Foot Musculoskeletal    Amputation   Left toes amputated: No    Ecchymosis:  None  Tenderness: lesser metatarsophalangeal joints, posterior heel, arch and dorsal foot    Arch:  Normal  Hammertoe:  Second toe, third toe, fourth toe and fifth toe      MUSCLE STRENGTH      Right Foot Muscle Strength   Normal strength    Foot dorsiflexion:  5  Foot plantar flexion:  5  Foot inversion:  5  Foot eversion:  5      Left Foot Muscle Strength   Normal strength    Foot dorsiflexion:  5  Foot plantar flexion:  5  Foot inversion:  5  Foot eversion:  5      DERMATOLOGIC      Right Foot Dermatologic   Skin: skin intact        Left Foot Dermatologic   Skin: skin intact        TESTS      Right Foot Tests   Anterior drawer: negative    Varus tilt: negative        Left Foot Tests   Anterior drawer: negative    Varus tilt: negative      Assessment/Plan   Diagnoses and all orders for this visit:    1. Bilateral foot pain (Primary)  -     XR Foot 3+ View Bilateral    2. Arthritis of both feet  -     XR Ankle 3+ View Bilateral    3. Equinus deformity of both feet    4. Chronic venous hypertension without complications, bilateral    5. Morbid obesity with BMI of 40.0-44.9, adult (CMS/[facility])    6. DM type 2 with diabetic peripheral neuropathy (CMS/[facility])      Patient returns with physical exam relatively unchanged and stable.  He continues to have bony prominences involving various aspects of both feet.  Imaging was obtained of the foot and ankle today and independently reviewed.  Findings are consistent with significant degenerative changes involving various aspects of both feet.  No fractures or dislocations are identified.  I did discuss the findings and further options with the patient.  Given he has improved with the physical therapy, I do feel that we should proceed with treatment.  Patient understands and agrees.  I would like him to continue  wearing the inserts on a daily basis he is to monitor closely and call with any progressive issues or concerns.  I will see him in 6 months for routine foot check.  Greater than 20 minutes was spent before, during, and after evaluation for patient care    Orders Placed This Encounter   Procedures   • XR Ankle 3+ View Bilateral     Scheduling Instructions:      RM 14 WB     Order Specific Question:   Reason for Exam:     Answer:   BILATERAL ANKLE PAIN     Order Specific Question:   Does this patient have a diabetic monitoring/medication delivering device on?     Answer:   No     Order Specific Question:   Release to patient     Answer:   Immediate   • XR Foot 3+ View Bilateral     Scheduling Instructions:      RM 14 WB     Order Specific Question:   Reason for Exam:     Answer:   EVE FOOT PAIN     Order Specific Question:   Does this patient have a diabetic monitoring/medication delivering device on?     Answer:   No     Order Specific Question:   Release to patient     Answer:   Immediate          Note is dictated utilizing voice recognition software. Unfortunately this leads to occasional typographical errors. I apologize in advance if the situation occurs. If questions occur please do not hesitate to call our office.

## 2021-09-07 RX ORDER — INSULIN LISPRO 100 [IU]/ML
INJECTION, SUSPENSION SUBCUTANEOUS
Qty: 15 ML | Refills: 10 | Status: SHIPPED | OUTPATIENT
Start: 2021-09-07 | End: 2021-12-06 | Stop reason: SDUPTHER

## 2021-10-04 RX ORDER — PEN NEEDLE, DIABETIC 32GX 5/32"
NEEDLE, DISPOSABLE MISCELLANEOUS
Qty: 100 EACH | Refills: 3 | Status: SHIPPED | OUTPATIENT
Start: 2021-10-04 | End: 2021-12-06 | Stop reason: SDUPTHER

## 2021-11-10 LAB
ALBUMIN SERPL-MCNC: 3.8 G/DL (ref 3.8–4.8)
ALBUMIN/CREAT UR: 23 MG/G CREAT (ref 0–29)
ALBUMIN/GLOB SERPL: 1.4 {RATIO} (ref 1.2–2.2)
ALP SERPL-CCNC: 72 IU/L (ref 44–121)
ALT SERPL-CCNC: 28 IU/L (ref 0–44)
AST SERPL-CCNC: 29 IU/L (ref 0–40)
BILIRUB SERPL-MCNC: 1.3 MG/DL (ref 0–1.2)
BUN SERPL-MCNC: 17 MG/DL (ref 8–27)
BUN/CREAT SERPL: 17 (ref 10–24)
CALCIUM SERPL-MCNC: 9 MG/DL (ref 8.6–10.2)
CHLORIDE SERPL-SCNC: 106 MMOL/L (ref 96–106)
CHOLEST SERPL-MCNC: 98 MG/DL (ref 100–199)
CO2 SERPL-SCNC: 24 MMOL/L (ref 20–29)
CREAT SERPL-MCNC: 1.01 MG/DL (ref 0.76–1.27)
CREAT UR-MCNC: 42.7 MG/DL
GLOBULIN SER CALC-MCNC: 2.7 G/DL (ref 1.5–4.5)
GLUCOSE SERPL-MCNC: 139 MG/DL (ref 65–99)
HBA1C MFR BLD: 6.3 % (ref 4.8–5.6)
HDLC SERPL-MCNC: 32 MG/DL
LDLC SERPL CALC-MCNC: 42 MG/DL (ref 0–99)
MICROALBUMIN UR-MCNC: 9.9 UG/ML
POTASSIUM SERPL-SCNC: 4.5 MMOL/L (ref 3.5–5.2)
PROT SERPL-MCNC: 6.5 G/DL (ref 6–8.5)
SODIUM SERPL-SCNC: 143 MMOL/L (ref 134–144)
TRIGL SERPL-MCNC: 133 MG/DL (ref 0–149)
VLDLC SERPL CALC-MCNC: 24 MG/DL (ref 5–40)

## 2021-11-16 ENCOUNTER — OFFICE VISIT (OUTPATIENT)
Dept: ENDOCRINOLOGY | Facility: CLINIC | Age: 66
End: 2021-11-16

## 2021-11-16 VITALS
BODY MASS INDEX: 41.3 KG/M2 | WEIGHT: 295 LBS | HEIGHT: 71 IN | OXYGEN SATURATION: 96 % | TEMPERATURE: 97.8 F | SYSTOLIC BLOOD PRESSURE: 120 MMHG | DIASTOLIC BLOOD PRESSURE: 72 MMHG | HEART RATE: 81 BPM

## 2021-11-16 DIAGNOSIS — E78.2 MIXED HYPERLIPIDEMIA: ICD-10-CM

## 2021-11-16 DIAGNOSIS — Z79.4 TYPE 2 DIABETES MELLITUS WITH HYPERGLYCEMIA, WITH LONG-TERM CURRENT USE OF INSULIN (HCC): Primary | ICD-10-CM

## 2021-11-16 DIAGNOSIS — E11.65 TYPE 2 DIABETES MELLITUS WITH HYPERGLYCEMIA, WITH LONG-TERM CURRENT USE OF INSULIN (HCC): Primary | ICD-10-CM

## 2021-11-16 DIAGNOSIS — I10 HYPERTENSION, BENIGN: ICD-10-CM

## 2021-11-16 DIAGNOSIS — E11.42 DIABETIC PERIPHERAL NEUROPATHY ASSOCIATED WITH TYPE 2 DIABETES MELLITUS (HCC): ICD-10-CM

## 2021-11-16 LAB — GLUCOSE BLDC GLUCOMTR-MCNC: 111 MG/DL (ref 70–105)

## 2021-11-16 PROCEDURE — 82962 GLUCOSE BLOOD TEST: CPT | Performed by: INTERNAL MEDICINE

## 2021-11-16 PROCEDURE — 99214 OFFICE O/P EST MOD 30 MIN: CPT | Performed by: INTERNAL MEDICINE

## 2021-11-16 NOTE — PATIENT INSTRUCTIONS
Continue current management  Continue to work on your diet and activity  Always keep glucose source in case of low blood sugar  Annual eye exam and flu vaccine  Labs before follow-up.

## 2021-11-16 NOTE — PROGRESS NOTES
Endocrine Progress Note Outpatient     Patient Care Team:  Cuca Aquino MD as PCP - General (Family Medicine)    Chief Complaint: Follow up type 2 diabetes    HPI: 66-year-old male with history of type 2 diabetes, hypertension, hyperlipidemia and obesity is here for follow-up.    For type 2 diabetes he is currently on Janumet XR 50/thousand, 2 tablets p.o. daily, glimepiride 2 mg twice a day, Invokana 100 medium daily and Humalog mix 75/25, 20 units twice a day.  He did bring in blood sugar records for review and they are running mostly between 100-160.  He does not have low blood sugars.  Is trying his best for his diet.  He has not gotten flu or pneumonia vaccine.      Hypertension: Well-controlled.    Hyperlipidemia: On atorvastatin.    Obesity: He is advised to continue to work on his diet and activity.    Diabetic peripheral neuropathy: Stable.     Past Medical History:   Diagnosis Date   • Abnormal liver function tests 09/27/2017   • Bilateral lower extremity edema 02/05/2019   • BMI 40.0-44.9, adult (Formerly [facility]) 02/05/2019   • CAD (coronary artery disease) 10/24/2017   • Cellulitis of leg, right 04/02/2019   • Chest pain 10/24/2017   • Cough 02/06/2018   • Diabetes mellitus, type II (Formerly [facility]) 10/24/2017   • Hyperlipidemia 09/27/2017   • Hypertension, benign 09/27/2017   • MI (myocardial infarction) (Formerly [facility]) 2008   • Neuropathy 08/07/2018   • Peripheral neuropathy    • Screening for colon cancer 02/05/2019   • Screening PSA (prostate specific antigen) 02/05/2019   • Skin abscess 09/18/2018   • Sore throat 02/06/2018   • Special screening for malignant neoplasm of prostate 11/30/2017   • Type 2 diabetes, uncontrolled, with neuropathy ([facility]) 09/27/2017    DM typeII with peripheral neuropathy uncontrolled   • URI, acute 02/06/2018   • Well adult exam 11/30/2017       Social History     Socioeconomic History   • Marital status:    Tobacco Use   • Smoking status: Never Smoker   • Smokeless tobacco: Never Used    Vaping Use   • Vaping Use: Never used   Substance and Sexual Activity   • Alcohol use: No   • Drug use: No   • Sexual activity: Defer       Family History   Problem Relation Age of Onset   • Diabetes Mother    • Heart disease Mother    • Hypertension Mother    • Heart disease Father    • Diabetes Brother    • Cancer Brother        No Known Allergies    ROS:   Constitutional:  Denies fatigue, tiredness.    Eyes:  Denies change in visual acuity   HENT:  Denies nasal congestion or sore throat   Respiratory: denies cough, shortness of breath.   Cardiovascular:  denies chest pain, edema   GI:  Denies abdominal pain, nausea, vomiting.   Musculoskeletal:  Denies back pain or joint pain   Integument:  Denies dry skin and rash   Neurologic:  Denies headache, focal weakness or sensory changes   Endocrine:  Denies polyuria or polydipsia   Psychiatric:  Denies depression or anxiety      Vitals:    11/16/21 1510   BP: 120/72   Pulse: 81   Temp: 97.8 °F (36.6 °C)   SpO2: 96%     BMI: 41.2  Physical Exam:  GEN: NAD, conversant, obese  EYES: EOMI, PERRL, no conjunctival erythema  NECK: no thyromegaly, full ROM   CV: RRR, no murmurs/rubs/gallops, no peripheral edema  LUNG: CTAB, no wheezes/rales/ronchi  SKIN: no rashes, no acanthosis  MSK: no deformities, full ROM of all extremities  NEURO: no tremors, DTR normal  PSYCH: AOX3, appropriate mood, affect normal  Feet: Has calluses on both feet, no ulcer      Results Review:     I reviewed the patient's new clinical results.    Lab Results   Component Value Date    HGBA1C 6.3 (H) 11/09/2021    HGBA1C 6.9 (H) 05/11/2021    HGBA1C 6.5 11/13/2020      Lab Results   Component Value Date    GLUCOSE 139 (H) 11/09/2021    BUN 17 11/09/2021    CREATININE 1.01 11/09/2021    EGFRIFNONA 77 11/09/2021    EGFRIFAFRI 89 11/09/2021    BCR 17 11/09/2021    K 4.5 11/09/2021    CO2 24 11/09/2021    CALCIUM 9.0 11/09/2021    PROTENTOTREF 6.5 11/09/2021    ALBUMIN 3.8 11/09/2021    LABIL2 1.4 11/09/2021     AST 29 11/09/2021    ALT 28 11/09/2021    CHOL 91 05/11/2021    TRIG 133 11/09/2021    LDL 42 11/09/2021    HDL 32 (L) 11/09/2021     Lab Results   Component Value Date    TSH 3.51 12/01/2017         Medication Review: Reviewed.       Current Outpatient Medications:   •  ammonium lactate (Lac-Hydrin) 12 % cream, Apply  topically to the appropriate area as directed Daily. Both lower legs, Disp: 385 g, Rfl: 2  •  aspirin (ASPIR) 81 MG EC tablet, Take 1 tablet by mouth 2 (Two) Times a Day. aspir-81  81MG oral tablet delayed release, Disp: 180 tablet, Rfl: 1  •  atorvastatin (Lipitor) 10 MG tablet, Take 1 tablet by mouth Daily., Disp: 90 tablet, Rfl: 3  •  azelastine (OPTIVAR) 0.05 % ophthalmic solution, INSTILL 1 DROP INTO EACH EYE 1 TO 2 TIMES DAILY, Disp: , Rfl:   •  BD Pen Needle Supriya U/F 32G X 4 MM misc, USE AS DIRECTED WITH INSULIN, Disp: 100 each, Rfl: 3  •  bumetanide (Bumex) 1 MG tablet, Take 1 tablet by mouth Daily., Disp: 90 tablet, Rfl: 3  •  Canagliflozin (Invokana) 100 MG tablet tablet, Take 1 tablet by mouth Daily., Disp: 90 tablet, Rfl: 3  •  gabapentin (NEURONTIN) 600 MG tablet, Take 1 tablet by mouth 4 times per day, Disp: 120 tablet, Rfl: 5  •  glimepiride (AMARYL) 2 MG tablet, TAKE 1 TABLET BY MOUTH TWICE DAILY *F/U:[facility] [phone] [address] [address]*, Disp: 60 tablet, Rfl: 5  •  glucose blood test strip, Check BS twice a day DX:E11.65, Disp: 300 each, Rfl: 1  •  Insulin Lispro Prot & Lispro (humaLOG 75-25) (75-25) 100 UNIT/ML suspension pen-injector pen, INJECT 25 UNITS SUBCUTANEOUSLY INTO THE APPROPRIATE AREA AS DIRECTED TWICE DAILY WITH MEALS, Disp: 15 mL, Rfl: 10  •  lisinopril (PRINIVIL,ZESTRIL) 20 MG tablet, Take 1 tablet by mouth Daily., Disp: 90 tablet, Rfl: 3  •  mupirocin (Bactroban) 2 % ointment, Apply  topically to the appropriate area as directed 2 (Two) Times a Day., Disp: 30 g, Rfl: 2  •  naproxen sodium (ALEVE) 220 MG tablet, Take 220 mg by  mouth 2 (Two) Times a Day As Needed., Disp: , Rfl:   •  SITagliptin-metFORMIN HCl ER (Janumet XR)  MG tablet, Take 1 tablet by mouth 2 (Two) Times a Day., Disp: 45 tablet, Rfl: 0      Assessment/Plan   1.  Diabetes mellitus type 2: Excellent control with A1c of 6.3%.  I have reviewed his blood sugar records.  At this time I will continue his current regimen Janumet, Invokana, glimepiride and Humalog mix 75/25 insulin.  And follow blood sugars and A1c.  I advised him to get annual eye exam and flu vaccine however he does not want to get a flu shot but he is interested in getting pneumonia vaccine today.  Advised to always keep glucose source with him in case of low blood sugar.    2.  Hypertension: Much better, continue lisinopril..    3.  Hyperlipidemia: Well-controlled, on  atorvastatin 10 mg p.o. daily.    4.  Obesity: Advised to continue work on diet and activity and try to lose some weight.    5.  Diabetic peripheral neuropathy: Stable.            Tono Allison MD FACE.    Much of the above report is an electronic transcription/translation of the spoken language to printed text using Dragon Software. As such, the subtleties and finesse of the spoken language may permit erroneous, or at times, nonsensical words or phrases to be inadvertently transcribed; thus changes may be made at a later date to rectify these errors.

## 2021-12-06 RX ORDER — PEN NEEDLE, DIABETIC 32GX 5/32"
NEEDLE, DISPOSABLE MISCELLANEOUS
Qty: 100 EACH | Refills: 3 | Status: SHIPPED | OUTPATIENT
Start: 2021-12-06

## 2021-12-06 RX ORDER — ATORVASTATIN CALCIUM 10 MG/1
10 TABLET, FILM COATED ORAL DAILY
Qty: 90 TABLET | Refills: 3 | Status: SHIPPED | OUTPATIENT
Start: 2021-12-06 | End: 2022-12-06

## 2021-12-06 RX ORDER — CANAGLIFLOZIN 100 MG/1
1 TABLET, FILM COATED ORAL DAILY
Qty: 90 TABLET | Refills: 3 | Status: SHIPPED | OUTPATIENT
Start: 2021-12-06 | End: 2023-01-16

## 2021-12-06 RX ORDER — GLIMEPIRIDE 2 MG/1
2 TABLET ORAL 2 TIMES DAILY
Qty: 180 TABLET | Refills: 3 | Status: SHIPPED | OUTPATIENT
Start: 2021-12-06 | End: 2023-01-25 | Stop reason: SDUPTHER

## 2021-12-06 RX ORDER — INSULIN LISPRO 100 [IU]/ML
25 INJECTION, SUSPENSION SUBCUTANEOUS 2 TIMES DAILY WITH MEALS
Qty: 15 ML | Refills: 10 | Status: SHIPPED | OUTPATIENT
Start: 2021-12-06 | End: 2023-01-05 | Stop reason: SDUPTHER

## 2021-12-10 ENCOUNTER — OFFICE VISIT (OUTPATIENT)
Dept: FAMILY MEDICINE CLINIC | Facility: CLINIC | Age: 66
End: 2021-12-10

## 2021-12-10 VITALS
BODY MASS INDEX: 39.76 KG/M2 | DIASTOLIC BLOOD PRESSURE: 60 MMHG | WEIGHT: 284 LBS | SYSTOLIC BLOOD PRESSURE: 108 MMHG | OXYGEN SATURATION: 98 % | HEIGHT: 71 IN | TEMPERATURE: 97.7 F | HEART RATE: 74 BPM

## 2021-12-10 DIAGNOSIS — Z00.01 ENCOUNTER FOR ROUTINE ADULT MEDICAL EXAM WITH ABNORMAL FINDINGS: Primary | ICD-10-CM

## 2021-12-10 PROCEDURE — 1170F FXNL STATUS ASSESSED: CPT | Performed by: FAMILY MEDICINE

## 2021-12-10 PROCEDURE — G0439 PPPS, SUBSEQ VISIT: HCPCS | Performed by: FAMILY MEDICINE

## 2021-12-10 PROCEDURE — 1160F RVW MEDS BY RX/DR IN RCRD: CPT | Performed by: FAMILY MEDICINE

## 2021-12-10 PROCEDURE — 1125F AMNT PAIN NOTED PAIN PRSNT: CPT | Performed by: FAMILY MEDICINE

## 2021-12-10 NOTE — PROGRESS NOTES
The ABCs of the Annual Wellness Visit  Subsequent Medicare Wellness Visit    Chief Complaint   Patient presents with   • Medicare Wellness-subsequent      Subjective    History of Present Illness:  Haider Holland is a 66 y.o. male who presents for a Subsequent Medicare Wellness Visit.        The following portions of the patient's history were reviewed and   updated as appropriate: allergies, current medications, past family history, past medical history, past social history, past surgical history and problem list.    Compared to one year ago, the patient feels his physical   health is better.  Feet , legs are better since he had PT for feet.    Compared to one year ago, the patient feels his mental   health is better.    Recent Hospitalizations:  He was not admitted to the hospital during the last year.       Current Medical Providers:  Patient Care Team:  Cuca Aquino MD as PCP - General (Family Medicine)    Outpatient Medications Prior to Visit   Medication Sig Dispense Refill   • ammonium lactate (Lac-Hydrin) 12 % cream Apply  topically to the appropriate area as directed Daily. Both lower legs 385 g 2   • aspirin (ASPIR) 81 MG EC tablet Take 1 tablet by mouth 2 (Two) Times a Day. aspir-81  81MG oral tablet delayed release 180 tablet 1   • atorvastatin (Lipitor) 10 MG tablet Take 1 tablet by mouth Daily. 90 tablet 3   • azelastine (OPTIVAR) 0.05 % ophthalmic solution INSTILL 1 DROP INTO EACH EYE 1 TO 2 TIMES DAILY     • bumetanide (Bumex) 1 MG tablet Take 1 tablet by mouth Daily. 90 tablet 3   • Canagliflozin (Invokana) 100 MG tablet tablet Take 1 tablet by mouth Daily. 90 tablet 3   • gabapentin (NEURONTIN) 600 MG tablet Take 1 tablet by mouth 4 times per day 120 tablet 5   • glimepiride (AMARYL) 2 MG tablet Take 1 tablet by mouth 2 (Two) Times a Day. 180 tablet 3   • glucose blood test strip Check BS twice a day DX:E11.65 300 each 1   • Insulin Lispro Prot & Lispro (humaLOG 75-25) (75-25) 100 UNIT/ML  suspension pen-injector pen Inject 25 Units under the skin into the appropriate area as directed 2 (Two) Times a Day With Meals. 15 mL 10   • Insulin Pen Needle (BD Pen Needle Supriya U/F) 32G X 4 MM misc Use as directed to inject insulin twice daily 100 each 3   • lisinopril (PRINIVIL,ZESTRIL) 20 MG tablet Take 1 tablet by mouth Daily. 90 tablet 3   • mupirocin (Bactroban) 2 % ointment Apply  topically to the appropriate area as directed 2 (Two) Times a Day. 30 g 2   • naproxen sodium (ALEVE) 220 MG tablet Take 220 mg by mouth 2 (Two) Times a Day As Needed.     • SITagliptin-metFORMIN HCl ER (Janumet XR)  MG tablet Take 1 tablet by mouth 2 (Two) Times a Day. 45 tablet 0     No facility-administered medications prior to visit.       No opioid medication identified on active medication list. I have reviewed chart for other potential  high risk medication/s and harmful drug interactions in the elderly.          Aspirin is on active medication list. Aspirin use is indicated based on review of current medical condition/s. Pros and cons of this therapy have been discussed today. Benefits of this medication outweigh potential harm.  Patient has been encouraged to continue taking this medication.  .      Patient Active Problem List   Diagnosis   • Abnormal liver function tests   • Body mass index (BMI) of 40.0-44.9 in adult ([facility])   • Coronary artery disease   • Diabetic peripheral neuropathy associated with type 2 diabetes mellitus ([facility])   • Edema of lower extremity   • Mixed hyperlipidemia   • Hypertension, benign   • Neuropathy   • Type 2 diabetes mellitus with hyperglycemia, with long-term current use of insulin ([facility])   • PAD (peripheral artery disease) ([facility])   • History of colon polyps   • Mild nonproliferative diabetic retinopathy without macular edema associated with type 2 diabetes mellitus (HCC)   • 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  ACP discussion was held with the patient during this visit. Patient does not have an advance directive, information provided.          Objective    Vitals:    12/10/21 1128   BP: 108/60   BP Location: Left arm   Patient Position: Sitting   Cuff Size: Large Adult   Pulse: 74   Temp: 97.7 °F (36.5 °C)   TempSrc: Oral   SpO2: 98%   Weight: 129 kg (284 lb)   Height: 180.3 cm (70.98\")     BMI Readings from Last 1 Encounters:   12/10/21 39.63 kg/m²   BMI is above normal parameters. Recommendations include: exercise counseling and nutrition counseling    Does the patient have evidence of cognitive impairment? No    Physical Exam  Constitutional:       Appearance: He is well-developed.      Comments: Wearing a face mask     HENT:      Head: Normocephalic and atraumatic.   Eyes:      Conjunctiva/sclera: Conjunctivae normal.   Cardiovascular:      Rate and Rhythm: Normal rate.      Heart sounds: Murmur heard.       Pulmonary:      Effort: Pulmonary effort is normal.   Musculoskeletal:         General: Normal range of motion.      Cervical back: Normal range of motion.   Skin:     General: Skin is warm and dry.      Findings: No rash.   Neurological:      Mental Status: He is alert and oriented to person, place, and time.   Psychiatric:         Behavior: Behavior normal.       Lab Results   Component Value Date    CHLPL 98 (L) 11/09/2021    TRIG 133 11/09/2021    HDL 32 (L) 11/09/2021    LDL 42 11/09/2021    VLDL 24 11/09/2021    HGBA1C 6.3 (H) 11/09/2021            HEALTH RISK ASSESSMENT    Smoking Status:  Social History     Tobacco Use   Smoking Status Never Smoker   Smokeless Tobacco Never Used     Alcohol Consumption:  Social History     Substance and Sexual Activity   Alcohol Use No     Fall Risk Screen:    STEADI Fall Risk Assessment was completed, and patient is at LOW risk for falls.Assessment completed " on:7/9/2021    Depression Screening:  PHQ-2/PHQ-9 Depression Screening 12/10/2021   Little interest or pleasure in doing things 0   Feeling down, depressed, or hopeless 0   Trouble falling or staying asleep, or sleeping too much 0   Feeling tired or having little energy 0   Poor appetite or overeating 0   Feeling bad about yourself - or that you are a failure or have let yourself or your family down 0   Trouble concentrating on things, such as reading the newspaper or watching television 0   Moving or speaking so slowly that other people could have noticed. Or the opposite - being so fidgety or restless that you have been moving around a lot more than usual 0   Thoughts that you would be better off dead, or of hurting yourself in some way 0   Total Score 0       Health Habits and Functional and Cognitive Screening:  Functional & Cognitive Status 12/10/2021   Do you have difficulty preparing food and eating? No   Do you have difficulty bathing yourself, getting dressed or grooming yourself? No   Do you have difficulty using the toilet? No   Do you have difficulty moving around from place to place? No   Do you have trouble with steps or getting out of a bed or a chair? No   Current Diet Well Balanced Diet   Dental Exam Not up to date   Eye Exam Unknown   Exercise (times per week) 4 times per week   Current Exercises Include House Cleaning   Do you need help using the phone?  No   Are you deaf or do you have serious difficulty hearing?  No   Do you need help with transportation? No   Do you need help shopping? No   Do you need help preparing meals?  No   Do you need help with housework?  No   Do you need help with laundry? No   Do you need help taking your medications? No   Do you need help managing money? No   Do you ever drive or ride in a car without wearing a seat belt? Yes   Have you felt unusual stress, anger or loneliness in the last month? No   Who do you live with? Spouse   If you need help, do you have  trouble finding someone available to you? No   Have you been bothered in the last four weeks by sexual problems? No   Do you have difficulty concentrating, remembering or making decisions? No       Age-appropriate Screening Schedule:  Refer to the list below for future screening recommendations based on patient's age, sex and/or medical conditions. Orders for these recommended tests are listed in the plan section. The patient has been provided with a written plan.    Health Maintenance   Topic Date Due   • TDAP/TD VACCINES (1 - Tdap) Never done   • ZOSTER VACCINE (1 of 2) Never done   • INFLUENZA VACCINE  12/10/2022 (Originally 8/1/2021)   • DIABETIC EYE EXAM  04/20/2022   • HEMOGLOBIN A1C  05/09/2022   • DIABETIC FOOT EXAM  09/01/2022   • LIPID PANEL  11/09/2022   • URINE MICROALBUMIN  11/09/2022              Assessment/Plan   CMS Preventative Services Quick Reference  Risk Factors Identified During Encounter  Cardiovascular Disease  Chronic Pain   Dementia/Memory   Depression/Dysphoria  Drug Use/Abuse Identified or Suspected  Fall Risk-High or Moderate  Hearing Problem  Immunizations Discussed/Encouraged (specific Immunizations; Influenza, Pneumococcal 23, Shingrix and COVID19  Inactivity/Sedentary  Obesity/Overweight   Polypharmacy  The above risks/problems have been discussed with the patient.  Follow up actions/plans if indicated are seen below in the Assessment/Plan Section.  Pertinent information has been shared with the patient in the After Visit Summary.    Diagnoses and all orders for this visit:    1. Encounter for routine adult medical exam with abnormal findings (Primary)    He had a heart murmur on exam.  No symptoms of heart failure.  He has had his Covid vaccine.  He refused a flu shot.  We talked about a pneumonia vaccine.  I think he is a candidate for this but he wants to wait.  He is up-to-date on colorectal cancer screening.  Cholesterol screening is done through his endocrinologist.      Follow  Up:   Return in about 6 months (around 6/10/2022) for cancel appt in January.     An After Visit Summary and PPPS were made available to the patient.

## 2021-12-16 RX ORDER — SITAGLIPTIN AND METFORMIN HYDROCHLORIDE 1000; 50 MG/1; MG/1
1 TABLET, FILM COATED, EXTENDED RELEASE ORAL 2 TIMES DAILY
Qty: 180 TABLET | Refills: 0 | COMMUNITY
Start: 2021-12-16 | End: 2021-12-16 | Stop reason: SDUPTHER

## 2022-01-25 DIAGNOSIS — E11.65 UNCONTROLLED TYPE 2 DIABETES MELLITUS WITH HYPERGLYCEMIA: ICD-10-CM

## 2022-01-25 RX ORDER — BLOOD-GLUCOSE METER
KIT MISCELLANEOUS
Qty: 300 EACH | Refills: 0 | Status: SHIPPED | OUTPATIENT
Start: 2022-01-25 | End: 2022-06-10 | Stop reason: SDUPTHER

## 2022-02-22 ENCOUNTER — OFFICE VISIT (OUTPATIENT)
Dept: PODIATRY | Facility: CLINIC | Age: 67
End: 2022-02-22

## 2022-02-22 VITALS
SYSTOLIC BLOOD PRESSURE: 125 MMHG | HEART RATE: 83 BPM | BODY MASS INDEX: 39.76 KG/M2 | DIASTOLIC BLOOD PRESSURE: 70 MMHG | HEIGHT: 71 IN | WEIGHT: 284 LBS

## 2022-02-22 DIAGNOSIS — M19.071 ARTHRITIS OF BOTH FEET: ICD-10-CM

## 2022-02-22 DIAGNOSIS — M19.072 ARTHRITIS OF BOTH FEET: ICD-10-CM

## 2022-02-22 DIAGNOSIS — M79.671 BILATERAL FOOT PAIN: Primary | ICD-10-CM

## 2022-02-22 DIAGNOSIS — M21.6X1 EQUINUS DEFORMITY OF BOTH FEET: ICD-10-CM

## 2022-02-22 DIAGNOSIS — M79.672 BILATERAL FOOT PAIN: Primary | ICD-10-CM

## 2022-02-22 DIAGNOSIS — I87.303 CHRONIC VENOUS HYPERTENSION WITHOUT COMPLICATIONS, BILATERAL: ICD-10-CM

## 2022-02-22 DIAGNOSIS — M21.6X2 EQUINUS DEFORMITY OF BOTH FEET: ICD-10-CM

## 2022-02-22 DIAGNOSIS — E11.42 DM TYPE 2 WITH DIABETIC PERIPHERAL NEUROPATHY: ICD-10-CM

## 2022-02-22 DIAGNOSIS — E66.01 MORBID OBESITY WITH BMI OF 40.0-44.9, ADULT: ICD-10-CM

## 2022-02-22 PROCEDURE — 99213 OFFICE O/P EST LOW 20 MIN: CPT | Performed by: PODIATRIST

## 2022-02-22 NOTE — PROGRESS NOTES
02/22/2022  Foot and Ankle Surgery - Established Patient/Follow-up  Provider: Dr. Stanley Bueno DPM  Location: [facility]    Subjective:  Haider Holland is a 66 y.o. male.     Chief Complaint   Patient presents with   • Left Foot - Pain   • Right Foot - Pain   • Follow-up     Last pcp appt zeb Aquino MD 12/10/2021       HPI: Patient returns for routine diabetic foot check.  Patient has noticed improvement with over-the-counter arch supports and states that he has continued to wear on a daily basis.  He continues to have discomfort involving his feet along with stiffness in his legs.  He states that this is unchanged.  He has not noticed any open wounds or infections to his feet.  He has been topical foot cream to both feet which has helped with callus and dry skin production.  He does feel that his overall health and glycemic control is relatively unchanged.    No Known Allergies    Current Outpatient Medications on File Prior to Visit   Medication Sig Dispense Refill   • ammonium lactate (Lac-Hydrin) 12 % cream Apply  topically to the appropriate area as directed Daily. Both lower legs 385 g 2   • aspirin (ASPIR) 81 MG EC tablet Take 1 tablet by mouth 2 (Two) Times a Day. aspir-81  81MG oral tablet delayed release 180 tablet 1   • atorvastatin (Lipitor) 10 MG tablet Take 1 tablet by mouth Daily. 90 tablet 3   • azelastine (OPTIVAR) 0.05 % ophthalmic solution INSTILL 1 DROP INTO EACH EYE 1 TO 2 TIMES DAILY     • bumetanide (Bumex) 1 MG tablet Take 1 tablet by mouth Daily. 90 tablet 3   • Canagliflozin (Invokana) 100 MG tablet tablet Take 1 tablet by mouth Daily. 90 tablet 3   • gabapentin (NEURONTIN) 600 MG tablet Take 1 tablet by mouth 4 times per day 120 tablet 5   • glimepiride (AMARYL) 2 MG tablet Take 1 tablet by mouth 2 (Two) Times a Day. 180 tablet 3   • glucose blood (FREESTYLE LITE) test strip USE 1 STRIP TO CHECK GLUCOSE TWICE DAILY 300 each 0   • Insulin Lispro Prot & Lispro (humaLOG  "75-25) (75-25) 100 UNIT/ML suspension pen-injector pen Inject 25 Units under the skin into the appropriate area as directed 2 (Two) Times a Day With Meals. 15 mL 10   • Insulin Pen Needle (BD Pen Needle Supriya U/F) 32G X 4 MM misc Use as directed to inject insulin twice daily 100 each 3   • lisinopril (PRINIVIL,ZESTRIL) 20 MG tablet Take 1 tablet by mouth Daily. 90 tablet 3   • mupirocin (Bactroban) 2 % ointment Apply  topically to the appropriate area as directed 2 (Two) Times a Day. 30 g 2   • naproxen sodium (ALEVE) 220 MG tablet Take 220 mg by mouth 2 (Two) Times a Day As Needed.     • SITagliptin-metFORMIN HCl ER (JANUMET XR)  MG tablet Take 1 tablet by mouth 2 (Two) Times a Day. 30 tablet 5     No current facility-administered medications on file prior to visit.       Objective   /70   Pulse 83   Ht 180.3 cm (70.98\")   Wt 129 kg (284 lb)   BMI 39.63 kg/m²     General:   Appearance: appears stated age and healthy and obesity    Orientation: AAOx3    Affect: appropriate    Gait: antalgic       VASCULAR       Right Foot Vascularity   Normal vascular exam    Dorsalis pedis:  2+  Posterior tibial:  2+  Skin Temperature: warm    Edema Grading:  Pitting and 3+  CFT:  < 3 seconds  Pedal Hair Growth:  Absent      Left Foot Vascularity   Normal vascular exam    Dorsalis pedis:  2+  Posterior tibial:  2+  Skin Temperature: warm    Edema Grading:  3+ and pitting  CFT:  < 3 seconds  Pedal Hair Growth:  Absent      NEUROLOGIC      Right Foot Neurologic   Light touch sensation:  Diminished  Hot/Cold sensation: diminished    Protective Sensation using Hanford-Te Monofilament:  Diminished  Achilles reflex:  2+      Left Foot Neurologic   Light touch sensation:  Diminished  Hot/cold sensation: diminished    Protective Sensation using Hanford-Te Monofilament:  Diminished  Achilles reflex:  2+      MUSCULOSKELETAL       Right Foot Musculoskeletal    Amputation   Right toes amputated: " No    Ecchymosis:  None  Tenderness: lesser metatarsophalangeal joints, posterior heel, arch and dorsal foot    Arch:  Normal  Hammertoe:  Second toe, third toe, fourth toe and fifth toe      Left Foot Musculoskeletal    Amputation   Left toes amputated: No    Ecchymosis:  None  Tenderness: lesser metatarsophalangeal joints, posterior heel, arch and dorsal foot    Arch:  Normal  Hammertoe:  Second toe, third toe, fourth toe and fifth toe      MUSCLE STRENGTH      Right Foot Muscle Strength   Normal strength    Foot dorsiflexion:  5  Foot plantar flexion:  5  Foot inversion:  5  Foot eversion:  5      Left Foot Muscle Strength   Normal strength    Foot dorsiflexion:  5  Foot plantar flexion:  5  Foot inversion:  5  Foot eversion:  5      DERMATOLOGIC      Right Foot Dermatologic   Skin: skin intact        Left Foot Dermatologic   Skin: skin intact        TESTS      Right Foot Tests   Anterior drawer: negative    Varus tilt: negative        Left Foot Tests   Anterior drawer: negative    Varus tilt: negative      Assessment/Plan   Diagnoses and all orders for this visit:    1. Bilateral foot pain (Primary)    2. Arthritis of both feet    3. Equinus deformity of both feet    4. Chronic venous hypertension without complications, bilateral    5. Morbid obesity with BMI of 40.0-44.9, adult ([facility])    6. DM type 2 with diabetic peripheral neuropathy ([facility])      Patient returns for routine foot check.  Today, he has no open wounds or concerning features.  His physical exam is relatively unchanged.  He continues to have rigidity involving the feet as well as moderate soft tissue rigidity involving the lower extremities.  Patient does have issues involving his knees, right greater than left.  I do feel that this could be complicating his foot discomfort.  I would like him to continue wearing the inserts on a daily basis.  We did discuss at home stretching and manual therapy exercises.  We also reviewed the importance of weight  loss.  From a diabetic perspective, his most recent A1c was 6.3%.  I do feel that he is at relatively mild diabetic foot risk at this time.  I have reviewed the importance of daily foot checks and glycemic control.  He is to call with any additional issues or concerns.  Patient is to return in 6 months with MICHEL Farnsworth.  Greater than 20 minutes was spent before, during, and after evaluation for patient care.    No orders of the defined types were placed in this encounter.         Note is dictated utilizing voice recognition software. Unfortunately this leads to occasional typographical errors. I apologize in advance if the situation occurs. If questions occur please do not hesitate to call our office.

## 2022-04-08 ENCOUNTER — TELEPHONE (OUTPATIENT)
Dept: FAMILY MEDICINE CLINIC | Facility: CLINIC | Age: 67
End: 2022-04-08

## 2022-04-08 DIAGNOSIS — R60.0 EDEMA OF LOWER EXTREMITY: ICD-10-CM

## 2022-04-08 RX ORDER — LISINOPRIL 20 MG/1
TABLET ORAL
Qty: 90 TABLET | Refills: 0 | OUTPATIENT
Start: 2022-04-08

## 2022-04-08 RX ORDER — BUMETANIDE 1 MG/1
TABLET ORAL
Qty: 90 TABLET | Refills: 0 | Status: SHIPPED | OUTPATIENT
Start: 2022-04-08

## 2022-04-08 NOTE — TELEPHONE ENCOUNTER
Caller: Haider Holland    Relationship: Self    Best call back number: [phone]    Requested Prescriptions:   Requested Prescriptions     Pending Prescriptions Disp Refills   • bumetanide (BUMEX) 1 MG tablet [Pharmacy Med Name: Bumetanide 1 MG Oral Tablet] 90 tablet 0     Sig: Take 1 tablet by mouth once daily        Pharmacy where request should be sent: [facility] [address] [place], [place] [phone] [place] [phone] FX     Additional details provided by patient: PATIENT HAS APPROX A WEEK.    PATIENT STATED THAT PHARMACY CALLED AND TOLD HIM THIS WAS DENIED    Does the patient have less than a 3 day supply:  [] Yes  [x] No    Raymundo Zurita Rep   04/08/22 15:13 EDT

## 2022-04-18 RX ORDER — LISINOPRIL 20 MG/1
20 TABLET ORAL DAILY
Qty: 90 TABLET | Refills: 3 | Status: SHIPPED | OUTPATIENT
Start: 2022-04-18

## 2022-04-18 RX ORDER — LISINOPRIL 20 MG/1
TABLET ORAL
Qty: 90 TABLET | Refills: 1 | Status: SHIPPED | OUTPATIENT
Start: 2022-04-18 | End: 2022-05-26 | Stop reason: SDUPTHER

## 2022-04-18 NOTE — TELEPHONE ENCOUNTER
Called patient to see where he wanted MRI completed at. No answer. LVM for patient to call office and advise us of where he would like to have it completed at.   
Called patient. He states that he wants to have his MRI completed at [facility] here in [place].   
Order placed after clarifying with Dr. Aquino if he wanted with or without contrast.   
Patient came in and is wanting an MRI on left shoulder done- please contact patient  
Thank you for looking into that.  Please order an MRI of his left shoulder at [facility] per his request.  Diagnosis is left shoulder pain with glenohumeral arthritis and acromial spurring.  Strongly suspect rotator cuff damage.  Thanks  
20

## 2022-05-15 DIAGNOSIS — E11.42 DIABETIC PERIPHERAL NEUROPATHY ASSOCIATED WITH TYPE 2 DIABETES MELLITUS: ICD-10-CM

## 2022-05-16 RX ORDER — GABAPENTIN 600 MG/1
TABLET ORAL
Qty: 120 TABLET | Refills: 2 | Status: SHIPPED | OUTPATIENT
Start: 2022-05-16 | End: 2023-01-30

## 2022-05-20 LAB
ALBUMIN/CREAT UR: <13 MG/G CREAT (ref 0–29)
AMBIG ABBREV LP DEFAULT: NORMAL
CHOLEST SERPL-MCNC: 89 MG/DL (ref 100–199)
CREAT UR-MCNC: 23.2 MG/DL
HBA1C MFR BLD: 6.4 % (ref 4.8–5.6)
HDLC SERPL-MCNC: 32 MG/DL
LDLC SERPL CALC-MCNC: 37 MG/DL (ref 0–99)
MICROALBUMIN UR-MCNC: <3 UG/ML
TRIGL SERPL-MCNC: 103 MG/DL (ref 0–149)
VLDLC SERPL CALC-MCNC: 20 MG/DL (ref 5–40)

## 2022-05-25 PROBLEM — I10 HYPERTENSIVE DISORDER: Status: ACTIVE | Noted: 2022-05-25

## 2022-05-25 PROBLEM — E11.9 DIABETES MELLITUS: Status: ACTIVE | Noted: 2022-05-25

## 2022-05-25 PROBLEM — E66.9 OBESITY: Status: ACTIVE | Noted: 2022-05-25

## 2022-05-25 PROBLEM — E11.40 DIABETIC NEUROPATHY: Status: ACTIVE | Noted: 2022-05-25

## 2022-05-25 PROBLEM — I25.10 CORONARY ARTERIOSCLEROSIS: Status: ACTIVE | Noted: 2022-05-25

## 2022-05-25 PROBLEM — M17.9 OSTEOARTHRITIS OF KNEE: Status: ACTIVE | Noted: 2022-05-25

## 2022-05-25 PROBLEM — M25.569 KNEE PAIN: Status: ACTIVE | Noted: 2022-05-25

## 2022-05-25 PROBLEM — G25.81 RESTLESS LEGS: Status: ACTIVE | Noted: 2022-05-25

## 2022-05-25 PROBLEM — N20.1 URETERIC STONE: Status: ACTIVE | Noted: 2022-05-25

## 2022-05-25 RX ORDER — LISINOPRIL 10 MG/1
TABLET ORAL
COMMUNITY
End: 2022-05-26 | Stop reason: SDUPTHER

## 2022-05-25 RX ORDER — DICYCLOMINE HYDROCHLORIDE 10 MG/1
CAPSULE ORAL
COMMUNITY
End: 2022-06-10

## 2022-05-26 ENCOUNTER — OFFICE VISIT (OUTPATIENT)
Dept: ENDOCRINOLOGY | Facility: CLINIC | Age: 67
End: 2022-05-26

## 2022-05-26 VITALS
HEART RATE: 82 BPM | BODY MASS INDEX: 40.88 KG/M2 | OXYGEN SATURATION: 96 % | DIASTOLIC BLOOD PRESSURE: 70 MMHG | HEIGHT: 71 IN | WEIGHT: 292 LBS | SYSTOLIC BLOOD PRESSURE: 115 MMHG

## 2022-05-26 DIAGNOSIS — Z79.4 TYPE 2 DIABETES MELLITUS WITH HYPERGLYCEMIA, WITH LONG-TERM CURRENT USE OF INSULIN: Primary | ICD-10-CM

## 2022-05-26 DIAGNOSIS — I10 HYPERTENSION, BENIGN: ICD-10-CM

## 2022-05-26 DIAGNOSIS — E11.65 TYPE 2 DIABETES MELLITUS WITH HYPERGLYCEMIA, WITH LONG-TERM CURRENT USE OF INSULIN: Primary | ICD-10-CM

## 2022-05-26 DIAGNOSIS — E78.2 MIXED HYPERLIPIDEMIA: ICD-10-CM

## 2022-05-26 DIAGNOSIS — E11.42 DIABETIC PERIPHERAL NEUROPATHY ASSOCIATED WITH TYPE 2 DIABETES MELLITUS: ICD-10-CM

## 2022-05-26 LAB — GLUCOSE BLDC GLUCOMTR-MCNC: 79 MG/DL (ref 70–105)

## 2022-05-26 PROCEDURE — 82962 GLUCOSE BLOOD TEST: CPT | Performed by: INTERNAL MEDICINE

## 2022-05-26 PROCEDURE — 99214 OFFICE O/P EST MOD 30 MIN: CPT | Performed by: INTERNAL MEDICINE

## 2022-05-26 NOTE — PROGRESS NOTES
Endocrine Progress Note Outpatient     Patient Care Team:  Cuca Aquino MD as PCP - General (Family Medicine)    Chief Complaint: Follow up type 2 diabetes    HPI: 67-year-old male with history of type 2 diabetes, hypertension, hyperlipidemia and obesity is here for follow-up.    For type 2 diabetes he is currently on Janumet XR 50/thousand, 2 tablets p.o. daily, glimepiride 2 mg twice a day, Invokana 100 medium daily and Humalog mix 75/25, 20 units twice a day.  He did bring in blood sugar records for review and they are running mostly between 100-160.  He does not have low blood sugars.  Is trying his best for his diet.  He has not gotten flu or pneumonia vaccine.      Hypertension: Well-controlled.    Hyperlipidemia: On atorvastatin.    Obesity: He is advised to continue to work on his diet and activity.    Diabetic peripheral neuropathy: Stable.     Past Medical History:   Diagnosis Date   • Abnormal liver function tests 09/27/2017   • Bilateral lower extremity edema 02/05/2019   • BMI 40.0-44.9, adult ([facility]) 02/05/2019   • CAD (coronary artery disease) 10/24/2017   • Cellulitis of leg, right 04/02/2019   • Chest pain 10/24/2017   • Cough 02/06/2018   • Diabetes mellitus, type II ([facility]) 10/24/2017   • Hyperlipidemia 09/27/2017   • Hypertension, benign 09/27/2017   • MI (myocardial infarction) ([facility]) 2008   • Neuropathy 08/07/2018   • Peripheral neuropathy    • Screening for colon cancer 02/05/2019   • Screening PSA (prostate specific antigen) 02/05/2019   • Skin abscess 09/18/2018   • Sore throat 02/06/2018   • Special screening for malignant neoplasm of prostate 11/30/2017   • Type 2 diabetes, uncontrolled, with neuropathy 09/27/2017    DM typeII with peripheral neuropathy uncontrolled   • URI, acute 02/06/2018   • Well adult exam 11/30/2017       Social History     Socioeconomic History   • Marital status:      Spouse name: Brenda   • Number of children: 2   • Years of education: 12   Tobacco  Use   • Smoking status: Never Smoker   • Smokeless tobacco: Never Used   Vaping Use   • Vaping Use: Never used   Substance and Sexual Activity   • Alcohol use: No   • Drug use: No   • Sexual activity: Defer       Family History   Problem Relation Age of Onset   • Diabetes Mother    • Heart disease Mother    • Hypertension Mother    • Heart disease Father    • Diabetes Brother    • Cancer Brother        No Known Allergies    ROS:   Constitutional:  Denies fatigue, tiredness.    Eyes:  Denies change in visual acuity   HENT:  Denies nasal congestion or sore throat   Respiratory: denies cough, shortness of breath.   Cardiovascular:  denies chest pain, edema   GI:  Denies abdominal pain, nausea, vomiting.   Musculoskeletal:  Denies back pain or joint pain   Integument:  Denies dry skin and rash   Neurologic:  Denies headache, focal weakness or sensory changes   Endocrine:  Denies polyuria or polydipsia   Psychiatric:  Denies depression or anxiety      Vitals:    05/26/22 1438   BP: 115/70   Pulse: 82   SpO2: 96%     BMI: 41.2  Physical Exam:  GEN: NAD, conversant, obese  EYES: EOMI, PERRL, no conjunctival erythema  NECK: no thyromegaly, full ROM   CV: RRR, no murmurs/rubs/gallops, no peripheral edema  LUNG: CTAB, no wheezes/rales/ronchi  SKIN: no rashes, no acanthosis  MSK: no deformities, full ROM of all extremities  NEURO: no tremors, DTR normal  PSYCH: AOX3, appropriate mood, affect normal  Feet: Has calluses on both feet, no ulcer      Results Review:     I reviewed the patient's new clinical results.    Lab Results   Component Value Date    HGBA1C 6.4 (H) 05/19/2022    HGBA1C 6.3 (H) 11/09/2021    HGBA1C 6.9 (H) 05/11/2021      Lab Results   Component Value Date    GLUCOSE 139 (H) 11/09/2021    BUN 17 11/09/2021    CREATININE 1.01 11/09/2021    EGFRIFNONA 77 11/09/2021    EGFRIFAFRI 89 11/09/2021    BCR 17 11/09/2021    K 4.5 11/09/2021    CO2 24 11/09/2021    CALCIUM 9.0 11/09/2021    PROTENTOTREF 6.5 11/09/2021     ALBUMIN 3.8 11/09/2021    LABIL2 1.4 11/09/2021    AST 29 11/09/2021    ALT 28 11/09/2021    CHOL 91 05/11/2021    TRIG 103 05/19/2022    LDL 37 05/19/2022    HDL 32 (L) 05/19/2022     Lab Results   Component Value Date    TSH 3.51 12/01/2017         Medication Review: Reviewed.       Current Outpatient Medications:   •  ammonium lactate (Lac-Hydrin) 12 % cream, Apply  topically to the appropriate area as directed Daily. Both lower legs, Disp: 385 g, Rfl: 2  •  atorvastatin (Lipitor) 10 MG tablet, Take 1 tablet by mouth Daily., Disp: 90 tablet, Rfl: 3  •  azelastine (OPTIVAR) 0.05 % ophthalmic solution, INSTILL 1 DROP INTO EACH EYE 1 TO 2 TIMES DAILY, Disp: , Rfl:   •  bumetanide (BUMEX) 1 MG tablet, Take 1 tablet by mouth once daily, Disp: 90 tablet, Rfl: 0  •  Canagliflozin (Invokana) 100 MG tablet tablet, Take 1 tablet by mouth Daily., Disp: 90 tablet, Rfl: 3  •  dicyclomine (BENTYL) 10 MG capsule, dicyclomine 10 mg capsule, Disp: , Rfl:   •  gabapentin (NEURONTIN) 600 MG tablet, Take 1 tablet by mouth 4 times daily, Disp: 120 tablet, Rfl: 2  •  glimepiride (AMARYL) 2 MG tablet, Take 1 tablet by mouth 2 (Two) Times a Day., Disp: 180 tablet, Rfl: 3  •  glucose blood (FREESTYLE LITE) test strip, USE 1 STRIP TO CHECK GLUCOSE TWICE DAILY, Disp: 300 each, Rfl: 0  •  Insulin Lispro Prot & Lispro (humaLOG 75-25) (75-25) 100 UNIT/ML suspension pen-injector pen, Inject 25 Units under the skin into the appropriate area as directed 2 (Two) Times a Day With Meals., Disp: 15 mL, Rfl: 10  •  Insulin Pen Needle (BD Pen Needle Supriya U/F) 32G X 4 MM misc, Use as directed to inject insulin twice daily, Disp: 100 each, Rfl: 3  •  lisinopril (PRINIVIL,ZESTRIL) 20 MG tablet, Take 1 tablet by mouth Daily., Disp: 90 tablet, Rfl: 3  •  metFORMIN (GLUCOPHAGE) 1000 MG tablet, Every 12 (Twelve) Hours., Disp: , Rfl:   •  mupirocin (Bactroban) 2 % ointment, Apply  topically to the appropriate area as directed 2 (Two) Times a Day., Disp: 30 g,  Rfl: 2  •  naproxen sodium (ALEVE) 220 MG tablet, Take 220 mg by mouth 2 (Two) Times a Day As Needed., Disp: , Rfl:   •  SITagliptin-metFORMIN HCl ER (JANUMET XR)  MG tablet, Take 2 tablets by mouth Daily., Disp: 60 tablet, Rfl: 5      Assessment & Plan   1.  Diabetes mellitus type 2: Excellent control with A1c of 6.3%.  I have reviewed his blood sugar records.  At this time I will continue his current regimen Janumet, Invokana, glimepiride and Humalog mix 75/25 insulin.  And follow blood sugars and A1c.  I advised him to get annual eye exam and flu vaccine however he does not want to get a flu shot but he is interested in getting pneumonia vaccine today.  Advised to always keep glucose source with him in case of low blood sugar.    2.  Hypertension: Much better, continue lisinopril..    3.  Hyperlipidemia: Well-controlled, on  atorvastatin 10 mg p.o. daily.    4.  Obesity: Advised to continue work on diet and activity and try to lose some weight.    5.  Diabetic peripheral neuropathy: Stable.    Assessment and plan from November 16, 2021 reviewed and updated.            Tono Allison MD FACE.    Much of the above report is an electronic transcription/translation of the spoken language to printed text using Dragon Software. As such, the subtleties and finesse of the spoken language may permit erroneous, or at times, nonsensical words or phrases to be inadvertently transcribed; thus changes may be made at a later date to rectify these errors.

## 2022-06-10 ENCOUNTER — OFFICE VISIT (OUTPATIENT)
Dept: FAMILY MEDICINE CLINIC | Facility: CLINIC | Age: 67
End: 2022-06-10

## 2022-06-10 VITALS
OXYGEN SATURATION: 98 % | HEART RATE: 77 BPM | RESPIRATION RATE: 16 BRPM | TEMPERATURE: 98.6 F | DIASTOLIC BLOOD PRESSURE: 58 MMHG | HEIGHT: 71 IN | SYSTOLIC BLOOD PRESSURE: 118 MMHG | WEIGHT: 298.2 LBS | BODY MASS INDEX: 41.75 KG/M2

## 2022-06-10 DIAGNOSIS — Z11.59 NEED FOR HEPATITIS C SCREENING TEST: ICD-10-CM

## 2022-06-10 DIAGNOSIS — E78.2 MIXED HYPERLIPIDEMIA: ICD-10-CM

## 2022-06-10 DIAGNOSIS — Z79.4 TYPE 2 DIABETES MELLITUS WITH HYPERGLYCEMIA, WITH LONG-TERM CURRENT USE OF INSULIN: ICD-10-CM

## 2022-06-10 DIAGNOSIS — Z12.11 COLON CANCER SCREENING: ICD-10-CM

## 2022-06-10 DIAGNOSIS — I10 HYPERTENSION, BENIGN: Primary | ICD-10-CM

## 2022-06-10 DIAGNOSIS — E11.65 UNCONTROLLED TYPE 2 DIABETES MELLITUS WITH HYPERGLYCEMIA: ICD-10-CM

## 2022-06-10 DIAGNOSIS — I35.0 MILD AORTIC STENOSIS BY PRIOR ECHOCARDIOGRAM: ICD-10-CM

## 2022-06-10 DIAGNOSIS — E11.65 TYPE 2 DIABETES MELLITUS WITH HYPERGLYCEMIA, WITH LONG-TERM CURRENT USE OF INSULIN: ICD-10-CM

## 2022-06-10 PROCEDURE — 99214 OFFICE O/P EST MOD 30 MIN: CPT | Performed by: FAMILY MEDICINE

## 2022-06-10 RX ORDER — BLOOD-GLUCOSE METER
KIT MISCELLANEOUS
Qty: 300 EACH | Refills: 0 | Status: SHIPPED | OUTPATIENT
Start: 2022-06-10 | End: 2022-10-27

## 2022-06-10 NOTE — PROGRESS NOTES
Subjective   Haider Holland is a 67 y.o. male.   Chief Complaint   Patient presents with   • Hypertension   • Hyperlipidemia       History of Present Illness   Presents to the office today to follow-up on chronic medical problems per active problem list as below.  Last visit here with me was a Medicare wellness in December of last year.    HTN-blood pressure today in the office is excellent at 118/58.    HLD- continues on Lipitor 10 mg/day.  He had a lipid panel done about 3 weeks ago per Dr. Allison.  He is tolerating the Lipitor without myalgias.    Diabetes type 2-she has recently transitioned care to Dr. Allison over his diabetes.  Asks for RF on test strips.    C/o bilateral foot pain. Sees Dr. Bueno.  Got insoles.  Do help.  Going to see an ortho surgeon soon.     He is due for screening colonoscopy.      Patient Active Problem List    Diagnosis Date Noted   • Mild aortic stenosis by prior echocardiogram 06/10/2022     Note Last Updated: 6/10/2022     Echo - January 2020     • Diabetic neuropathy (HCC) 05/25/2022   • Osteoarthritis of knee 05/25/2022   • Restless legs 05/25/2022   • Ureteric stone 05/25/2022   • Obesity 05/25/2022   • Coronary arteriosclerosis 05/25/2022   • Hypertensive disorder 05/25/2022   • Diabetes mellitus (HCC) 05/25/2022   • Knee pain 05/25/2022   • Arthritis of right glenohumeral joint 11/24/2020   • Venous insufficiency of both lower extremities 02/17/2020   • Mild nonproliferative diabetic retinopathy without macular edema associated with type 2 diabetes mellitus (HCC) 01/20/2020     Note Last Updated: 1/20/2020     Dr. Mcleod  Local opto - Dr. 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     • Body mass index (BMI) of 40.0-44.9 in adult ([facility]) 02/05/2019   • Edema of lower extremity 02/05/2019   • Neuropathy 08/07/2018   • Coronary artery disease 10/24/2017     Note Last Updated: 12/13/2019     MI in 2008 and 2012.  Had cath with stent - 2017     • Type 2 diabetes mellitus with hyperglycemia, with long-term current use of insulin ([facility]) 10/24/2017     Note Last Updated: 1/11/2020     Managed by endocrinology     • Abnormal liver function tests 09/27/2017   • Diabetic peripheral neuropathy associated with type 2 diabetes mellitus ([facility]) 09/27/2017   • Mixed hyperlipidemia 09/27/2017   • Hypertension, benign 09/27/2017           Past Surgical History:   Procedure Laterality Date   • CARDIAC CATHETERIZATION  11/27/2017, 2008    with stent in 2017   • CHOLECYSTECTOMY  12/11/2017   • EYE SURGERY  2019   • EYE SURGERY Left 11/19/2020   • OTHER SURGICAL HISTORY      rt arm sx     Current Outpatient Medications on File Prior to Visit   Medication Sig   • atorvastatin (Lipitor) 10 MG tablet Take 1 tablet by mouth Daily.   • azelastine (OPTIVAR) 0.05 % ophthalmic solution INSTILL 1 DROP INTO EACH EYE 1 TO 2 TIMES DAILY   • bumetanide (BUMEX) 1 MG tablet Take 1 tablet by mouth once daily   • Canagliflozin (Invokana) 100 MG tablet tablet Take 1 tablet by mouth Daily.   • gabapentin (NEURONTIN) 600 MG tablet Take 1 tablet by mouth 4 times daily   • glimepiride (AMARYL) 2 MG tablet Take 1 tablet by mouth 2 (Two) Times a Day.   • Insulin Lispro Prot & Lispro (humaLOG 75-25) (75-25) 100 UNIT/ML suspension pen-injector pen Inject 25 Units under the skin into the appropriate area as directed 2 (Two) Times a Day With Meals.   • Insulin Pen Needle (BD Pen Needle Supriya U/F) 32G X 4 MM misc Use as directed to inject insulin twice daily   • lisinopril (PRINIVIL,ZESTRIL) 20 MG tablet Take 1 tablet by mouth Daily.   • metFORMIN (GLUCOPHAGE) 1000 MG tablet Take 1,000 mg by mouth 2 (Two) Times a Day With Meals.   •  "mupirocin (Bactroban) 2 % ointment Apply  topically to the appropriate area as directed 2 (Two) Times a Day.   • naproxen sodium (ALEVE) 220 MG tablet Take 220 mg by mouth 2 (Two) Times a Day As Needed.   • SITagliptin-metFORMIN HCl ER (JANUMET XR)  MG tablet Take 2 tablets by mouth Daily.   • [DISCONTINUED] glucose blood (FREESTYLE LITE) test strip USE 1 STRIP TO CHECK GLUCOSE TWICE DAILY   • ammonium lactate (Lac-Hydrin) 12 % cream Apply  topically to the appropriate area as directed Daily. Both lower legs   • [DISCONTINUED] dicyclomine (BENTYL) 10 MG capsule dicyclomine 10 mg capsule     No current facility-administered medications on file prior to visit.     No Known Allergies  Social History     Socioeconomic History   • Marital status:      Spouse name: Brenda   • Number of children: 2   • Years of education: 12   Tobacco Use   • Smoking status: Never Smoker   • Smokeless tobacco: Never Used   Vaping Use   • Vaping Use: Never used   Substance and Sexual Activity   • Alcohol use: No   • Drug use: No   • Sexual activity: Yes     Partners: Female     Birth control/protection: None     Family History   Problem Relation Age of Onset   • Diabetes Mother    • Heart disease Mother    • Hypertension Mother    • Heart disease Father    • Diabetes Brother    • Cancer Brother        Review of Systems    Objective   /58 (BP Location: Left arm, Patient Position: Sitting, Cuff Size: Large Adult)   Pulse 77   Temp 98.6 °F (37 °C) (Infrared)   Resp 16   Ht 180.3 cm (70.98\")   Wt 135 kg (298 lb 3.2 oz)   SpO2 98%   BMI 41.61 kg/m²   Physical Exam  Constitutional:       Appearance: He is well-developed.      Comments: Wearing a face mask     HENT:      Head: Normocephalic and atraumatic.   Eyes:      Conjunctiva/sclera: Conjunctivae normal.   Cardiovascular:      Rate and Rhythm: Normal rate.      Heart sounds: Murmur heard.    Systolic murmur is present with a grade of 3/6.  Pulmonary:      Effort: " Pulmonary effort is normal. No respiratory distress.      Breath sounds: Normal breath sounds.   Musculoskeletal:         General: Normal range of motion.      Cervical back: Normal range of motion.      Right lower leg: Edema (trace) present.      Left lower leg: Edema (trace) present.   Skin:     General: Skin is warm and dry.      Findings: No rash.   Neurological:      Mental Status: He is alert and oriented to person, place, and time.   Psychiatric:         Behavior: Behavior normal.           Office Visit on 05/26/2022   Component Date Value Ref Range Status   • Glucose 05/26/2022 79  70 - 105 mg/dL Final    Serial Number: [id number]:  [id number]   Orders Only on 05/19/2022   Component Date Value Ref Range Status   • Total Cholesterol 05/19/2022 89 (A) 100 - 199 mg/dL Final   • Triglycerides 05/19/2022 103  0 - 149 mg/dL Final   • HDL Cholesterol 05/19/2022 32 (A) >39 mg/dL Final   • VLDL Cholesterol Naresh 05/19/2022 20  5 - 40 mg/dL Final   • LDL Chol Calc (NIH) 05/19/2022 37  0 - 99 mg/dL Final   • Creatinine, Urine 05/19/2022 23.2  Not Estab. mg/dL Final   • Microalbumin, Urine 05/19/2022 <3.0  Not Estab. ug/mL Final   • Microalbumin/Creatinine Ratio 05/19/2022 <13  0 - 29 mg/g creat Final    Comment:                        Normal:                0 -  29                         Moderately increased: 30 - 300                         Severely increased:       >300     • Hemoglobin A1C 05/19/2022 6.4 (A) 4.8 - 5.6 % Final    Comment:          Prediabetes: 5.7 - 6.4           Diabetes: >6.4           Glycemic control for adults with diabetes: <7.0     • Ambig Abbrev LP Default 05/19/2022 Comment   Final    Comment: A hand-written panel/profile was received from your office. In  accordance with the [facility] Ambiguous Test Code Policy dated July 2003, we have completed your order by using the closest currently  or formerly recognized AMA panel.  We have assigned Lipid Panel,  Test Code #571193 to this  request. If this is not the testing you  wished to receive on this specimen, please contact the [facility]  Client Inquiry/Technical Services Department to clarify the test  order.  We appreciate your business.           Assessment & Plan   Diagnoses and all orders for this visit:    1. Hypertension, benign (Primary)    2. Uncontrolled type 2 diabetes mellitus with hyperglycemia (HCC)  -     glucose blood (FREESTYLE LITE) test strip; USE 1 STRIP TO CHECK GLUCOSE TWICE DAILY  Dispense: 300 each; Refill: 0    3. Mixed hyperlipidemia    4. Type 2 diabetes mellitus with hyperglycemia, with long-term current use of insulin (HCC)    5. Need for hepatitis C screening test  -     Hepatitis C Antibody; Future    6. Colon cancer screening  -     Ambulatory Referral For Screening Colonoscopy    7. Mild aortic stenosis by prior echocardiogram    Multiple chronic issues as above.  Hypertension, hyperlipidemia, diabetes type 2 all controlled.  Diabetes type 2 managed by Dr. Allison.  He asked me for refill on test strips to avoid running out before the weekend.  I took a quick look at his labs and his last A1c was excellent at 6.4% about 3 weeks ago.  At this point I would recommend he continue Lipitor 10 mg/day for for his hyperlipidemia and lisinopril for hypertension at current dose of 20 mg/day.  Add hep C Ab to labs due in August.  Keep f/u with Dr. Bueno regarding bilateral foot pains.  Will send in a new referral to Dr. Khan for colonoscopy.  Heart murmur on exam today is unchanged.  No signs of fluid overload such as crackles at the bases of the lungs, PND, orthopnea.  Its been 2 years since his last echocardiogram to follow his aortic stenosis.  If situation changes, will repeat echo and reconsult cardiology.      Call with any problems or concerns before next visit       Return in about 6 months (around 12/10/2022), or needs Medicare Wellness at next visit.      Much of this report is an electronic transcription of  spoken language to printed text using Dragon dictation software.  As such, the subtleties and finesse of spoken language may permit erroneous, or at times, nonsensical words or phrases to be inadvertently transcribed; thus changes may be made at a later date to rectify these errors.     Cuca Aquino [id number] EDT  This note has been electronically signed

## 2022-08-22 ENCOUNTER — OFFICE VISIT (OUTPATIENT)
Dept: PODIATRY | Facility: CLINIC | Age: 67
End: 2022-08-22

## 2022-08-22 VITALS — BODY MASS INDEX: 42.66 KG/M2 | HEIGHT: 70 IN | WEIGHT: 298 LBS | HEART RATE: 85 BPM

## 2022-08-22 DIAGNOSIS — R09.89 DECREASED PULSES IN FEET: ICD-10-CM

## 2022-08-22 DIAGNOSIS — E11.65 TYPE 2 DIABETES MELLITUS WITH HYPERGLYCEMIA, WITH LONG-TERM CURRENT USE OF INSULIN: ICD-10-CM

## 2022-08-22 DIAGNOSIS — I87.303 CHRONIC VENOUS HYPERTENSION WITHOUT COMPLICATIONS, BILATERAL: ICD-10-CM

## 2022-08-22 DIAGNOSIS — M79.672 BILATERAL FOOT PAIN: Primary | ICD-10-CM

## 2022-08-22 DIAGNOSIS — Z79.4 TYPE 2 DIABETES MELLITUS WITH HYPERGLYCEMIA, WITH LONG-TERM CURRENT USE OF INSULIN: ICD-10-CM

## 2022-08-22 DIAGNOSIS — B35.1 ONYCHOMYCOSIS: ICD-10-CM

## 2022-08-22 DIAGNOSIS — M79.671 BILATERAL FOOT PAIN: Primary | ICD-10-CM

## 2022-08-22 DIAGNOSIS — E11.42 DM TYPE 2 WITH DIABETIC PERIPHERAL NEUROPATHY: ICD-10-CM

## 2022-08-22 PROCEDURE — 11721 DEBRIDE NAIL 6 OR MORE: CPT

## 2022-08-22 PROCEDURE — 99213 OFFICE O/P EST LOW 20 MIN: CPT

## 2022-08-23 NOTE — PROGRESS NOTES
"08/22/2022  Foot and Ankle Surgery - Established Patient/Follow-up  Provider: LAURYN Wolfe   Location: [facility]    Subjective:  Haider Holland is a 67 y.o. male.     Chief Complaint   Patient presents with   • Right Foot - Follow-up     Dm foot care   • Left Foot - Follow-up     Dm foot care   • Follow-up     ALONZO Aquino md 12/10/2021       HPI: Patient presents to office today, known to service, for routine diabetic foot check.  Patient is requesting nail debridement today and reports nails are too thick and he is unable to get to them to cut them safely.  Patient denies any new issues with his feet but does report that he feels like he has \"something\" on his left lower leg from I am kitten scratching him.  Patient reports blood sugars have been well controlled, chart review reveals last A1c was 6.4%.    No Known Allergies    Current Outpatient Medications on File Prior to Visit   Medication Sig Dispense Refill   • ammonium lactate (Lac-Hydrin) 12 % cream Apply  topically to the appropriate area as directed Daily. Both lower legs 385 g 2   • atorvastatin (Lipitor) 10 MG tablet Take 1 tablet by mouth Daily. 90 tablet 3   • azelastine (OPTIVAR) 0.05 % ophthalmic solution INSTILL 1 DROP INTO EACH EYE 1 TO 2 TIMES DAILY     • bumetanide (BUMEX) 1 MG tablet Take 1 tablet by mouth once daily 90 tablet 0   • Canagliflozin (Invokana) 100 MG tablet tablet Take 1 tablet by mouth Daily. 90 tablet 3   • gabapentin (NEURONTIN) 600 MG tablet Take 1 tablet by mouth 4 times daily 120 tablet 2   • glimepiride (AMARYL) 2 MG tablet Take 1 tablet by mouth 2 (Two) Times a Day. 180 tablet 3   • glucose blood (FREESTYLE LITE) test strip USE 1 STRIP TO CHECK GLUCOSE TWICE DAILY 300 each 0   • Insulin Lispro Prot & Lispro (humaLOG 75-25) (75-25) 100 UNIT/ML suspension pen-injector pen Inject 25 Units under the skin into the appropriate area as directed 2 (Two) Times a Day With Meals. 15 mL 10   • Insulin Pen Needle " "(BD Pen Needle Supriya U/F) 32G X 4 MM misc Use as directed to inject insulin twice daily 100 each 3   • lisinopril (PRINIVIL,ZESTRIL) 20 MG tablet Take 1 tablet by mouth Daily. 90 tablet 3   • metFORMIN (GLUCOPHAGE) 1000 MG tablet Take 1,000 mg by mouth 2 (Two) Times a Day With Meals.     • mupirocin (Bactroban) 2 % ointment Apply  topically to the appropriate area as directed 2 (Two) Times a Day. 30 g 2   • naproxen sodium (ALEVE) 220 MG tablet Take 220 mg by mouth 2 (Two) Times a Day As Needed.     • SITagliptin-metFORMIN HCl ER (JANUMET XR)  MG tablet Take 2 tablets by mouth Daily. 60 tablet 5     No current facility-administered medications on file prior to visit.       Objective   Pulse 85   Ht 177.8 cm (70\")   Wt 135 kg (298 lb)   BMI 42.76 kg/m²     Foot/Ankle Exam:       General:   Diabetic Foot Exam Performed    Appearance: appears stated age and healthy and disheveled    Orientation: AAOx3    Affect: appropriate    Gait: unimpaired    Assistance: independent    Shoe Gear:  Casual shoes    VASCULAR      Right Foot Vascularity   Dorsalis pedis:  1+ (Faint)  Skin Temperature: warm    Edema Grading:  Trace  CFT:  < 3 seconds  Pedal Hair Growth:  Absent  Varicosities: none       Left Foot Vascularity   Dorsalis pedis:  1+ (Faint)  Skin Temperature: warm    Edema Grading:  Trace  CFT:  < 3 seconds  Pedal Hair Growth:  Absent      NEUROLOGIC     Right Foot Neurologic   Light touch sensation:  Diminished  Protective Sensation using [employer] Monofilament:  1     Left Foot Neurologic   Light touch sensation:  Diminished  Protective Sensation using [employer] Monofilament:  1     MUSCULOSKELETAL      Right Foot Musculoskeletal   Ecchymosis:  None  Tenderness: none       Left Foot Musculoskeletal   Ecchymosis:  None  Tenderness: none       DERMATOLOGIC     Right Foot Dermatologic   Skin: dry skin and skin changes    Nails: onychomycosis, abnormally thick, subungual debris and dystrophic nails  "      Left Foot Dermatologic   Skin: dry skin and skin changes    Nails: onychomycosis, abnormally thick, subungual debris and dystrophic nails        Right Foot Additional Comments Hemosiderin staining to bilateral lower extremities       Left Foot Additional Comments: Dried drainage to left lateral lower leg easily cleaned away no open wound      Assessment & Plan   Diagnoses and all orders for this visit:    1. Bilateral foot pain (Primary)  -     Doppler Arterial Multi Level Lower Extremity - Bilateral CAR; Future    2. Decreased pulses in feet  -     Doppler Arterial Multi Level Lower Extremity - Bilateral CAR; Future    3. Onychomycosis    4. Chronic venous hypertension without complications, bilateral    5. DM type 2 with diabetic peripheral neuropathy (HCC)    6. Type 2 diabetes mellitus with hyperglycemia, with long-term current use of insulin (HCC)      Bilateral feet appear stable with no open wounds or signs of active acute infection.  Do feel patient is at moderate risk for pedal complications due to diabetes.  Like for patient to obtain WENDI to understand full risk given pedal pulses are very difficult to palpate.  Patient has changes to bilateral lower extremities consistent with venous insufficiency.  Patient reports that he has tried to wear compression stockings in the past but was not able to tolerate daily.Explained importance of diabetic foot care, daily foot checks, and glycemic control. Patient should check both feet on a daily basis, monitor and control blood sugars, make sure that both feet and in between toes are towel dried after baths or showers. Avoid barefoot walking at all times. Check shoes before putting them on.   Patient was given information on proper foot care. Call the office at the first signs of a wound or with signs of infection.    Nail debridement: Both feet x10    Consent and time out was performed before proceeding with the procedure. Nails were debrided with a nail nipper  without complication.  No anesthesia was required.  Indications for procedure were thickened, dystrophic, and fungal appearing nails which are difficult to trim.  Proper self-care and technique was discussed with patient.  Patient was stable after procedure.    Would like for patient to follow-up in 2 months and to call with any questions or concerns should they arise before scheduled appointment.  Did encourage patient to apply moisturizer to bilateral lower extremities, including feet.  Encourage patient to call with any questions or concerns should they arise before scheduled appointment.  Patient verbalized under standing and agreeable to plan at this time.      No orders of the defined types were placed in this encounter.         Note is dictated utilizing voice recognition software. Unfortunately this leads to occasional typographical errors. I apologize in advance if the situation occurs. If questions occur please do not hesitate to call our office.

## 2022-09-01 ENCOUNTER — HOSPITAL ENCOUNTER (OUTPATIENT)
Dept: CARDIOLOGY | Facility: HOSPITAL | Age: 67
Discharge: HOME | End: 2022-09-01

## 2022-09-01 DIAGNOSIS — M79.672 BILATERAL FOOT PAIN: ICD-10-CM

## 2022-09-01 DIAGNOSIS — M79.671 BILATERAL FOOT PAIN: ICD-10-CM

## 2022-09-01 DIAGNOSIS — R09.89 DECREASED PULSES IN FEET: ICD-10-CM

## 2022-09-01 LAB
BH CV LOWER ARTERIAL LEFT ABI RATIO: 1.03
BH CV LOWER ARTERIAL LEFT DORSALIS PEDIS SYS MAX: 136
BH CV LOWER ARTERIAL LEFT GREAT TOE SYS MAX: 109
BH CV LOWER ARTERIAL LEFT POST TIBIAL SYS MAX: 134
BH CV LOWER ARTERIAL LEFT TBI RATIO: 0.83
BH CV LOWER ARTERIAL RIGHT ABI RATIO: 1.08
BH CV LOWER ARTERIAL RIGHT DORSALIS PEDIS SYS MAX: 141
BH CV LOWER ARTERIAL RIGHT GREAT TOE SYS MAX: 144
BH CV LOWER ARTERIAL RIGHT POST TIBIAL SYS MAX: 143
BH CV LOWER ARTERIAL RIGHT TBI RATIO: 1.09
MAXIMAL PREDICTED HEART RATE: 153 BPM
STRESS TARGET HR: 130 BPM
UPPER ARTERIAL LEFT ARM BRACHIAL SYS MAX: 128 MMHG
UPPER ARTERIAL RIGHT ARM BRACHIAL SYS MAX: 132 MMHG

## 2022-09-01 PROCEDURE — 93922 UPR/L XTREMITY ART 2 LEVELS: CPT

## 2022-09-06 ENCOUNTER — TELEPHONE (OUTPATIENT)
Dept: PODIATRY | Facility: CLINIC | Age: 67
End: 2022-09-06

## 2022-09-06 NOTE — TELEPHONE ENCOUNTER
----- Message from LAURYN Vo sent at 9/6/2022  8:01 AM EDT -----  Please call patient and let him know WENDI results were normal. Thank you.   ----- Message -----  From: Esteban José MD  Sent: 9/1/2022   3:36 PM EDT  To: LAURYN Vo

## 2022-09-09 ENCOUNTER — OFFICE VISIT (OUTPATIENT)
Dept: FAMILY MEDICINE CLINIC | Facility: CLINIC | Age: 67
End: 2022-09-09

## 2022-09-09 VITALS
OXYGEN SATURATION: 97 % | TEMPERATURE: 97.7 F | DIASTOLIC BLOOD PRESSURE: 60 MMHG | BODY MASS INDEX: 43.09 KG/M2 | WEIGHT: 301 LBS | HEIGHT: 70 IN | HEART RATE: 60 BPM | SYSTOLIC BLOOD PRESSURE: 112 MMHG

## 2022-09-09 DIAGNOSIS — G89.29 CHRONIC MIDLINE LOW BACK PAIN, UNSPECIFIED WHETHER SCIATICA PRESENT: ICD-10-CM

## 2022-09-09 DIAGNOSIS — Z09 FOLLOW-UP EXAM: Primary | ICD-10-CM

## 2022-09-09 DIAGNOSIS — M54.50 CHRONIC MIDLINE LOW BACK PAIN, UNSPECIFIED WHETHER SCIATICA PRESENT: ICD-10-CM

## 2022-09-09 DIAGNOSIS — M47.26 OTHER SPONDYLOSIS WITH RADICULOPATHY, LUMBAR REGION: ICD-10-CM

## 2022-09-09 PROCEDURE — 99214 OFFICE O/P EST MOD 30 MIN: CPT | Performed by: NURSE PRACTITIONER

## 2022-09-09 RX ORDER — TRAMADOL HYDROCHLORIDE 50 MG/1
50 TABLET ORAL EVERY 8 HOURS PRN
Qty: 60 TABLET | Refills: 0 | Status: SHIPPED | OUTPATIENT
Start: 2022-09-09

## 2022-09-09 RX ORDER — METHOCARBAMOL 750 MG/1
1500 TABLET, FILM COATED ORAL 3 TIMES DAILY
COMMUNITY

## 2022-09-09 RX ORDER — MELOXICAM 15 MG/1
15 TABLET ORAL DAILY
COMMUNITY

## 2022-09-09 NOTE — ASSESSMENT & PLAN NOTE
Patient with chronic back pain.  Ordering MRI for further evaluation and referring to neurosurgery.  Advised patient to continue the meloxicam and Robaxin.  I have also sent in a prescription for tramadol for any significant pain.  Advised patient potential for addiction well as grogginess or sedation associated with medication.  Instructed not to operate heavy machinery or drive while taking this medication.

## 2022-09-09 NOTE — PROGRESS NOTES
"Chief Complaint  Back Pain    Subjective          Haider Holland presents to [facility]      History of Present Illness     Cory is a 67-year-old male patient of Dr. Cuca Aquino who presents today for ER follow-up.    Has a past medical history of PAD, CAD, hyperlipidemia, hypertension, venous insufficiency, arterial sclerosis, hypertension, type 2 diabetes, obesity, osteoarthritis, neuropathy.     Patient tells me he went to [employer] Tuesday and went to stand up but almost fell. He got to his truck, went home, and then to house to get wife to take him to ER due to back pain severity. Dr. Bill Fontenot evaluated.  He prescribed patient meloxicam and Robaxin.  Patient reports that helps some but he is still in significant amount of pain.  He does have a 30-day supply of the meloxicam and Robaxin.  I will not provide those today.    Imaging reviewed and noted with severe diffuse spondylosis with radiculopathy.  6 lumbar type vertebrae.  Further work-up with MRI should be done to evaluate.  Patient would like [facility] performed at [facility].  Additionally, I am referring to neurosurgery.      Objective     Vital Signs:   /60 (BP Location: Left arm, Patient Position: Sitting, Cuff Size: Large Adult)   Pulse 60   Temp 97.7 °F (36.5 °C) (Oral)   Ht 177.8 cm (70\")   Wt (!) 137 kg (301 lb)   SpO2 97%   BMI 43.19 kg/m²           Physical Exam  Vitals reviewed.   Constitutional:       Appearance: He is well-developed.      Comments: Wearing a face mask     HENT:      Head: Normocephalic and atraumatic.   Eyes:      Conjunctiva/sclera: Conjunctivae normal.   Cardiovascular:      Rate and Rhythm: Normal rate and regular rhythm.      Pulses: Normal pulses.      Heart sounds: Normal heart sounds.   Pulmonary:      Effort: Pulmonary effort is normal.   Musculoskeletal:      Cervical back: Normal range of motion.      Lumbar back: Tenderness present. No swelling, edema, deformity, " signs of trauma, lacerations or spasms. Decreased range of motion. No scoliosis.      Comments: Patient not upright when walking, slightly hunched over due to pain in back.  Deferred SLR due to patient pain while sitting or standing.   Skin:     General: Skin is warm and dry.      Findings: No rash.   Neurological:      Mental Status: He is alert and oriented to person, place, and time.   Psychiatric:         Behavior: Behavior normal.                Result Review :                                   Assessment and Plan      Diagnoses and all orders for this visit:    1. Follow-up exam (Primary)    2. Other spondylosis with radiculopathy, lumbar region  Assessment & Plan:  Patient with chronic back pain.  Ordering MRI for further evaluation and referring to neurosurgery.  Advised patient to continue the meloxicam and Robaxin.  I have also sent in a prescription for tramadol for any significant pain.  Advised patient potential for addiction well as grogginess or sedation associated with medication.  Instructed not to operate heavy machinery or drive while taking this medication.    Orders:  -     traMADol (ULTRAM) 50 MG tablet; Take 1 tablet by mouth Every 8 (Eight) Hours As Needed for Moderate Pain or Severe Pain.  Dispense: 60 tablet; Refill: 0  -     MRI Lumbar Spine Without Contrast; Future  -     Ambulatory Referral to Neurosurgery    3. Chronic midline low back pain, unspecified whether sciatica present  -     traMADol (ULTRAM) 50 MG tablet; Take 1 tablet by mouth Every 8 (Eight) Hours As Needed for Moderate Pain or Severe Pain.  Dispense: 60 tablet; Refill: 0  -     MRI Lumbar Spine Without Contrast; Future  -     Ambulatory Referral to Neurosurgery          Follow Up       No follow-ups on file.      Patient was given instructions and counseling regarding his condition or for health maintenance advice. Please see specific information pulled into the AVS if appropriate.     Mica Castillo, [id number]  EDT  This note has been electronically signed

## 2022-10-05 ENCOUNTER — HOSPITAL ENCOUNTER (OUTPATIENT)
Dept: MRI IMAGING | Facility: HOSPITAL | Age: 67
Discharge: HOME | End: 2022-10-05
Admitting: NURSE PRACTITIONER

## 2022-10-05 DIAGNOSIS — G89.29 CHRONIC MIDLINE LOW BACK PAIN, UNSPECIFIED WHETHER SCIATICA PRESENT: ICD-10-CM

## 2022-10-05 DIAGNOSIS — M54.50 CHRONIC MIDLINE LOW BACK PAIN, UNSPECIFIED WHETHER SCIATICA PRESENT: ICD-10-CM

## 2022-10-05 DIAGNOSIS — M47.26 OTHER SPONDYLOSIS WITH RADICULOPATHY, LUMBAR REGION: ICD-10-CM

## 2022-10-05 PROCEDURE — 72148 MRI LUMBAR SPINE W/O DYE: CPT

## 2022-10-06 NOTE — PROGRESS NOTES
Please notify patient I have his MRI results. MRI shows moderate central canal stenosis in the lumbar spine which is related to disc disease and arthritis.     He has an appt with Dr. Villagran on 10/12/22 who will go over his results in detail and figure out a plan of action for him.

## 2022-10-11 NOTE — PROGRESS NOTES
"Subjective   History of Present Illness: Haider Holland is a 67 y.o. male is being seen for consultation today at the request of Mica COMBS. Today in the office patient reports low back pain into bilateral legs and feet.  Patient complains of pain in his low back that occurs when he has been sitting for a long period of time and goes to stand up.  Pain tends to improve in his low back when he is standing and walking.  The pain does not occur throughout the day unless he is set for a long period of time especially on hard surface.  Patient also complains of pain numbness and tingling in his legs.  The symptoms occur when he is standing or walking.  When he sits down the symptoms will slowly subside.  Denies any radiating pain into his legs.           Previous Treatment: meloxicam,gabapentin,Robaxin,Tramadol    The following portions of the patient's history were reviewed and updated as appropriate: allergies, current medications, past family history, past medical history, past social history, past surgical history and problem list.    Review of Systems   Constitutional: Negative.    HENT: Negative.    Eyes: Negative.    Respiratory: Negative.    Cardiovascular: Negative.    Gastrointestinal: Negative.    Endocrine: Negative.    Genitourinary: Negative.    Musculoskeletal: Positive for back pain (into bilateral legs and feet).   Skin: Negative.    Allergic/Immunologic: Negative.    Neurological: Negative.    Hematological: Negative.    Psychiatric/Behavioral: Positive for sleep disturbance.       Objective     /79   Pulse 78   Ht 177.8 cm (70\")   Wt (!) 139 kg (307 lb)   SpO2 99%   BMI 44.05 kg/m²    Body mass index is 44.05 kg/m².      Neurologic Exam     Mental Status   Oriented to person, place, and time.     Motor Exam     Strength   Strength 5/5 throughout.     Sensory Exam   Patchy distal sensory loss       Assessment & Plan   Independent Review of Radiographic Studies:      I personally " reviewed and interpreted the images from the following studies.    MRI lumbar spine: Mild degenerative changes.  There is moderate central stenosis at L4-5.  No other high-grade central or foraminal stenosis    Medical Decision Making:      Haider Holland is a 67 y.o. male with a history of back pain most severe when sitting for long period of time as well as symptoms concerning for vasogenic claudication in his lower extremities.  MRI shows some degenerative changes and moderate stenosis which could be responsible for his back pain.  He has not tried conservative measures, I recommend he tries a lumbar QING as well as physical therapy.  He is also scheduled to follow-up about his lower extremity issues with his primary care doctor.  Again I think this is vasogenic and not related to his lumbar spine.  We will see him back as needed      Diagnoses and all orders for this visit:    1. Lumbar stenosis without neurogenic claudication (Primary)    2. Lumbar degenerative disc disease    3. Vascular claudication (HCC)      No follow-ups on file.    This patient was examined wearing appropriate personal protective equipment.                      Dr. Cory Villagran IV    10/12/22  11:20 EDT

## 2022-10-12 ENCOUNTER — OFFICE VISIT (OUTPATIENT)
Dept: NEUROSURGERY | Facility: CLINIC | Age: 67
End: 2022-10-12

## 2022-10-12 VITALS
DIASTOLIC BLOOD PRESSURE: 79 MMHG | HEIGHT: 70 IN | HEART RATE: 78 BPM | OXYGEN SATURATION: 99 % | BODY MASS INDEX: 43.95 KG/M2 | SYSTOLIC BLOOD PRESSURE: 153 MMHG | WEIGHT: 307 LBS

## 2022-10-12 DIAGNOSIS — M48.061 LUMBAR STENOSIS WITHOUT NEUROGENIC CLAUDICATION: Primary | ICD-10-CM

## 2022-10-12 DIAGNOSIS — M51.36 LUMBAR DEGENERATIVE DISC DISEASE: ICD-10-CM

## 2022-10-12 DIAGNOSIS — I73.9 VASCULAR CLAUDICATION: ICD-10-CM

## 2022-10-12 PROCEDURE — 99203 OFFICE O/P NEW LOW 30 MIN: CPT | Performed by: NEUROLOGICAL SURGERY

## 2022-10-19 ENCOUNTER — TELEPHONE (OUTPATIENT)
Dept: NEUROSURGERY | Facility: CLINIC | Age: 67
End: 2022-10-19

## 2022-10-19 NOTE — TELEPHONE ENCOUNTER
Attempted to fax records multiple times and realized that the initial message has the phone and fax as the same number so I called the facility and got the correct fax # [phone].    Patient is aware order has been sent over.

## 2022-10-19 NOTE — TELEPHONE ENCOUNTER
Caller: Haider Holland    Relationship: Self    Best call back number: [phone]    What is the medical concern/diagnosis:     What specialty or service is being requested: PHYSICAL THERAPY    What is the provider, practice or medical service name: [facility]    What is the office location: [address] [place] [address]    What is the office phone number: [phone]  FAX: [phone]    Any additional details: PT IS ASKING TO HAVE ORDER FAXED TO [facility]   FAX ORDER [phone]  PLEASE NOTIFY PT WHEN THIS HAS BEEN DONE

## 2022-10-24 ENCOUNTER — OFFICE VISIT (OUTPATIENT)
Dept: PODIATRY | Facility: CLINIC | Age: 67
End: 2022-10-24

## 2022-10-24 VITALS — OXYGEN SATURATION: 95 % | WEIGHT: 307 LBS | BODY MASS INDEX: 43.95 KG/M2 | HEART RATE: 82 BPM | HEIGHT: 70 IN

## 2022-10-24 DIAGNOSIS — E11.65 TYPE 2 DIABETES MELLITUS WITH HYPERGLYCEMIA, WITH LONG-TERM CURRENT USE OF INSULIN: ICD-10-CM

## 2022-10-24 DIAGNOSIS — R09.89 DECREASED PULSES IN FEET: ICD-10-CM

## 2022-10-24 DIAGNOSIS — E11.42 DM TYPE 2 WITH DIABETIC PERIPHERAL NEUROPATHY: Primary | ICD-10-CM

## 2022-10-24 DIAGNOSIS — Z79.4 TYPE 2 DIABETES MELLITUS WITH HYPERGLYCEMIA, WITH LONG-TERM CURRENT USE OF INSULIN: ICD-10-CM

## 2022-10-24 PROCEDURE — 99212 OFFICE O/P EST SF 10 MIN: CPT

## 2022-10-24 NOTE — PROGRESS NOTES
"10/24/2022  Foot and Ankle Surgery - Established Patient/Follow-up  Provider: LAURYN Wolfe   Location: [facility]    Subjective:  Haider Holland is a 67 y.o. male.     Chief Complaint   Patient presents with   • Right Foot - Follow-up, Diabetes     [facility]   • Left Foot - Follow-up, Diabetes     [facility]   • Follow-up     DENISSE Presley 06/10/2022       The patient is a 67-year-old male who presents for routine diabetic foot evaluation.     The patient states he was not informed of his WENDI test results. He reports his blood glucose has been ranging from approximately 90 mg/dL to 130 mg/dL. He notes he is scheduled for a follow-up with his endocrinologist in 01/2023. The patient denies trimming his toenails at home. He states his 2 kittens believe his feet are play toys, but his scratches are healed today. He adds that he utilizes a machine from [employer] that rotates water on his feet every other day. The patient dries his feet thoroughly and subsequently moisturizes them. He wears house shoes around the house, but confirms owning a pair of diabetic shoes that he wears when leaving the house.     The patient reports dropping an iron skillet on his hallux approximately 2 months ago and he reports continued intermittent pain that he describes as \"someone cutting it open.\" He adds that his hallux \"catches\" on the rug occasionally and \"goes back this way\" instead of \"going like it is supposed to.\" The patient reports a history of arthritis in his feet.     He notes being claustrophobic during an MRI scan of his back which revealed arthritis in his lumbar region. He was given a muscle relaxer and pain medication for his low back pain.    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     No Known Allergies    Current Outpatient Medications on File Prior to Visit   Medication Sig Dispense Refill   • ammonium lactate (Lac-Hydrin) 12 % cream Apply  topically to the appropriate " "area as directed Daily. Both lower legs 385 g 2   • atorvastatin (Lipitor) 10 MG tablet Take 1 tablet by mouth Daily. 90 tablet 3   • azelastine (OPTIVAR) 0.05 % ophthalmic solution INSTILL 1 DROP INTO EACH EYE 1 TO 2 TIMES DAILY     • bumetanide (BUMEX) 1 MG tablet Take 1 tablet by mouth once daily 90 tablet 0   • Canagliflozin (Invokana) 100 MG tablet tablet Take 1 tablet by mouth Daily. 90 tablet 3   • gabapentin (NEURONTIN) 600 MG tablet Take 1 tablet by mouth 4 times daily 120 tablet 2   • glimepiride (AMARYL) 2 MG tablet Take 1 tablet by mouth 2 (Two) Times a Day. 180 tablet 3   • glucose blood (FREESTYLE LITE) test strip USE 1 STRIP TO CHECK GLUCOSE TWICE DAILY 300 each 0   • Insulin Lispro Prot & Lispro (humaLOG 75-25) (75-25) 100 UNIT/ML suspension pen-injector pen Inject 25 Units under the skin into the appropriate area as directed 2 (Two) Times a Day With Meals. 15 mL 10   • Insulin Pen Needle (BD Pen Needle Supriya U/F) 32G X 4 MM misc Use as directed to inject insulin twice daily 100 each 3   • lisinopril (PRINIVIL,ZESTRIL) 20 MG tablet Take 1 tablet by mouth Daily. 90 tablet 3   • meloxicam (MOBIC) 15 MG tablet Take 15 mg by mouth Daily.     • metFORMIN (GLUCOPHAGE) 1000 MG tablet Take 1,000 mg by mouth 2 (Two) Times a Day With Meals.     • methocarbamol (ROBAXIN) 750 MG tablet Take 750 mg by mouth 3 (Three) Times a Day.     • mupirocin (Bactroban) 2 % ointment Apply  topically to the appropriate area as directed 2 (Two) Times a Day. 30 g 2   • SITagliptin-metFORMIN HCl ER (JANUMET XR)  MG tablet Take 2 tablets by mouth Daily. 60 tablet 5   • traMADol (ULTRAM) 50 MG tablet Take 1 tablet by mouth Every 8 (Eight) Hours As Needed for Moderate Pain or Severe Pain. 60 tablet 0     No current facility-administered medications on file prior to visit.       Objective   Pulse 82   Ht 177.8 cm (70\")   Wt (!) 139 kg (307 lb)   SpO2 95%   BMI 44.05 kg/m²     Foot/Ankle Exam:       General:   Diabetic Foot " Exam Performed    Appearance: appears stated age and healthy and disheveled    Orientation: AAOx3    Affect: appropriate    Gait: unimpaired    Assistance: cane    Shoe Gear:  Casual shoes (house shoes)    VASCULAR      Right Foot Vascularity   Dorsalis pedis:  1+ (Faint)  Skin Temperature: warm    Edema Grading:  None  CFT:  < 3 seconds  Pedal Hair Growth:  Absent  Varicosities: none       Left Foot Vascularity   Dorsalis pedis:  1+ (Faint)  Skin Temperature: warm    Edema Grading:  None  CFT:  < 3 seconds  Pedal Hair Growth:  Absent      NEUROLOGIC     Right Foot Neurologic   Light touch sensation:  Diminished  Protective Sensation using [place]-Te Monofilament:  0     Left Foot Neurologic   Light touch sensation:  Diminished  Protective Sensation using [place]-Te Monofilament:  0     MUSCULOSKELETAL      Right Foot Musculoskeletal   Ecchymosis:  None  Tenderness: none       Left Foot Musculoskeletal   Ecchymosis:  None  Tenderness: none       DERMATOLOGIC     Right Foot Dermatologic   Skin: dry skin and skin changes    Nails: onychomycosis, abnormally thick, subungual debris and dystrophic nails       Left Foot Dermatologic   Skin: dry skin and skin changes    Nails: onychomycosis, abnormally thick, subungual debris and dystrophic nails        Right Foot Additional Comments 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   He is utilizing a cane to ambulate today.  Decreased sensation to his feet bilaterally with 0 out of 10 noted on the [place]-Te monofilament test.   Chronic skin changes to feet bilaterally. Dry skin to bilateral feet.   Poor hygiene to bilateral feet.      Left Foot Additional Comments: 10/24/2022  Diabetic foot evaluation was performed today.   He is utilizing a cane to ambulate today.  Decreased sensation to his feet bilaterally with 0 out of 10 noted on the [place]-Te monofilament test.   Chronic skin  changes to feet bilaterally. Dry skin to bilateral feet.   Poor hygiene to bilateral feet.    RESULTS REVIEW  Doppler Ankle Brachial Index Single Level CAR (09/01/2022 14:32)      Assessment & Plan   Diagnoses and all orders for this visit:    1. DM type 2 with diabetic peripheral neuropathy (HCC) (Primary)    2. Type 2 diabetes mellitus with hyperglycemia, with long-term current use of insulin (HCC)    3. Decreased pulses in feet      1. Neuropathy  2. Diabetes mellitus type 2    The patient is a 67-year-old male who presents for routine diabetic foot evaluation. Do feel patient is at a moderate-to-high risk of pedal complications secondary to diabetes mellitus type 2. Explained importance of diabetic foot care, daily foot checks, and glycemic control. Patient should check both feet on a daily basis, monitor and control blood sugars, make sure that both feet and in between toes are towel dried after baths or showers. Avoid barefoot walking at all times. Check shoes before putting them on. Patient was given information on proper foot care. Call the office at the first signs of a wound or with signs of infection. Will give patient a list of moisturizers today. Do feel patient has signs and symptoms of neuropathy. Discussed pathophysiology with the patient today. Reviewed the importance of wearing shoes or protective footwear at all times. WENDI results since previous examination were reviewed today. The patient will follow up in 3 months for routine diabetic foot evaluation and call with any questions or concerns should they arise prior to his next appointment.     No orders of the defined types were placed in this encounter.       Transcribed from ambient dictation for LAURYN Vo by Henna Miles.  10/24/22   12:34 EDT    Patient or patient representative verbalized consent to the visit recording.  I have personally performed the services described in this document as transcribed by the above  individual, and it is both accurate and complete.  Glenys Birch, APRN  10/24/2022  14:05 EDT

## 2022-10-27 DIAGNOSIS — E11.65 UNCONTROLLED TYPE 2 DIABETES MELLITUS WITH HYPERGLYCEMIA: ICD-10-CM

## 2022-10-27 RX ORDER — BLOOD-GLUCOSE METER
KIT MISCELLANEOUS
Qty: 300 EACH | Refills: 3 | Status: SHIPPED | OUTPATIENT
Start: 2022-10-27

## 2022-10-31 RX ORDER — SITAGLIPTIN AND METFORMIN HYDROCHLORIDE 1000; 50 MG/1; MG/1
TABLET, FILM COATED, EXTENDED RELEASE ORAL
Qty: 180 TABLET | Refills: 0 | Status: SHIPPED | OUTPATIENT
Start: 2022-10-31 | End: 2023-01-30

## 2022-12-12 ENCOUNTER — OFFICE VISIT (OUTPATIENT)
Dept: FAMILY MEDICINE CLINIC | Facility: CLINIC | Age: 67
End: 2022-12-12

## 2022-12-12 VITALS
RESPIRATION RATE: 18 BRPM | DIASTOLIC BLOOD PRESSURE: 78 MMHG | HEART RATE: 84 BPM | BODY MASS INDEX: 42.83 KG/M2 | TEMPERATURE: 98.7 F | OXYGEN SATURATION: 99 % | HEIGHT: 70 IN | WEIGHT: 299.2 LBS | SYSTOLIC BLOOD PRESSURE: 128 MMHG

## 2022-12-12 DIAGNOSIS — Z00.00 PHYSICAL EXAM, ANNUAL: Primary | ICD-10-CM

## 2022-12-12 PROCEDURE — 1170F FXNL STATUS ASSESSED: CPT | Performed by: FAMILY MEDICINE

## 2022-12-12 PROCEDURE — G0439 PPPS, SUBSEQ VISIT: HCPCS | Performed by: FAMILY MEDICINE

## 2022-12-12 PROCEDURE — 1126F AMNT PAIN NOTED NONE PRSNT: CPT | Performed by: FAMILY MEDICINE

## 2022-12-12 PROCEDURE — 1159F MED LIST DOCD IN RCRD: CPT | Performed by: FAMILY MEDICINE

## 2022-12-12 PROCEDURE — T1015 CLINIC SERVICE: HCPCS | Performed by: FAMILY MEDICINE

## 2022-12-12 RX ORDER — ATORVASTATIN CALCIUM 10 MG/1
10 TABLET, FILM COATED ORAL NIGHTLY
COMMUNITY
End: 2023-01-03

## 2022-12-12 NOTE — PROGRESS NOTES
The ABCs of the Annual Wellness Visit  Subsequent Medicare Wellness Visit    Subjective    Haider Holland is a 67 y.o. male who presents for a Subsequent Medicare Wellness Visit.    The following portions of the patient's history were reviewed and   updated as appropriate: allergies, current medications, past family history, past medical history, past social history, past surgical history and problem list.    Compared to one year ago, the patient feels his physical   health is better.    Compared to one year ago, the patient feels his mental   health is the same.    Recent Hospitalizations:  {Hospital Admission Status in the last 365 days:85434}      Current Medical Providers:  Patient Care Team:  Cuca Aquino MD as PCP - General (Family Medicine)    Outpatient Medications Prior to Visit   Medication Sig Dispense Refill   • ammonium lactate (Lac-Hydrin) 12 % cream Apply  topically to the appropriate area as directed Daily. Both lower legs 385 g 2   • atorvastatin (LIPITOR) 10 MG tablet Take 10 mg by mouth Every Night.     • azelastine (OPTIVAR) 0.05 % ophthalmic solution INSTILL 1 DROP INTO EACH EYE 1 TO 2 TIMES DAILY     • bumetanide (BUMEX) 1 MG tablet Take 1 tablet by mouth once daily 90 tablet 0   • Canagliflozin (Invokana) 100 MG tablet tablet Take 1 tablet by mouth Daily. 90 tablet 3   • FREESTYLE LITE test strip USE 1 STRIP TO CHECK GLUCOSE TWICE DAILY 300 each 3   • gabapentin (NEURONTIN) 600 MG tablet Take 1 tablet by mouth 4 times daily 120 tablet 2   • glimepiride (AMARYL) 2 MG tablet Take 1 tablet by mouth 2 (Two) Times a Day. 180 tablet 3   • Insulin Lispro Prot & Lispro (humaLOG 75-25) (75-25) 100 UNIT/ML suspension pen-injector pen Inject 25 Units under the skin into the appropriate area as directed 2 (Two) Times a Day With Meals. 15 mL 10   • Insulin Pen Needle (BD Pen Needle Supriya U/F) 32G X 4 MM misc Use as directed to inject insulin twice daily 100 each 3   • Janumet XR  MG tablet  Take 2 tablets by mouth once daily 180 tablet 0   • lisinopril (PRINIVIL,ZESTRIL) 20 MG tablet Take 1 tablet by mouth Daily. 90 tablet 3   • meloxicam (MOBIC) 15 MG tablet Take 15 mg by mouth Daily.     • methocarbamol (ROBAXIN) 750 MG tablet Take 1,500 mg by mouth 3 (Three) Times a Day.     • mupirocin (Bactroban) 2 % ointment Apply  topically to the appropriate area as directed 2 (Two) Times a Day. 30 g 2   • traMADol (ULTRAM) 50 MG tablet Take 1 tablet by mouth Every 8 (Eight) Hours As Needed for Moderate Pain or Severe Pain. 60 tablet 0   • metFORMIN (GLUCOPHAGE) 1000 MG tablet Take 1,000 mg by mouth 2 (Two) Times a Day With Meals.       No facility-administered medications prior to visit.       Opioid medication/s are on active medication list.  and I have evaluated his active treatment plan and pain score trends (see table).  Vitals:    12/12/22 1141   PainSc: 0-No pain     I have reviewed the chart for potential of high risk medication and harmful drug interactions in the elderly.            Aspirin is not on active medication list.  {ASPIRIN NOT ON MEDICATION LIST INDICATED/NOT INDICATED:17127}.    Patient Active Problem List   Diagnosis   • Abnormal liver function tests   • Body mass index (BMI) of 40.0-44.9 in adult ([facility])   • Coronary artery disease   • Diabetic peripheral neuropathy associated with type 2 diabetes mellitus ([facility])   • Edema of lower extremity   • Mixed hyperlipidemia   • Hypertension, benign   • Neuropathy   • Type 2 diabetes mellitus with hyperglycemia, with long-term current use of insulin ([facility])   • PAD (peripheral artery disease) ([facility])   • History of colon polyps   • Mild nonproliferative diabetic retinopathy without macular edema associated with type 2 diabetes mellitus (HCC)   • 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  {ACP Discussion, Advance Directive not in EMR:80148}     Objective    Vitals:    12/12/22 1141   BP: 128/78   BP Location: Left arm   Patient Position: Sitting   Cuff Size: Large Adult   Pulse: 84   Resp: 18   Temp: 98.7 °F (37.1 °C)   TempSrc: Infrared   SpO2: 99%   Weight: 136 kg (299 lb 3.2 oz)   Height: 177.8 cm (70\")   PainSc: 0-No pain     Estimated body mass index is 42.93 kg/m² as calculated from the following:    Height as of this encounter: 177.8 cm (70\").    Weight as of this encounter: 136 kg (299 lb 3.2 oz).    {Class 3 Severe Obesity (BMI >=40).:7614105307}      Does the patient have evidence of cognitive impairment? {Yes/No:56798}          HEALTH RISK ASSESSMENT    Smoking Status:  Social History     Tobacco Use   Smoking Status Never   • Passive exposure: Never   Smokeless Tobacco Never     Alcohol Consumption:  Social History     Substance and Sexual Activity   Alcohol Use No     Fall Risk Screen:    STEADI Fall Risk Assessment was completed, and patient is at MODERATE risk for falls. Assessment completed on:12/12/2022    Depression Screening:  PHQ-2/PHQ-9 Depression Screening 12/12/2022   Retired PHQ-9 Total Score -   Retired Total Score -   Little Interest or Pleasure in Doing Things 0-->not at all   Feeling Down, Depressed or Hopeless 1-->several days   PHQ-9: Brief Depression Severity Measure Score 1       Health Habits and Functional and Cognitive Screening:  Functional & Cognitive Status 12/12/2022   Do you have difficulty preparing food and eating? No   Do you have difficulty bathing yourself, getting dressed or grooming " yourself? No   Do you have difficulty using the toilet? No   Do you have difficulty moving around from place to place? Yes   Do you have trouble with steps or getting out of a bed or a chair? Yes   Current Diet Well Balanced Diet   Dental Exam Not up to date   Eye Exam Up to date   Exercise (times per week) 0 times per week   Current Exercises Include No Regular Exercise   Do you need help using the phone?  No   Are you deaf or do you have serious difficulty hearing?  Yes   Do you need help with transportation? No   Do you need help shopping? No   Do you need help preparing meals?  No   Do you need help with housework?  No   Do you need help with laundry? No   Do you need help taking your medications? No   Do you need help managing money? No   Do you ever drive or ride in a car without wearing a seat belt? No   Have you felt unusual stress, anger or loneliness in the last month? No   Who do you live with? Spouse   If you need help, do you have trouble finding someone available to you? No   Have you been bothered in the last four weeks by sexual problems? No   Do you have difficulty concentrating, remembering or making decisions? Yes       Age-appropriate Screening Schedule:  Refer to the list below for future screening recommendations based on patient's age, sex and/or medical conditions. Orders for these recommended tests are listed in the plan section. The patient has been provided with a written plan.    Health Maintenance   Topic Date Due   • TDAP/TD VACCINES (1 - Tdap) Never done   • ZOSTER VACCINE (2 of 2) 07/05/2022   • HEMOGLOBIN A1C  11/19/2022   • INFLUENZA VACCINE  03/31/2023 (Originally 8/1/2022)   • DIABETIC EYE EXAM  05/15/2023   • LIPID PANEL  05/19/2023   • URINE MICROALBUMIN  05/19/2023   • DIABETIC FOOT EXAM  10/24/2023                CMS Preventative Services Quick Reference  Risk Factors Identified During Encounter  {Medicare Wellness Risk Factors:89578}  The above risks/problems have been  "discussed with the patient.  Pertinent information has been shared with the patient in the After Visit Summary.  An After Visit Summary and PPPS were made available to the patient.    Follow Up:   Next Medicare Wellness visit to be scheduled in 1 year.   {Wrapup  Review (Popup)  Advance Care Planning  Labs  CC  Problem List  Visit Diagnosis  Medications  Result Review  Imaging  Health Maintenance  Quality  BestPractice  SmartSets  SnapShot  Encounters  Notes  Media  Procedures :23}    Additional E&M Note during same encounter follows:  Patient has multiple medical problems which are significant and separately identifiable that require additional work above and beyond the Medicare Wellness Visit.      Chief Complaint  Medicare Wellness-subsequent    Subjective    {Problem List  Visit Diagnosis   Encounters  Notes  Medications  Labs  Result Review Imaging  Media :23}    HPI  Haider Holland is also being seen today for ***         Objective   Vital Signs:  /78 (BP Location: Left arm, Patient Position: Sitting, Cuff Size: Large Adult)   Pulse 84   Temp 98.7 °F (37.1 °C) (Infrared)   Resp 18   Ht 177.8 cm (70\")   Wt 136 kg (299 lb 3.2 oz)   SpO2 99%   BMI 42.93 kg/m²     Physical Exam     {The following data was reviewed by (Optional):79356}  {Ambulatory Labs (Optional):92046}  {Data reviewed (Optional):77092:::1}           Assessment and Plan {CC Problem List  Visit Diagnosis   ROS  Review (Popup)  Health [facility]  Quality  BestPractice  Medications  SmartSets  SnapShot Encounters  Media :23}  Diagnoses and all orders for this visit:    1. Physical exam, annual (Primary)           {Time Spent (Optional):45259}  Follow Up {Instructions Charge Capture  Follow-up Communications :23}  No follow-ups on file.  Patient was given instructions and counseling regarding his condition or for health maintenance advice. Please see specific information pulled into the AVS if " appropriate.

## 2022-12-12 NOTE — PROGRESS NOTES
"The ABCs of the Annual Wellness Visit  Subsequent Medicare Wellness Visit    Subjective    {Wrapup  Review (Popup)  Advance Care Planning  Labs  CC  Problem List  Visit Diagnosis  Medications  Result Review  Imaging  [facility]  [employer]  [facility]  Snap[employer]  Encounters  Notes  Media  Procedures :23}  Haider Holland is a 67 y.o. male who presents for a Subsequent Medicare Wellness Visit.    The following portions of the patient's history were reviewed and   updated as appropriate: {history reviewed:20406::\"allergies\",\"current medications\",\"past family history\",\"past medical history\",\"past social history\",\"past surgical history\",\"problem list\"}.    Compared to one year ago, the patient feels his physical   health is {better worse same:09863}.    Compared to one year ago, the patient feels his mental   health is {better worse same:16301}.    Recent Hospitalizations:  {Hospital Admission Status in the last 365 days:79550}      Current Medical Providers:  Patient Care Team:  Cuca Aquino MD as PCP - General (Family Medicine)    Outpatient Medications Prior to Visit   Medication Sig Dispense Refill   • ammonium lactate (Lac-Hydrin) 12 % cream Apply  topically to the appropriate area as directed Daily. Both lower legs 385 g 2   • atorvastatin (LIPITOR) 10 MG tablet Take 10 mg by mouth Every Night.     • azelastine (OPTIVAR) 0.05 % ophthalmic solution INSTILL 1 DROP INTO EACH EYE 1 TO 2 TIMES DAILY     • bumetanide (BUMEX) 1 MG tablet Take 1 tablet by mouth once daily 90 tablet 0   • Canagliflozin (Invokana) 100 MG tablet tablet Take 1 tablet by mouth Daily. 90 tablet 3   • FREESTYLE LITE test strip USE 1 STRIP TO CHECK GLUCOSE TWICE DAILY 300 each 3   • gabapentin (NEURONTIN) 600 MG tablet Take 1 tablet by mouth 4 times daily 120 tablet 2   • glimepiride (AMARYL) 2 MG tablet Take 1 tablet by mouth 2 (Two) Times a Day. 180 tablet 3   • Insulin Lispro Prot & Lispro (humaLOG " 75-25) (75-25) 100 UNIT/ML suspension pen-injector pen Inject 25 Units under the skin into the appropriate area as directed 2 (Two) Times a Day With Meals. 15 mL 10   • Insulin Pen Needle (BD Pen Needle Supriya U/F) 32G X 4 MM misc Use as directed to inject insulin twice daily 100 each 3   • Janumet XR  MG tablet Take 2 tablets by mouth once daily 180 tablet 0   • lisinopril (PRINIVIL,ZESTRIL) 20 MG tablet Take 1 tablet by mouth Daily. 90 tablet 3   • meloxicam (MOBIC) 15 MG tablet Take 15 mg by mouth Daily.     • methocarbamol (ROBAXIN) 750 MG tablet Take 1,500 mg by mouth 3 (Three) Times a Day.     • mupirocin (Bactroban) 2 % ointment Apply  topically to the appropriate area as directed 2 (Two) Times a Day. 30 g 2   • traMADol (ULTRAM) 50 MG tablet Take 1 tablet by mouth Every 8 (Eight) Hours As Needed for Moderate Pain or Severe Pain. 60 tablet 0   • metFORMIN (GLUCOPHAGE) 1000 MG tablet Take 1,000 mg by mouth 2 (Two) Times a Day With Meals.       No facility-administered medications prior to visit.       Opioid medication/s are on active medication list.  and I have evaluated his active treatment plan and pain score trends (see table).  Vitals:    12/12/22 1141   PainSc: 0-No pain     I have reviewed the chart for potential of high risk medication and harmful drug interactions in the elderly.            Aspirin is not on active medication list.  {ASPIRIN NOT ON MEDICATION LIST INDICATED/NOT INDICATED:88977}.    Patient Active Problem List   Diagnosis   • Abnormal liver function tests   • Body mass index (BMI) of 40.0-44.9 in adult ([facility])   • Coronary artery disease   • Diabetic peripheral neuropathy associated with type 2 diabetes mellitus ([facility])   • Edema of lower extremity   • Mixed hyperlipidemia   • Hypertension, benign   • Neuropathy   • Type 2 diabetes mellitus with hyperglycemia, with long-term current use of insulin ([facility])   • PAD (peripheral artery disease) ([facility])   • History of colon polyps   • Mild  "nonproliferative diabetic retinopathy without macular edema associated with type 2 diabetes mellitus ([facility])   • 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  {ACP Discussion, Advance Directive not in EMR:79379}     Objective    Vitals:    12/12/22 1141   BP: 128/78   BP Location: Left arm   Patient Position: Sitting   Cuff Size: Large Adult   Pulse: 84   Resp: 18   Temp: 98.7 °F (37.1 °C)   TempSrc: Infrared   SpO2: 99%   Weight: 136 kg (299 lb 3.2 oz)   Height: 177.8 cm (70\")   PainSc: 0-No pain     Estimated body mass index is 42.93 kg/m² as calculated from the following:    Height as of this encounter: 177.8 cm (70\").    Weight as of this encounter: 136 kg (299 lb 3.2 oz).    {Class 3 Severe Obesity (BMI >=40).:7812937392}      Does the patient have evidence of cognitive impairment?   {Yes/No:04133}            HEALTH RISK ASSESSMENT    Smoking Status:  Social History     Tobacco Use   Smoking Status Never   • Passive exposure: Never   Smokeless Tobacco Never     Alcohol Consumption:  Social History     Substance and Sexual Activity   Alcohol Use No     Fall Risk Screen:    STEADI Fall Risk Assessment was completed, and patient is at MODERATE risk for falls. Assessment completed on:12/12/2022    Depression Screening:  PHQ-2/PHQ-9 Depression Screening 12/12/2022   Retired PHQ-9 Total Score -   Retired Total Score -   Little Interest or Pleasure in Doing Things 0-->not at all   Feeling " Down, Depressed or Hopeless 1-->several days   PHQ-9: Brief Depression Severity Measure Score 1       Health Habits and Functional and Cognitive Screening:  Functional & Cognitive Status 12/12/2022   Do you have difficulty preparing food and eating? No   Do you have difficulty bathing yourself, getting dressed or grooming yourself? No   Do you have difficulty using the toilet? No   Do you have difficulty moving around from place to place? Yes   Do you have trouble with steps or getting out of a bed or a chair? Yes   Current Diet Well Balanced Diet   Dental Exam Not up to date   Eye Exam Up to date   Exercise (times per week) 0 times per week   Current Exercises Include No Regular Exercise   Do you need help using the phone?  No   Are you deaf or do you have serious difficulty hearing?  Yes   Do you need help with transportation? No   Do you need help shopping? No   Do you need help preparing meals?  No   Do you need help with housework?  No   Do you need help with laundry? No   Do you need help taking your medications? No   Do you need help managing money? No   Do you ever drive or ride in a car without wearing a seat belt? No   Have you felt unusual stress, anger or loneliness in the last month? No   Who do you live with? Spouse   If you need help, do you have trouble finding someone available to you? No   Have you been bothered in the last four weeks by sexual problems? No   Do you have difficulty concentrating, remembering or making decisions? Yes       Age-appropriate Screening Schedule:  Refer to the list below for future screening recommendations based on patient's age, sex and/or medical conditions. Orders for these recommended tests are listed in the plan section. The patient has been provided with a written plan.    Health Maintenance   Topic Date Due   • TDAP/TD VACCINES (1 - Tdap) Never done   • ZOSTER VACCINE (2 of 2) 07/05/2022   • HEMOGLOBIN A1C  11/19/2022   • INFLUENZA VACCINE  03/31/2023 (Originally  8/1/2022)   • DIABETIC EYE EXAM  05/15/2023   • LIPID PANEL  05/19/2023   • URINE MICROALBUMIN  05/19/2023   • DIABETIC FOOT EXAM  10/24/2023                CMS Preventative Services Quick Reference  Risk Factors Identified During Encounter:    {Medicare Wellness Risk Factors:37803}    The above risks/problems have been discussed with the patient.  Pertinent information has been shared with the patient in the After Visit Summary.    There are no diagnoses linked to this encounter.    Follow Up:   Next Medicare Wellness visit to be scheduled in 1 year.      An After Visit Summary and PPPS were made available to the patient.

## 2023-01-03 RX ORDER — INSULIN LISPRO 100 [IU]/ML
INJECTION, SUSPENSION SUBCUTANEOUS
Qty: 15 ML | Refills: 0 | OUTPATIENT
Start: 2023-01-03

## 2023-01-03 RX ORDER — ATORVASTATIN CALCIUM 10 MG/1
TABLET, FILM COATED ORAL
Qty: 90 TABLET | Refills: 1 | Status: SHIPPED | OUTPATIENT
Start: 2023-01-03

## 2023-01-03 NOTE — TELEPHONE ENCOUNTER
Yes, I will refill request for his diabetes medicine should go to his endocrinologist who is treating his diabetes.  I have indicated this to the pharmacist and ask them to send the prescription refill to Dr. Allison.  Thanks

## 2023-01-05 RX ORDER — INSULIN LISPRO 100 [IU]/ML
25 INJECTION, SUSPENSION SUBCUTANEOUS 2 TIMES DAILY WITH MEALS
Qty: 15 ML | Refills: 5 | Status: SHIPPED | OUTPATIENT
Start: 2023-01-05

## 2023-01-10 LAB
ALBUMIN SERPL-MCNC: 3.8 G/DL (ref 3.8–4.8)
ALBUMIN/CREAT UR: <7 MG/G CREAT (ref 0–29)
ALBUMIN/GLOB SERPL: 1.5 {RATIO} (ref 1.2–2.2)
ALP SERPL-CCNC: 79 IU/L (ref 44–121)
ALT SERPL-CCNC: 22 IU/L (ref 0–44)
AMBIG ABBREV CMP14 DEFAULT: NORMAL
AMBIG ABBREV LP DEFAULT: NORMAL
AST SERPL-CCNC: 26 IU/L (ref 0–40)
BILIRUB SERPL-MCNC: 0.9 MG/DL (ref 0–1.2)
BUN SERPL-MCNC: 12 MG/DL (ref 8–27)
BUN/CREAT SERPL: 13 (ref 10–24)
CALCIUM SERPL-MCNC: 8.9 MG/DL (ref 8.6–10.2)
CHLORIDE SERPL-SCNC: 111 MMOL/L (ref 96–106)
CHOLEST SERPL-MCNC: 91 MG/DL (ref 100–199)
CO2 SERPL-SCNC: 24 MMOL/L (ref 20–29)
CREAT SERPL-MCNC: 0.89 MG/DL (ref 0.76–1.27)
CREAT UR-MCNC: 41 MG/DL
EGFRCR SERPLBLD CKD-EPI 2021: 94 ML/MIN/1.73
GLOBULIN SER CALC-MCNC: 2.6 G/DL (ref 1.5–4.5)
GLUCOSE SERPL-MCNC: 86 MG/DL (ref 70–99)
HBA1C MFR BLD: 6 % (ref 4.8–5.6)
HDLC SERPL-MCNC: 37 MG/DL
LDLC SERPL CALC-MCNC: 38 MG/DL (ref 0–99)
MICROALBUMIN UR-MCNC: <3 UG/ML
POTASSIUM SERPL-SCNC: 4.6 MMOL/L (ref 3.5–5.2)
PROT SERPL-MCNC: 6.4 G/DL (ref 6–8.5)
SODIUM SERPL-SCNC: 144 MMOL/L (ref 134–144)
TRIGL SERPL-MCNC: 77 MG/DL (ref 0–149)
VLDLC SERPL CALC-MCNC: 16 MG/DL (ref 5–40)

## 2023-01-11 ENCOUNTER — DOCUMENTATION (OUTPATIENT)
Dept: ENDOCRINOLOGY | Facility: CLINIC | Age: 68
End: 2023-01-11
Payer: MEDICARE

## 2023-01-11 NOTE — PROGRESS NOTES
Benefits Investigation Summary    Prescription: Renewal     Dispensing pharmacy: [facility]    Copay amount: Invokana-$0/90 day  Janumet-$0/90 day    PLAN: Juanita  BIN: [id number]  PCN: [id number]  RX GROUP: [id number]    Prior Auth and Med Assistance notes: none    Jen Martin CPhT

## 2023-01-16 ENCOUNTER — OFFICE VISIT (OUTPATIENT)
Dept: ENDOCRINOLOGY | Facility: CLINIC | Age: 68
End: 2023-01-16
Payer: MEDICARE

## 2023-01-16 VITALS
DIASTOLIC BLOOD PRESSURE: 75 MMHG | SYSTOLIC BLOOD PRESSURE: 135 MMHG | WEIGHT: 299 LBS | BODY MASS INDEX: 42.8 KG/M2 | OXYGEN SATURATION: 98 % | HEART RATE: 80 BPM | HEIGHT: 70 IN

## 2023-01-16 DIAGNOSIS — I10 HYPERTENSION, BENIGN: ICD-10-CM

## 2023-01-16 DIAGNOSIS — E11.42 DIABETIC PERIPHERAL NEUROPATHY: ICD-10-CM

## 2023-01-16 DIAGNOSIS — Z79.4 TYPE 2 DIABETES MELLITUS WITH HYPERGLYCEMIA, WITH LONG-TERM CURRENT USE OF INSULIN: Primary | ICD-10-CM

## 2023-01-16 DIAGNOSIS — E11.65 TYPE 2 DIABETES MELLITUS WITH HYPERGLYCEMIA, WITH LONG-TERM CURRENT USE OF INSULIN: Primary | ICD-10-CM

## 2023-01-16 DIAGNOSIS — E78.2 MIXED HYPERLIPIDEMIA: ICD-10-CM

## 2023-01-16 LAB — GLUCOSE BLDC GLUCOMTR-MCNC: 63 MG/DL (ref 70–105)

## 2023-01-16 PROCEDURE — 82962 GLUCOSE BLOOD TEST: CPT | Performed by: INTERNAL MEDICINE

## 2023-01-16 PROCEDURE — 99214 OFFICE O/P EST MOD 30 MIN: CPT | Performed by: INTERNAL MEDICINE

## 2023-01-16 PROCEDURE — 3044F HG A1C LEVEL LT 7.0%: CPT | Performed by: INTERNAL MEDICINE

## 2023-01-16 RX ORDER — CANAGLIFLOZIN 100 MG/1
TABLET, FILM COATED ORAL
Qty: 90 TABLET | Refills: 0 | Status: SHIPPED | OUTPATIENT
Start: 2023-01-16

## 2023-01-16 NOTE — PROGRESS NOTES
Endocrine Progress Note Outpatient     Patient Care Team:  Cuca Aquino MD as PCP - General (Family Medicine)    Chief Complaint: Follow up type 2 diabetes    HPI: 67-year-old male with history of type 2 diabetes, hypertension, hyperlipidemia and obesity is here for follow-up.    For type 2 diabetes he is currently on Janumet XR 50/thousand, 2 tablets p.o. daily, glimepiride 2 mg twice a day, Invokana 100 medium daily and Humalog mix 75/25, 20 units twice a day.  He did bring in blood sugar records for review and they are running mostly between 100-160.  He does not have low blood sugars.  Is trying his best for his diet.  He has not gotten flu or pneumonia vaccine.      Hypertension: Well-controlled.    Hyperlipidemia: On atorvastatin.    Obesity: He is advised to continue to work on his diet and activity.    Diabetic peripheral neuropathy: Stable.     Past Medical History:   Diagnosis Date   • Abnormal liver function tests 09/27/2017   • Bilateral lower extremity edema 02/05/2019   • BMI 40.0-44.9, adult ([facility]) 02/05/2019   • CAD (coronary artery disease) 10/24/2017   • Cellulitis of leg, right 04/02/2019   • Chest pain 10/24/2017   • Cough 02/06/2018   • Diabetes mellitus, type II ([facility]) 10/24/2017   • Hyperlipidemia 09/27/2017   • Hypertension, benign 09/27/2017   • MI (myocardial infarction) ([facility]) 2008   • Neuropathy 08/07/2018   • Peripheral neuropathy    • Screening for colon cancer 02/05/2019   • Screening PSA (prostate specific antigen) 02/05/2019   • Skin abscess 09/18/2018   • Sore throat 02/06/2018   • Special screening for malignant neoplasm of prostate 11/30/2017   • Type 2 diabetes, uncontrolled, with neuropathy 09/27/2017    DM typeII with peripheral neuropathy uncontrolled   • URI, acute 02/06/2018   • Well adult exam 11/30/2017       Social History     Socioeconomic History   • Marital status:      Spouse name: Brenda   • Number of children: 2   • Years of education: 12   Tobacco  Use   • Smoking status: Never     Passive exposure: Never   • Smokeless tobacco: Never   Vaping Use   • Vaping Use: Never used   Substance and Sexual Activity   • Alcohol use: No   • Drug use: No   • Sexual activity: Yes     Partners: Female     Birth control/protection: None       Family History   Problem Relation Age of Onset   • Diabetes Mother    • Heart disease Mother    • Hypertension Mother    • Heart disease Father    • Diabetes Brother    • Cancer Brother        No Known Allergies    ROS:   Constitutional:  Denies fatigue, tiredness.    Eyes:  Denies change in visual acuity   HENT:  Denies nasal congestion or sore throat   Respiratory: denies cough, shortness of breath.   Cardiovascular:  denies chest pain, edema   GI:  Denies abdominal pain, nausea, vomiting.   Musculoskeletal:  Denies back pain or joint pain   Integument:  Denies dry skin and rash   Neurologic:  Denies headache, focal weakness or sensory changes   Endocrine:  Denies polyuria or polydipsia   Psychiatric:  Denies depression or anxiety      Vitals:    01/16/23 1435   BP: 135/75   Pulse: 80   SpO2: 98%     BMI: 42.9  Physical Exam:  GEN: NAD, conversant, obese  EYES: EOMI, PERRL, no conjunctival erythema  NECK: no thyromegaly, full ROM   CV: RRR, no murmurs/rubs/gallops, no peripheral edema  LUNG: CTAB, no wheezes/rales/ronchi  SKIN: no rashes, no acanthosis  MSK: no deformities, full ROM of all extremities  NEURO: no tremors, DTR normal  PSYCH: AOX3, appropriate mood, affect normal  Feet: Has calluses on both feet, no ulcer      Results Review:     I reviewed the patient's new clinical results.    Lab Results   Component Value Date    HGBA1C 6.0 (H) 01/09/2023    HGBA1C 6.4 (H) 05/19/2022    HGBA1C 6.3 (H) 11/09/2021      Lab Results   Component Value Date    GLUCOSE 86 01/09/2023    BUN 12 01/09/2023    CREATININE 0.89 01/09/2023    EGFRIFNONA 77 11/09/2021    EGFRIFAFRI 89 11/09/2021    BCR 13 01/09/2023    K 4.6 01/09/2023    CO2 24  01/09/2023    CALCIUM 8.9 01/09/2023    PROTENTOTREF 6.4 01/09/2023    ALBUMIN 3.8 01/09/2023    LABIL2 1.5 01/09/2023    AST 26 01/09/2023    ALT 22 01/09/2023    CHOL 91 05/11/2021    TRIG 77 01/09/2023    LDL 38 01/09/2023    HDL 37 (L) 01/09/2023     Lab Results   Component Value Date    TSH 3.51 12/01/2017         Medication Review: Reviewed.       Current Outpatient Medications:   •  ammonium lactate (Lac-Hydrin) 12 % cream, Apply  topically to the appropriate area as directed Daily. Both lower legs, Disp: 385 g, Rfl: 2  •  atorvastatin (LIPITOR) 10 MG tablet, Take 1 tablet by mouth once daily, Disp: 90 tablet, Rfl: 1  •  azelastine (OPTIVAR) 0.05 % ophthalmic solution, INSTILL 1 DROP INTO EACH EYE 1 TO 2 TIMES DAILY, Disp: , Rfl:   •  bumetanide (BUMEX) 1 MG tablet, Take 1 tablet by mouth once daily, Disp: 90 tablet, Rfl: 0  •  Canagliflozin (Invokana) 100 MG tablet tablet, Take 1 tablet by mouth Daily., Disp: 90 tablet, Rfl: 3  •  FREESTYLE LITE test strip, USE 1 STRIP TO CHECK GLUCOSE TWICE DAILY, Disp: 300 each, Rfl: 3  •  gabapentin (NEURONTIN) 600 MG tablet, Take 1 tablet by mouth 4 times daily, Disp: 120 tablet, Rfl: 2  •  glimepiride (AMARYL) 2 MG tablet, Take 1 tablet by mouth 2 (Two) Times a Day., Disp: 180 tablet, Rfl: 3  •  Insulin Lispro Prot & Lispro (humaLOG 75-25) (75-25) 100 UNIT/ML suspension pen-injector pen, Inject 25 Units under the skin into the appropriate area as directed 2 (Two) Times a Day With Meals., Disp: 15 mL, Rfl: 5  •  Insulin Pen Needle (BD Pen Needle Supriya U/F) 32G X 4 MM misc, Use as directed to inject insulin twice daily, Disp: 100 each, Rfl: 3  •  Janumet XR  MG tablet, Take 2 tablets by mouth once daily, Disp: 180 tablet, Rfl: 0  •  lisinopril (PRINIVIL,ZESTRIL) 20 MG tablet, Take 1 tablet by mouth Daily., Disp: 90 tablet, Rfl: 3  •  meloxicam (MOBIC) 15 MG tablet, Take 15 mg by mouth Daily., Disp: , Rfl:   •  methocarbamol (ROBAXIN) 750 MG tablet, Take 1,500 mg by  mouth 3 (Three) Times a Day., Disp: , Rfl:   •  mupirocin (Bactroban) 2 % ointment, Apply  topically to the appropriate area as directed 2 (Two) Times a Day., Disp: 30 g, Rfl: 2  •  traMADol (ULTRAM) 50 MG tablet, Take 1 tablet by mouth Every 8 (Eight) Hours As Needed for Moderate Pain or Severe Pain., Disp: 60 tablet, Rfl: 0      Assessment & Plan   1.  Diabetes mellitus type 2 with Hyperglycemia: Excellent control with A1c of 6.0%.  I have reviewed his blood sugar records.  At this time I will continue his current regimen Janumet, Invokana, glimepiride and Humalog mix 75/25 insulin.  And follow blood sugars and A1c.  I advised him to get annual eye exam and flu vaccine however he does not want to get a flu shot but he is interested in getting pneumonia vaccine today.  Advised to always keep glucose source with him in case of low blood sugar.    2.  Hypertension: Much better, continue lisinopril..    3.  Hyperlipidemia: Well-controlled, on  atorvastatin 10 mg p.o. daily.    4.  Obesity: Advised to continue work on diet and activity and try to lose some weight.    5.  Diabetic peripheral neuropathy: Stable.    Assessment and plan from May 26, 2022 reviewed and updated.            Tono Allison MD FACE.    Much of the above report is an electronic transcription/translation of the spoken language to printed text using Dragon Software. As such, the subtleties and finesse of the spoken language may permit erroneous, or at times, nonsensical words or phrases to be inadvertently transcribed; thus changes may be made at a later date to rectify these errors.

## 2023-01-24 ENCOUNTER — OFFICE VISIT (OUTPATIENT)
Dept: PODIATRY | Facility: CLINIC | Age: 68
End: 2023-01-24
Payer: MEDICARE

## 2023-01-24 VITALS — HEIGHT: 70 IN | WEIGHT: 299 LBS | BODY MASS INDEX: 42.8 KG/M2 | HEART RATE: 58 BPM

## 2023-01-24 DIAGNOSIS — E11.65 TYPE 2 DIABETES MELLITUS WITH HYPERGLYCEMIA, WITH LONG-TERM CURRENT USE OF INSULIN: ICD-10-CM

## 2023-01-24 DIAGNOSIS — Z79.4 TYPE 2 DIABETES MELLITUS WITH HYPERGLYCEMIA, WITH LONG-TERM CURRENT USE OF INSULIN: ICD-10-CM

## 2023-01-24 DIAGNOSIS — E11.42 DM TYPE 2 WITH DIABETIC PERIPHERAL NEUROPATHY: Primary | ICD-10-CM

## 2023-01-24 DIAGNOSIS — B35.1 ONYCHOMYCOSIS: ICD-10-CM

## 2023-01-24 DIAGNOSIS — L85.3 XEROSIS OF SKIN: ICD-10-CM

## 2023-01-24 PROCEDURE — 99213 OFFICE O/P EST LOW 20 MIN: CPT

## 2023-01-24 PROCEDURE — 11721 DEBRIDE NAIL 6 OR MORE: CPT

## 2023-01-24 NOTE — PROGRESS NOTES
"01/24/2023  Foot and Ankle Surgery - Established Patient/Follow-up  Provider: LAURYN Wolfe   Location: [facility]    Subjective:  Haider Holland is a 67 y.o. male.     Chief Complaint   Patient presents with   • Left Foot - Follow-up, Diabetes, Callouses, Nail Problem     PAIN 0 DM FOOT CHECK    • Right Foot - Follow-up, Diabetes, Callouses, Nail Problem     PAIN 0 DM FOOT CHECK    • Follow-up     ALONZO WALLS  12/12/2022       The patient presents to the office today for a routine diabetic foot evaluation.     He states his toenails are \"going down\" slightly. He adds that he purchased a vibrating foot bath which he enjoys. The patient notes he evaluates the temperature of the water with his hand prior to placing his feet in the water. He notes he is able to reach his feet to apply lotion. He is interested in obtaining a pair of diabetic shoes if they are covered by his insurance.     The patient states he saw his doctor approximately 2 weeks ago, where laboratory studies were obtained revealing an A1c of 5 percent. He reports he continues on insulin with no medication changes. He adds that he monitors his blood glucose at home.     No Known Allergies    Current Outpatient Medications on File Prior to Visit   Medication Sig Dispense Refill   • ammonium lactate (Lac-Hydrin) 12 % cream Apply  topically to the appropriate area as directed Daily. Both lower legs 385 g 2   • atorvastatin (LIPITOR) 10 MG tablet Take 1 tablet by mouth once daily 90 tablet 1   • azelastine (OPTIVAR) 0.05 % ophthalmic solution INSTILL 1 DROP INTO EACH EYE 1 TO 2 TIMES DAILY     • bumetanide (BUMEX) 1 MG tablet Take 1 tablet by mouth once daily 90 tablet 0   • FREESTYLE LITE test strip USE 1 STRIP TO CHECK GLUCOSE TWICE DAILY 300 each 3   • gabapentin (NEURONTIN) 600 MG tablet Take 1 tablet by mouth 4 times daily 120 tablet 2   • glimepiride (AMARYL) 2 MG tablet Take 1 tablet by mouth 2 (Two) Times a Day. 180 tablet 3   • " "Insulin Lispro Prot & Lispro (humaLOG 75-25) (75-25) 100 UNIT/ML suspension pen-injector pen Inject 25 Units under the skin into the appropriate area as directed 2 (Two) Times a Day With Meals. 15 mL 5   • Insulin Pen Needle (BD Pen Needle Supriya U/F) 32G X 4 MM misc Use as directed to inject insulin twice daily 100 each 3   • Invokana 100 MG tablet tablet Take 1 tablet by mouth once daily 90 tablet 0   • Janumet XR  MG tablet Take 2 tablets by mouth once daily 180 tablet 0   • lisinopril (PRINIVIL,ZESTRIL) 20 MG tablet Take 1 tablet by mouth Daily. 90 tablet 3   • meloxicam (MOBIC) 15 MG tablet Take 15 mg by mouth Daily.     • methocarbamol (ROBAXIN) 750 MG tablet Take 1,500 mg by mouth 3 (Three) Times a Day.     • mupirocin (Bactroban) 2 % ointment Apply  topically to the appropriate area as directed 2 (Two) Times a Day. 30 g 2   • traMADol (ULTRAM) 50 MG tablet Take 1 tablet by mouth Every 8 (Eight) Hours As Needed for Moderate Pain or Severe Pain. 60 tablet 0     No current facility-administered medications on file prior to visit.       Objective   Pulse 58   Ht 177.8 cm (70\")   Wt 136 kg (299 lb)   BMI 42.90 kg/m²     Foot/Ankle Exam:       General:   Diabetic Foot Exam Performed    Appearance: appears stated age and healthy and disheveled    Orientation: AAOx3    Affect: appropriate    Gait: unimpaired    Assistance: cane    Shoe Gear:  Casual shoes    VASCULAR      Right Foot Vascularity   Dorsalis pedis:  1+ (Faint)  Posterior tibial:  1+  Skin Temperature: warm    Edema Grading:  None  CFT:  < 3 seconds  Pedal Hair Growth:  Absent  Varicosities: none       Left Foot Vascularity   Dorsalis pedis:  1+ (Faint)  Posterior tibial:  1+  Skin Temperature: warm    Edema Grading:  None  CFT:  < 3 seconds  Pedal Hair Growth:  Absent      NEUROLOGIC     Right Foot Neurologic   Light touch sensation:  Diminished  Protective Sensation using [place] Monofilament:  1     Left Foot Neurologic   Light touch " sensation:  Diminished  Protective Sensation using [place]-Te Monofilament:  0     MUSCULOSKELETAL      Right Foot Musculoskeletal   Ecchymosis:  None  Tenderness: none       Left Foot Musculoskeletal   Ecchymosis:  None  Tenderness: none       DERMATOLOGIC     Right Foot Dermatologic   Skin: dry skin and skin changes    Nails: onychomycosis, abnormally thick, subungual debris and dystrophic nails       Left Foot Dermatologic   Skin: dry skin and skin changes    Nails: onychomycosis, abnormally thick, subungual debris and dystrophic nails        Right Foot Additional Comments 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   He is utilizing a cane to ambulate today.  Decreased sensation to his feet bilaterally with 0 out of 10 noted on the [place]-Te monofilament test.   Chronic skin changes to feet bilaterally. Dry skin to bilateral feet.   Poor hygiene to bilateral feet.    01/24/2023  [place]-Te testing to the right foot is 1 out of 10.       Left Foot Additional Comments: 10/24/2022  Diabetic foot evaluation was performed today.   He is utilizing a cane to ambulate today.  Decreased sensation to his feet bilaterally with 0 out of 10 noted on the [place]-Te monofilament test.   Chronic skin changes to feet bilaterally. Dry skin to bilateral feet.   Poor hygiene to bilateral feet.    01/24/2023  [place]-Te testing to the left foot is 0 out of 10.         Assessment & Plan   Diagnoses and all orders for this visit:    1. DM type 2 with diabetic peripheral neuropathy (HCC) (Primary)    2. Type 2 diabetes mellitus with hyperglycemia, with long-term current use of insulin (HCC)    3. Xerosis of skin    4. Onychomycosis      1. Routine diabetic foot evaluation    - The patient presents to the office today for a routine diabetic foot evaluation. Do feel the patient is at a moderate risk for pedal complications related to his  diabetes mellitus type 2, as he does have significantly decreased protective sensation to his feet bilaterally. However, his blood glucose is reportedly well-controlled. Advised applying lotion to his feet daily, especially after foot baths. Will order diabetic shoes. Discussed the importance of allowing time to acclimate to new shoes over time. Explained importance of diabetic foot care, daily foot checks, and glycemic control. Patient should check both feet on a daily basis, monitor and control blood sugars, make sure that both feet and in between toes are towel dried after baths or showers. Avoid barefoot walking at all times. Check shoes before putting them on. Patient was given information on proper foot care. Call the office at the first signs of a wound or with signs of infection. He is advised to follow up in 3 months, sooner if necessary.     Nail debridement: Both feet x10    Consent and time out was performed before proceeding with the procedure. Nails were debrided with a nail nipper without complication.  No anesthesia was required.  Indications for procedure were thickened, dystrophic, and fungal appearing nails which are difficult to trim.  Proper self-care and technique was discussed with patient.  Patient was stable after procedure.    No orders of the defined types were placed in this encounter.       Transcribed from ambient dictation for LAURYN Vo by Henna Miles.  01/24/23   11:52 EST    Patient or patient representative verbalized consent to the visit recording.  I have personally performed the services described in this document as transcribed by the above individual, and it is both accurate and complete.  LAURYN Vo  1/24/2023  12:29 EST

## 2023-01-25 RX ORDER — GLIMEPIRIDE 2 MG/1
2 TABLET ORAL 2 TIMES DAILY
Qty: 180 TABLET | Refills: 2 | Status: SHIPPED | OUTPATIENT
Start: 2023-01-25

## 2023-01-26 RX ORDER — GLIMEPIRIDE 2 MG/1
TABLET ORAL
Qty: 180 TABLET | Refills: 0 | OUTPATIENT
Start: 2023-01-26

## 2023-01-29 DIAGNOSIS — E11.42 DIABETIC PERIPHERAL NEUROPATHY ASSOCIATED WITH TYPE 2 DIABETES MELLITUS: ICD-10-CM

## 2023-01-30 RX ORDER — GABAPENTIN 600 MG/1
TABLET ORAL
Qty: 120 TABLET | Refills: 5 | Status: SHIPPED | OUTPATIENT
Start: 2023-01-30

## 2023-01-30 RX ORDER — SITAGLIPTIN AND METFORMIN HYDROCHLORIDE 1000; 50 MG/1; MG/1
TABLET, FILM COATED, EXTENDED RELEASE ORAL
Qty: 180 TABLET | Refills: 3 | Status: SHIPPED | OUTPATIENT
Start: 2023-01-30

## 2023-04-17 RX ORDER — LISINOPRIL 20 MG/1
20 TABLET ORAL DAILY
Qty: 90 TABLET | Refills: 3 | Status: SHIPPED | OUTPATIENT
Start: 2023-04-17

## 2023-04-17 RX ORDER — CANAGLIFLOZIN 100 MG/1
1 TABLET, FILM COATED ORAL DAILY
Qty: 90 TABLET | Refills: 0 | Status: SHIPPED | OUTPATIENT
Start: 2023-04-17

## 2023-04-25 ENCOUNTER — OFFICE VISIT (OUTPATIENT)
Dept: PODIATRY | Facility: CLINIC | Age: 68
End: 2023-04-25
Payer: MEDICARE

## 2023-04-25 VITALS — BODY MASS INDEX: 42.8 KG/M2 | RESPIRATION RATE: 20 BRPM | HEIGHT: 70 IN | WEIGHT: 299 LBS

## 2023-04-25 DIAGNOSIS — Z79.4 TYPE 2 DIABETES MELLITUS WITH HYPERGLYCEMIA, WITH LONG-TERM CURRENT USE OF INSULIN: ICD-10-CM

## 2023-04-25 DIAGNOSIS — L85.3 XEROSIS OF SKIN: ICD-10-CM

## 2023-04-25 DIAGNOSIS — E11.42 DM TYPE 2 WITH DIABETIC PERIPHERAL NEUROPATHY: ICD-10-CM

## 2023-04-25 DIAGNOSIS — E11.65 TYPE 2 DIABETES MELLITUS WITH HYPERGLYCEMIA, WITH LONG-TERM CURRENT USE OF INSULIN: ICD-10-CM

## 2023-04-25 DIAGNOSIS — B35.1 ONYCHOMYCOSIS: Primary | ICD-10-CM

## 2023-04-25 DIAGNOSIS — S90.212A SUBUNGUAL HEMATOMA OF GREAT TOE OF LEFT FOOT, INITIAL ENCOUNTER: ICD-10-CM

## 2023-04-25 DIAGNOSIS — I87.303 CHRONIC VENOUS HYPERTENSION WITHOUT COMPLICATIONS, BILATERAL: ICD-10-CM

## 2023-04-25 NOTE — PROGRESS NOTES
"04/25/2023  Foot and Ankle Surgery - Established Patient/Follow-up  Provider: LAURYN Wolfe   Location: [facility]    Subjective:  Haider Holland is a 68 y.o. male.     Chief Complaint   Patient presents with   • Right Foot - Follow-up, Diabetes, Callouses     Dm foot care   • Left Foot - Follow-up, Diabetes, Callouses     Dm foot care   • Follow-up     ALONZO Aquino md  6/10/2022       The patient presents today for a routine diabetic foot check.     The patient states he caught his left great toe on a rug 2 to 3 times, approximately 1.5 weeks ago, and blood is visible beneath the nail. He confirms ambulating with a cane secondary to instability to his lower extremities, noting a sensation of giving out. He notes cramping to his gastrocnemii when waking up in the morning. Additionally, the patient notes stiffness with ambulating short distances or when standing for an extended period of time. He complains of difficulty ascending stairs. He states he has palpable \"bumps\" on the lateral aspect of his lower extremity to which he applies lotion. The patient states he performs Epsom salt soaks and uses a foot spa. The patient notes he enjoys his diabetic shoes which he received approximately 1 week ago. He reports he underwent WENDI testing approximately 1.5 years ago, which revealed a mild circulation issue to his lower extremities. He notes he is scheduled to see his primary care physician in 08/2023.     Patient reports that about a week ago he hit his left great toe on a piece of furniture.  He denies any significant pain.    The patient states his blood glucose was 110 mg/dL this morning and his last A1c was approximately 5 percent.     He works at the [employer] which involves ascending stairs.     No Known Allergies    Current Outpatient Medications on File Prior to Visit   Medication Sig Dispense Refill   • ammonium lactate (Lac-Hydrin) 12 % cream Apply  topically to the " "appropriate area as directed Daily. Both lower legs 385 g 2   • atorvastatin (LIPITOR) 10 MG tablet Take 1 tablet by mouth once daily 90 tablet 1   • azelastine (OPTIVAR) 0.05 % ophthalmic solution INSTILL 1 DROP INTO EACH EYE 1 TO 2 TIMES DAILY     • bumetanide (BUMEX) 1 MG tablet Take 1 tablet by mouth once daily 90 tablet 0   • Canagliflozin (Invokana) 100 MG tablet tablet Take 1 tablet by mouth Daily. 90 tablet 0   • FREESTYLE LITE test strip USE 1 STRIP TO CHECK GLUCOSE TWICE DAILY 300 each 3   • gabapentin (NEURONTIN) 600 MG tablet Take 1 tablet by mouth 4 times daily 120 tablet 5   • glimepiride (AMARYL) 2 MG tablet Take 1 tablet by mouth 2 (Two) Times a Day. 180 tablet 2   • Insulin Lispro Prot & Lispro (humaLOG 75-25) (75-25) 100 UNIT/ML suspension pen-injector pen Inject 25 Units under the skin into the appropriate area as directed 2 (Two) Times a Day With Meals. 15 mL 5   • Insulin Pen Needle (BD Pen Needle Supriya U/F) 32G X 4 MM misc Use as directed to inject insulin twice daily 100 each 3   • Janumet XR  MG tablet Take 2 tablets by mouth once daily 180 tablet 3   • lisinopril (PRINIVIL,ZESTRIL) 20 MG tablet Take 1 tablet by mouth Daily. 90 tablet 3   • meloxicam (MOBIC) 15 MG tablet Take 1 tablet by mouth Daily.     • methocarbamol (ROBAXIN) 750 MG tablet Take 2 tablets by mouth 3 (Three) Times a Day.     • mupirocin (Bactroban) 2 % ointment Apply  topically to the appropriate area as directed 2 (Two) Times a Day. 30 g 2   • traMADol (ULTRAM) 50 MG tablet Take 1 tablet by mouth Every 8 (Eight) Hours As Needed for Moderate Pain or Severe Pain. 60 tablet 0     No current facility-administered medications on file prior to visit.       Objective   Resp 20   Ht 177.8 cm (70\")   Wt 136 kg (299 lb)   BMI 42.90 kg/m²     Foot/Ankle Exam    GENERAL  Diabetic foot exam performed    Appearance:  disheveled  Orientation:  AAOx3  Affect:  appropriate  Gait:  unimpaired  Assistance:  cane use  Right shoe " gear: diabetic shoe and sock  Left shoe gear: diabetic shoe and sock    VASCULAR     Right Foot Vascularity   Normal vascular exam    Dorsalis pedis:  2+  Posterior tibial:  2+  Skin temperature:  warm  Edema grading:  None  CFT:  < 3 seconds  Pedal hair growth:  Present  Varicosities:  none     Left Foot Vascularity   Normal vascular exam    Dorsalis pedis:  2+  Posterior tibial:  2+  Skin temperature:  warm  Edema grading:  None  CFT:  < 3 seconds  Pedal hair growth:  Present  Varicosities:  none     NEUROLOGIC     Right Foot Neurologic   Light touch sensation: normal  Protective Sensation using [place]-Te Monofilament:   Sites intact: 1  Sites tested: 10     Left Foot Neurologic   Light touch sensation: normal  Protective Sensation using [place]-Te Monofilament:   Sites intact: 1  Sites tested: 10    MUSCULOSKELETAL     Right Foot Musculoskeletal   Ecchymosis:  none  Tenderness:  toenail problem       Left Foot Musculoskeletal   Ecchymosis:  toe 1 (Left great toenail subungual hematoma)  Tenderness:  toenail problem    DERMATOLOGIC      Right Foot Dermatologic   Skin  Positive for dryness (Related to chronic swelling. ) and skin changes.   Nails  1.  Positive for elongated, onychomycosis, abnormal thickness and dystrophic nail.  2.  Positive for elongated, onychomycosis, abnormal thickness and dystrophic nail.  3.  Positive for elongated, onychomycosis, abnormal thickness and dystrophic nail.  4.  Positive for elongated, onychomycosis, abnormal thickness and dystrophic nail.  5.  Positive for elongated, onychomycosis, abnormal thickness and dystrophic nail.     Left Foot Dermatologic   Skin  Positive for dryness (Related to chronic swelling. ), erythema and skin changes.   Nails  1.  Positive for elongated, onychomycosis, abnormal thickness, dystrophic nail and subungual hematoma.  2.  Positive for elongated, onychomycosis, abnormal thickness and dystrophic nail.  3.  Positive for elongated,  onychomycosis, abnormal thickness and dystrophic nail.  4.  Positive for elongated, onychomycosis, abnormally thick and dystrophic nail.  5.  Positive for elongated, onychomycosis, abnormally thick and dystrophic nail.     Left foot additional comments: .  Skin changes to the left lateral lower leg consistent with venous stasis.    Subungual hematoma to left great toenail and toenail appears to be largely unattached at the proximal border slight erythema noted.      Assessment & Plan   Diagnoses and all orders for this visit:    1. Onychomycosis (Primary)  -     Toe Nail Avulsion    2. Subungual hematoma of great toe of left foot, initial encounter    3. Type 2 diabetes mellitus with hyperglycemia, with long-term current use of insulin    4. Chronic venous hypertension without complications, bilateral    5. Xerosis of skin    6. DM type 2 with diabetic peripheral neuropathy      1. Routine diabetic foot check  - Do feel the patient is at a moderate risk for pedal pulses related to diabetes. He does have significantly decreased protective sensation to [place]-Te monofilament testing. Advised patient to monitor for an increase in cramping to his lower extremities with walking short distances and inform our office or his primary care physician as he may need to follow up with vascular.  WENDI results from 9/2022 were reviewed in chart review and WENDI and TBI were within normal limits.     . Explained importance of diabetic foot care, daily foot checks, and glycemic control. Patient should check both feet on a daily basis, monitor and control blood sugars, make sure that both feet and in between toes are towel dried after baths or showers. Avoid barefoot walking at all times. Check shoes before putting them on. Patient was given information on proper foot care. Call the office at the first signs of a wound or with signs of infection.    Left great toenail appears largely unattached at the proximal border discussed  options with patient including to continue to monitor however I do feel that safest option is to remove toenail today.  Patient verbalized understanding and agrees and would like to proceed with total nail removal.    Nail debridement: Both feet x10    Consent and time out was performed before proceeding with the procedure. Nails were debrided with a nail nipper without complication.  No anesthesia was required.  Indications for procedure were thickened, dystrophic, and fungal appearing nails which are difficult to trim.  Proper self-care and technique was discussed with patient.  Patient was stable after procedure.    Total Nail Avulsion: Left hallux    Consent and time out was performed before proceeding with the procedure.  Left hallux was cleansed with alcohol and the digit was blocked in a ring block fashion utilizing 5 mL of 1% lidocaine plain.  A digital tourniquet was applied to the digit.  The nail was lifted from the nail bed and nail margins with a [place]. The offending nail margins were grasped with a hemostat and removed.  The nail borders were explored with a curette to ensure adequate removal.  The nail fold and exposed nail bed were flushed with normal saline.  Antibiotic ointment followed by sterile compressive dressings were then applied.  The digital tourniquet was removed.  No excessive bleeding or complications were evident.  The patient tolerated procedure and local anesthetic well.    Orders Placed This Encounter   Procedures   • Toe Nail Avulsion        Transcribed from ambient dictation for LAURYN Vo by Henna Mlies.  04/25/23   10:54 EDT    Patient or patient representative verbalized consent to the visit recording.  I have personally performed the services described in this document as transcribed by the above individual, and it is both accurate and complete.  LAURYN Vo  4/27/2023  07:41 EDT

## 2023-06-09 RX ORDER — PEN NEEDLE, DIABETIC 32GX 5/32"
NEEDLE, DISPOSABLE MISCELLANEOUS
Qty: 100 EACH | Refills: 5 | Status: SHIPPED | OUTPATIENT
Start: 2023-06-09

## 2023-06-27 PROBLEM — E11.9 DIABETES MELLITUS: Status: RESOLVED | Noted: 2022-05-25 | Resolved: 2023-06-27

## 2023-07-25 ENCOUNTER — OFFICE VISIT (OUTPATIENT)
Dept: PODIATRY | Facility: CLINIC | Age: 68
End: 2023-07-25
Payer: MEDICARE

## 2023-07-25 VITALS — BODY MASS INDEX: 42.95 KG/M2 | WEIGHT: 300 LBS | HEIGHT: 70 IN | RESPIRATION RATE: 20 BRPM

## 2023-07-25 DIAGNOSIS — S90.414A ABRASION, RIGHT LESSER TOE(S), INITIAL ENCOUNTER: ICD-10-CM

## 2023-07-25 DIAGNOSIS — B35.1 ONYCHOMYCOSIS: ICD-10-CM

## 2023-07-25 DIAGNOSIS — E11.42 DM TYPE 2 WITH DIABETIC PERIPHERAL NEUROPATHY: ICD-10-CM

## 2023-07-25 DIAGNOSIS — L85.3 XEROSIS OF SKIN: Primary | ICD-10-CM

## 2023-07-25 NOTE — PROGRESS NOTES
"07/25/2023  Foot and Ankle Surgery - Established Patient/Follow-up  Provider: LAURYN Wolfe   Location: [facility]    Subjective:  Haider Holland is a 68 y.o. male.     Chief Complaint   Patient presents with    Left Foot - Pain, Diabetes    Right Foot - Pain, Diabetes    Follow-up     ALONZO Aquino June 2023 date unknown      HPI: Patient is here today routine diabetic foot check    The patient was last seen for a left great toenail removal which has healed. He states he feels \"something running\" through the plantar aspect of his right foot. He reports he has an abrasion to his right 4th toe. He notes he hit his 4th toe on the wheel of his hospital bed on 07/24/2023. He adds he has been cleaning the abrasion with an alcohol pad and applying ointment. He states his blood glucose levels have been well, the highest he has had was 170. He notes he has a follow-up appointment with Dr. Aquino in 08/2023.     He reports he is unable to cut his toenails. He has someone cuts his toenails occasionally. He notes he has received his diabetic shoes.    No Known Allergies    Current Outpatient Medications on File Prior to Visit   Medication Sig Dispense Refill    ammonium lactate (Lac-Hydrin) 12 % cream Apply  topically to the appropriate area as directed Daily. Both lower legs 385 g 2    atorvastatin (LIPITOR) 10 MG tablet Take 1 tablet by mouth once daily 90 tablet 3    azelastine (OPTIVAR) 0.05 % ophthalmic solution INSTILL 1 DROP INTO EACH EYE 1 TO 2 TIMES DAILY      bumetanide (BUMEX) 1 MG tablet Take 1 tablet by mouth once daily 90 tablet 0    Canagliflozin (Invokana) 100 MG tablet tablet Take 1 tablet by mouth once daily 90 tablet 3    FREESTYLE LITE test strip USE 1 STRIP TO CHECK GLUCOSE TWICE DAILY 300 each 3    gabapentin (NEURONTIN) 600 MG tablet Take 1 tablet by mouth 4 times daily 120 tablet 5    glimepiride (AMARYL) 2 MG tablet Take 1 tablet by mouth 2 (Two) Times a Day. 180 tablet 2    Insulin " "Lispro Prot & Lispro (humaLOG 75-25) (75-25) 100 UNIT/ML suspension pen-injector pen Inject 25 Units under the skin into the appropriate area as directed 2 (Two) Times a Day With Meals. 15 mL 5    Insulin Pen Needle (BD Pen Needle Supriya 2nd Gen) 32G X 4 MM misc USE TO INJECT INSULIN TWICE DAILY 100 each 5    Janumet XR  MG tablet Take 2 tablets by mouth once daily 180 tablet 3    ketorolac (ACULAR) 0.5 % ophthalmic solution Administer 1 drop into the left eye 4 (Four) Times a Day.      lisinopril (PRINIVIL,ZESTRIL) 20 MG tablet Take 1 tablet by mouth Daily. 90 tablet 3    meloxicam (MOBIC) 15 MG tablet Take 1 tablet by mouth Daily.      methocarbamol (ROBAXIN) 750 MG tablet Take 2 tablets by mouth 3 (Three) Times a Day.      moxifloxacin (VIGAMOX) 0.5 % ophthalmic solution Administer 1 drop into the left eye 3 (Three) Times a Day.      mupirocin (Bactroban) 2 % ointment Apply  topically to the appropriate area as directed 2 (Two) Times a Day. 30 g 2    prednisoLONE acetate (PRED FORTE) 1 % ophthalmic suspension Administer 1 drop into the left eye 4 (Four) Times a Day.      traMADol (ULTRAM) 50 MG tablet Take 1 tablet by mouth Every 8 (Eight) Hours As Needed for Moderate Pain or Severe Pain. 60 tablet 0     No current facility-administered medications on file prior to visit.       Objective   Resp 20   Ht 177.8 cm (70\")   Wt 136 kg (300 lb)   BMI 43.05 kg/m²     Foot/Ankle Exam    GENERAL  Diabetic foot exam performed    Appearance:  disheveled  Orientation:  AAOx3  Affect:  appropriate  Gait:  unimpaired  Assistance:  cane use  Right shoe gear: diabetic shoe and sock  Left shoe gear: diabetic shoe and sock    VASCULAR     Right Foot Vascularity   Right DP grade: unable to palpate.  Skin temperature:  warm  Edema grading:  None  CFT:  < 3 seconds  Pedal hair growth:  Present  Varicosities:  none     Left Foot Vascularity   Dorsalis pedis:  1+  Skin temperature:  warm  Edema grading:  None  CFT:  < 3 " seconds  Pedal hair growth:  Present  Varicosities:  none     NEUROLOGIC     Right Foot Neurologic   Light touch sensation: normal  Protective Sensation using [place] Monofilament:   Sites intact: 1  Sites tested: 10     Left Foot Neurologic   Light touch sensation: normal  Protective Sensation using [place]-Te Monofilament:   Sites intact: 1  Sites tested: 10    MUSCULOSKELETAL     Right Foot Musculoskeletal   Ecchymosis:  none  Tenderness:  toenail problem       Left Foot Musculoskeletal   Ecchymosis:  none (Left great toenail subungual hematoma)  Tenderness:  toenail problem    DERMATOLOGIC      Right Foot Dermatologic   Skin  Positive for dryness (Related to chronic swelling. ) and skin changes.   Nails  1.  Positive for elongated, onychomycosis, abnormal thickness and dystrophic nail.  2.  Positive for elongated, onychomycosis, abnormal thickness and dystrophic nail.  3.  Positive for elongated, onychomycosis, abnormal thickness and dystrophic nail.  4.  Positive for elongated, onychomycosis, abnormal thickness and dystrophic nail. (Patient does have small abrasion to the right fourth toe measuring approximately 0.8 x 0.5 x 0.1 cm)  5.  Positive for elongated, onychomycosis, abnormal thickness and dystrophic nail.     Left Foot Dermatologic   Skin  Positive for dryness (Related to chronic swelling. ) and skin changes.   Nails  1.  Positive for onychomycosis, abnormal thickness and dystrophic nail.  2.  Positive for elongated, onychomycosis, abnormal thickness and dystrophic nail.  3.  Positive for elongated, onychomycosis, abnormal thickness and dystrophic nail.  4.  Positive for elongated, onychomycosis, abnormally thick and dystrophic nail.  5.  Positive for elongated, onychomycosis, abnormally thick and dystrophic nail.     Right foot additional comments: diminished light touch sensation to bilateral feet        Assessment & Plan   Diagnoses and all orders for this visit:    1. Xerosis of skin  (Primary)    2. Onychomycosis    3. DM type 2 with diabetic peripheral neuropathy    4. Abrasion, right lesser toe(s), initial encounter    Do feel patient is at mild to moderate risk for pedal complications related to diabetes. Discussed neuropathy with the patient. The patient's blood sugar levels have been well controlled. Encouraged to monitor his feet daily. Informed to clean and moisturize his feet on a daily basis. Advised to apply a Band-Aid to his right 4th toe and change Band-Aid daily. Follow-up in 3 months for routine diabetic foot check.     Explained importance of diabetic foot care, daily foot checks, and glycemic control. Patient should check both feet on a daily basis, monitor and control blood sugars, make sure that both feet and in between toes are towel dried after baths or showers. Avoid barefoot walking at all times. Check shoes before putting them on.   Patient was given information on proper foot care. Call the office at the first signs of a wound or with signs of infection.    Nail debridement: Both feet x6    Consent and time out was performed before proceeding with the procedure. Nails were debrided with a nail nipper without complication.  No anesthesia was required.  Indications for procedure were thickened, dystrophic, and fungal appearing nails which are difficult to trim.  Proper self-care and technique was discussed with patient.  Patient was stable after procedure.    No orders of the defined types were placed in this encounter.     Transcribed from ambient dictation for LAURYN Vo by Andrew Mccartney.  07/25/23   12:49 EDT    Patient or patient representative verbalized consent to the visit recording.  I have personally performed the services described in this document as transcribed by the above individual, and it is both accurate and complete.  LAURYN Vo  7/26/2023  07:40 EDT

## 2023-08-15 DIAGNOSIS — I10 HYPERTENSION, BENIGN: ICD-10-CM

## 2023-08-15 DIAGNOSIS — Z79.4 TYPE 2 DIABETES MELLITUS WITH HYPERGLYCEMIA, WITH LONG-TERM CURRENT USE OF INSULIN: Primary | ICD-10-CM

## 2023-08-15 DIAGNOSIS — E11.42 DIABETIC PERIPHERAL NEUROPATHY ASSOCIATED WITH TYPE 2 DIABETES MELLITUS: ICD-10-CM

## 2023-08-15 DIAGNOSIS — E11.42 DIABETIC PERIPHERAL NEUROPATHY: ICD-10-CM

## 2023-08-15 DIAGNOSIS — E78.2 MIXED HYPERLIPIDEMIA: ICD-10-CM

## 2023-08-15 DIAGNOSIS — E11.65 TYPE 2 DIABETES MELLITUS WITH HYPERGLYCEMIA, WITH LONG-TERM CURRENT USE OF INSULIN: Primary | ICD-10-CM

## 2023-08-18 LAB
ALBUMIN SERPL-MCNC: 4 G/DL (ref 3.9–4.9)
ALBUMIN/CREAT UR: 3 MG/G CREAT (ref 0–29)
ALBUMIN/GLOB SERPL: 1.6 {RATIO} (ref 1.2–2.2)
ALP SERPL-CCNC: 82 IU/L (ref 44–121)
ALT SERPL-CCNC: 21 IU/L (ref 0–44)
AMBIG ABBREV CMP14 DEFAULT: NORMAL
AMBIG ABBREV LP DEFAULT: NORMAL
AST SERPL-CCNC: 33 IU/L (ref 0–40)
BILIRUB SERPL-MCNC: 2.9 MG/DL (ref 0–1.2)
BUN SERPL-MCNC: 25 MG/DL (ref 8–27)
BUN/CREAT SERPL: 23 (ref 10–24)
CALCIUM SERPL-MCNC: 9.4 MG/DL (ref 8.6–10.2)
CHLORIDE SERPL-SCNC: 107 MMOL/L (ref 96–106)
CHOLEST SERPL-MCNC: 88 MG/DL (ref 100–199)
CO2 SERPL-SCNC: 19 MMOL/L (ref 20–29)
CREAT SERPL-MCNC: 1.11 MG/DL (ref 0.76–1.27)
CREAT UR-MCNC: 144.1 MG/DL
EGFRCR SERPLBLD CKD-EPI 2021: 72 ML/MIN/1.73
GLOBULIN SER CALC-MCNC: 2.5 G/DL (ref 1.5–4.5)
GLUCOSE SERPL-MCNC: 101 MG/DL (ref 70–99)
HBA1C MFR BLD: 5.9 % (ref 4.8–5.6)
HDLC SERPL-MCNC: 40 MG/DL
LDLC SERPL CALC-MCNC: 33 MG/DL (ref 0–99)
MICROALBUMIN UR-MCNC: 4.7 UG/ML
POTASSIUM SERPL-SCNC: 4.8 MMOL/L (ref 3.5–5.2)
PROT SERPL-MCNC: 6.5 G/DL (ref 6–8.5)
SODIUM SERPL-SCNC: 141 MMOL/L (ref 134–144)
TRIGL SERPL-MCNC: 68 MG/DL (ref 0–149)
VLDLC SERPL CALC-MCNC: 15 MG/DL (ref 5–40)

## 2023-08-22 ENCOUNTER — OFFICE VISIT (OUTPATIENT)
Dept: ENDOCRINOLOGY | Facility: CLINIC | Age: 68
End: 2023-08-22
Payer: MEDICARE

## 2023-08-22 VITALS
BODY MASS INDEX: 40.8 KG/M2 | SYSTOLIC BLOOD PRESSURE: 135 MMHG | DIASTOLIC BLOOD PRESSURE: 68 MMHG | HEART RATE: 95 BPM | WEIGHT: 285 LBS | HEIGHT: 70 IN | OXYGEN SATURATION: 97 %

## 2023-08-22 DIAGNOSIS — E66.01 CLASS 3 SEVERE OBESITY DUE TO EXCESS CALORIES WITHOUT SERIOUS COMORBIDITY WITH BODY MASS INDEX (BMI) OF 40.0 TO 44.9 IN ADULT: ICD-10-CM

## 2023-08-22 DIAGNOSIS — E11.65 TYPE 2 DIABETES MELLITUS WITH HYPERGLYCEMIA, WITH LONG-TERM CURRENT USE OF INSULIN: Primary | ICD-10-CM

## 2023-08-22 DIAGNOSIS — E78.2 MIXED HYPERLIPIDEMIA: ICD-10-CM

## 2023-08-22 DIAGNOSIS — E11.42 DIABETIC PERIPHERAL NEUROPATHY: ICD-10-CM

## 2023-08-22 DIAGNOSIS — Z79.4 TYPE 2 DIABETES MELLITUS WITH HYPERGLYCEMIA, WITH LONG-TERM CURRENT USE OF INSULIN: Primary | ICD-10-CM

## 2023-08-22 DIAGNOSIS — I10 HYPERTENSION, BENIGN: ICD-10-CM

## 2023-08-22 PROBLEM — E66.813 CLASS 3 SEVERE OBESITY DUE TO EXCESS CALORIES WITHOUT SERIOUS COMORBIDITY WITH BODY MASS INDEX (BMI) OF 40.0 TO 44.9 IN ADULT: Status: ACTIVE | Noted: 2019-02-05

## 2023-08-22 LAB — GLUCOSE BLDC GLUCOMTR-MCNC: 138 MG/DL (ref 70–105)

## 2023-08-22 PROCEDURE — 82948 REAGENT STRIP/BLOOD GLUCOSE: CPT | Performed by: INTERNAL MEDICINE

## 2023-08-22 PROCEDURE — 1159F MED LIST DOCD IN RCRD: CPT | Performed by: INTERNAL MEDICINE

## 2023-08-22 PROCEDURE — 1160F RVW MEDS BY RX/DR IN RCRD: CPT | Performed by: INTERNAL MEDICINE

## 2023-08-22 PROCEDURE — 3044F HG A1C LEVEL LT 7.0%: CPT | Performed by: INTERNAL MEDICINE

## 2023-08-22 PROCEDURE — 3078F DIAST BP <80 MM HG: CPT | Performed by: INTERNAL MEDICINE

## 2023-08-22 PROCEDURE — 99214 OFFICE O/P EST MOD 30 MIN: CPT | Performed by: INTERNAL MEDICINE

## 2023-08-22 PROCEDURE — 3075F SYST BP GE 130 - 139MM HG: CPT | Performed by: INTERNAL MEDICINE

## 2023-08-22 RX ORDER — INSULIN LISPRO 100 [IU]/ML
15 INJECTION, SUSPENSION SUBCUTANEOUS 2 TIMES DAILY WITH MEALS
Qty: 15 ML | Refills: 5 | Status: SHIPPED | OUTPATIENT
Start: 2023-08-22

## 2023-08-22 NOTE — PROGRESS NOTES
Endocrine Progress Note Outpatient     Patient Care Team:  Cuca Aquino MD as PCP - General (Family Medicine)    Chief Complaint: Follow up type 2 diabetes    HPI: 68-year-old male with history of type 2 diabetes, hypertension, hyperlipidemia and obesity is here for follow-up.    For type 2 diabetes he is currently on Janumet XR 50/thousand, 2 tablets p.o. daily, glimepiride 2 mg twice a day, Invokana 100 medium daily and Humalog mix 75/25, 20 units twice a day.  Unfortunately did not bring blood sugar records for review, trying to work on his diet.  No significant issues with low blood sugars.  Has not required any assistance for hypoglycemia.  Has lost almost 15 pounds since last seen.    Hypertension: Well-controlled.    Hyperlipidemia: On atorvastatin.    Obesity: He is advised to continue to work on his diet and activity.    Diabetic peripheral neuropathy: Stable.     Past Medical History:   Diagnosis Date    Abnormal liver function tests 09/27/2017    Bilateral lower extremity edema 02/05/2019    BMI 40.0-44.9, adult 02/05/2019    CAD (coronary artery disease) 10/24/2017    Cellulitis of leg, right 04/02/2019    Chest pain 10/24/2017    Cough 02/06/2018    Diabetes mellitus, type II 10/24/2017    Hyperlipidemia 09/27/2017    Hypertension, benign 09/27/2017    MI (myocardial infarction) 2008    Neuropathy 08/07/2018    Peripheral neuropathy     Screening for colon cancer 02/05/2019    Screening PSA (prostate specific antigen) 02/05/2019    Skin abscess 09/18/2018    Sore throat 02/06/2018    Special screening for malignant neoplasm of prostate 11/30/2017    Type 2 diabetes, uncontrolled, with neuropathy 09/27/2017    DM typeII with peripheral neuropathy uncontrolled    URI, acute 02/06/2018    Well adult exam 11/30/2017       Social History     Socioeconomic History    Marital status:      Spouse name: Brenda    Number of children: 2    Years of education: 12   Tobacco Use    Smoking status:  Never     Passive exposure: Never    Smokeless tobacco: Never   Vaping Use    Vaping Use: Never used   Substance and Sexual Activity    Alcohol use: No    Drug use: No    Sexual activity: Yes     Partners: Female     Birth control/protection: None       Family History   Problem Relation Age of Onset    Diabetes Mother     Heart disease Mother     Hypertension Mother     Heart disease Father     Diabetes Brother     Cancer Brother        No Known Allergies    ROS:   Constitutional:  Denies fatigue, tiredness.    Eyes:  Denies change in visual acuity   HENT:  Denies nasal congestion or sore throat   Respiratory: denies cough, shortness of breath.   Cardiovascular:  denies chest pain, edema   GI:  Denies abdominal pain, nausea, vomiting.   Musculoskeletal:  Denies back pain or joint pain   Integument:  Denies dry skin and rash   Neurologic:  Denies headache, focal weakness or sensory changes   Endocrine:  Denies polyuria or polydipsia   Psychiatric:  Denies depression or anxiety      Vitals:    08/22/23 1446   BP: 135/68   Pulse: 95   SpO2: 97%     BMI: 40.9  Physical Exam:  GEN: NAD, conversant, obese  EYES: EOMI, PERRL, no conjunctival erythema  NECK: no thyromegaly, full ROM   CV: RRR, no murmurs/rubs/gallops, no peripheral edema  LUNG: CTAB, no wheezes/rales/ronchi  SKIN: no rashes, no acanthosis  MSK: no deformities, full ROM of all extremities  NEURO: no tremors, DTR normal  PSYCH: AOX3, appropriate mood, affect normal  Feet: Has calluses on both feet, no ulcer      Results Review:     I reviewed the patient's new clinical results.    Lab Results   Component Value Date    HGBA1C 5.9 (H) 08/17/2023    HGBA1C 6.1 (H) 06/27/2023    HGBA1C 6.0 (H) 01/09/2023      Lab Results   Component Value Date    GLUCOSE 101 (H) 08/17/2023    BUN 25 08/17/2023    CREATININE 1.11 08/17/2023    EGFRIFNONA 77 11/09/2021    EGFRIFAFRI 89 11/09/2021    BCR 23 08/17/2023    K 4.8 08/17/2023    CO2 19 (L) 08/17/2023    CALCIUM 9.4  08/17/2023    PROTENTOTREF 6.5 08/17/2023    ALBUMIN 4.0 08/17/2023    LABIL2 1.6 08/17/2023    AST 33 08/17/2023    ALT 21 08/17/2023    CHOL 91 05/11/2021    TRIG 68 08/17/2023    LDL 33 08/17/2023    HDL 40 08/17/2023     Lab Results   Component Value Date    TSH 3.51 12/01/2017         Medication Review: Reviewed.       Current Outpatient Medications:     ammonium lactate (Lac-Hydrin) 12 % cream, Apply  topically to the appropriate area as directed Daily. Both lower legs, Disp: 385 g, Rfl: 2    atorvastatin (LIPITOR) 10 MG tablet, Take 1 tablet by mouth once daily, Disp: 90 tablet, Rfl: 3    azelastine (OPTIVAR) 0.05 % ophthalmic solution, INSTILL 1 DROP INTO EACH EYE 1 TO 2 TIMES DAILY, Disp: , Rfl:     bumetanide (BUMEX) 1 MG tablet, Take 1 tablet by mouth once daily, Disp: 90 tablet, Rfl: 0    Canagliflozin (Invokana) 100 MG tablet tablet, Take 1 tablet by mouth once daily, Disp: 90 tablet, Rfl: 3    FREESTYLE LITE test strip, USE 1 STRIP TO CHECK GLUCOSE TWICE DAILY, Disp: 300 each, Rfl: 3    gabapentin (NEURONTIN) 600 MG tablet, Take 1 tablet by mouth 4 times daily, Disp: 120 tablet, Rfl: 5    glimepiride (AMARYL) 2 MG tablet, Take 1 tablet by mouth 2 (Two) Times a Day., Disp: 180 tablet, Rfl: 2    Insulin Lispro Prot & Lispro (humaLOG 75-25) (75-25) 100 UNIT/ML suspension pen-injector pen, Inject 25 Units under the skin into the appropriate area as directed 2 (Two) Times a Day With Meals., Disp: 15 mL, Rfl: 5    Insulin Pen Needle (BD Pen Needle Supriya 2nd Gen) 32G X 4 MM misc, USE TO INJECT INSULIN TWICE DAILY, Disp: 100 each, Rfl: 5    Janumet XR  MG tablet, Take 2 tablets by mouth once daily, Disp: 180 tablet, Rfl: 3    ketorolac (ACULAR) 0.5 % ophthalmic solution, Administer 1 drop into the left eye 4 (Four) Times a Day., Disp: , Rfl:     lisinopril (PRINIVIL,ZESTRIL) 20 MG tablet, Take 1 tablet by mouth Daily., Disp: 90 tablet, Rfl: 3    meloxicam (MOBIC) 15 MG tablet, Take 1 tablet by mouth  Daily., Disp: , Rfl:     methocarbamol (ROBAXIN) 750 MG tablet, Take 2 tablets by mouth 3 (Three) Times a Day., Disp: , Rfl:     moxifloxacin (VIGAMOX) 0.5 % ophthalmic solution, Administer 0.05 mL into the left eye 3 (Three) Times a Day., Disp: , Rfl:     mupirocin (Bactroban) 2 % ointment, Apply  topically to the appropriate area as directed 2 (Two) Times a Day., Disp: 30 g, Rfl: 2    prednisoLONE acetate (PRED FORTE) 1 % ophthalmic suspension, Administer 1 drop into the left eye 4 (Four) Times a Day., Disp: , Rfl:       Assessment & Plan   1.  Diabetes mellitus type 2 with Hyperglycemia: Uncontrolled with A1c at 5.9%.  He is having some low blood sugars, will reduce 75/25 insulin to 15 units subcu twice a day, will continue Janumet, Invokana, glimepiride.  Advised to continue to work on diet and activity and always keep glucose source in case of low blood sugars.      2.  Hypertension: Much better, continue lisinopril..    3.  Hyperlipidemia: Well-controlled, on  atorvastatin 10 mg p.o. daily.    4.  Obesity: Has lost 15 pounds since last seen, encouraged to continue to work on diet and activity.    5.  Diabetic peripheral neuropathy: Stable.    Assessment and plan and plan from January 16, 2023 reviewed and updated.            Tono Allison MD FACE.    Much of the above report is an electronic transcription/translation of the spoken language to printed text using Dragon Software. As such, the subtleties and finesse of the spoken language may permit erroneous, or at times, nonsensical words or phrases to be inadvertently transcribed; thus changes may be made at a later date to rectify these errors.

## 2023-08-22 NOTE — PATIENT INSTRUCTIONS
Decrease Humalog mix 75/25 insulin to 15 units subcu twice a day before breakfast and supper  Continue rest of the medications  Continue to work on your diet and activity  Always keep glucose source in case of low blood sugar  Labs before follow-up.   throat, pain

## 2023-10-31 RX ORDER — GLIMEPIRIDE 2 MG/1
2 TABLET ORAL 2 TIMES DAILY
Qty: 180 TABLET | Refills: 4 | Status: SHIPPED | OUTPATIENT
Start: 2023-10-31

## 2023-11-04 DIAGNOSIS — E11.65 UNCONTROLLED TYPE 2 DIABETES MELLITUS WITH HYPERGLYCEMIA: ICD-10-CM

## 2023-11-04 RX ORDER — BLOOD-GLUCOSE METER
KIT MISCELLANEOUS
Qty: 200 EACH | Refills: 3 | Status: SHIPPED | OUTPATIENT
Start: 2023-11-04

## 2023-12-13 ENCOUNTER — OFFICE VISIT (OUTPATIENT)
Dept: FAMILY MEDICINE CLINIC | Facility: CLINIC | Age: 68
End: 2023-12-13
Payer: MEDICARE

## 2023-12-13 VITALS
DIASTOLIC BLOOD PRESSURE: 66 MMHG | WEIGHT: 295 LBS | OXYGEN SATURATION: 97 % | SYSTOLIC BLOOD PRESSURE: 125 MMHG | BODY MASS INDEX: 42.23 KG/M2 | HEART RATE: 80 BPM | HEIGHT: 70 IN | TEMPERATURE: 98.4 F

## 2023-12-13 DIAGNOSIS — Z00.00 PHYSICAL EXAM, ANNUAL: Primary | ICD-10-CM

## 2023-12-13 PROCEDURE — 3074F SYST BP LT 130 MM HG: CPT | Performed by: FAMILY MEDICINE

## 2023-12-13 PROCEDURE — 1160F RVW MEDS BY RX/DR IN RCRD: CPT | Performed by: FAMILY MEDICINE

## 2023-12-13 PROCEDURE — 3078F DIAST BP <80 MM HG: CPT | Performed by: FAMILY MEDICINE

## 2023-12-13 PROCEDURE — 1159F MED LIST DOCD IN RCRD: CPT | Performed by: FAMILY MEDICINE

## 2023-12-13 PROCEDURE — G0439 PPPS, SUBSEQ VISIT: HCPCS | Performed by: FAMILY MEDICINE

## 2023-12-13 PROCEDURE — 1170F FXNL STATUS ASSESSED: CPT | Performed by: FAMILY MEDICINE

## 2023-12-13 PROCEDURE — 3044F HG A1C LEVEL LT 7.0%: CPT | Performed by: FAMILY MEDICINE

## 2023-12-13 NOTE — PROGRESS NOTES
The ABCs of the Annual Wellness Visit  Subsequent Medicare Wellness Visit    Subjective      Hiader Holland is a 68 y.o. male who presents for a Subsequent Medicare Wellness Visit.    No complaints today    The following portions of the patient's history were reviewed and   updated as appropriate: allergies, current medications, past family history, past medical history, past social history, past surgical history, and problem list.    Compared to one year ago, the patient feels his physical   health is the same.    Compared to one year ago, the patient feels his mental   health is the same.    Recent Hospitalizations:  He was not admitted to the hospital during the last year.       Current Medical Providers:  Patient Care Team:  Cuca Aquino MD as PCP - General (Family Medicine)    Outpatient Medications Prior to Visit   Medication Sig Dispense Refill    ammonium lactate (Lac-Hydrin) 12 % cream Apply  topically to the appropriate area as directed Daily. Both lower legs 385 g 2    atorvastatin (LIPITOR) 10 MG tablet Take 1 tablet by mouth once daily 90 tablet 3    azelastine (OPTIVAR) 0.05 % ophthalmic solution INSTILL 1 DROP INTO EACH EYE 1 TO 2 TIMES DAILY      bumetanide (BUMEX) 1 MG tablet Take 1 tablet by mouth once daily 90 tablet 0    Canagliflozin (Invokana) 100 MG tablet tablet Take 1 tablet by mouth once daily 90 tablet 3    gabapentin (NEURONTIN) 600 MG tablet Take 1 tablet by mouth 4 times daily 120 tablet 5    glimepiride (AMARYL) 2 MG tablet Take 1 tablet by mouth twice daily 180 tablet 4    glucose blood (FREESTYLE LITE) test strip USE 1 STRIP TO CHECK GLUCOSE TWICE DAILY 200 each 3    Insulin Lispro Prot & Lispro (humaLOG 75-25) (75-25) 100 UNIT/ML suspension pen-injector pen Inject 15 Units under the skin into the appropriate area as directed 2 (Two) Times a Day With Meals. 15 mL 5    Insulin Pen Needle (BD Pen Needle Supriya 2nd Gen) 32G X 4 MM misc USE TO INJECT INSULIN TWICE DAILY 100 each 5     Janumet XR  MG tablet Take 2 tablets by mouth once daily 180 tablet 3    ketorolac (ACULAR) 0.5 % ophthalmic solution Administer 1 drop into the left eye 4 (Four) Times a Day.      lisinopril (PRINIVIL,ZESTRIL) 20 MG tablet Take 1 tablet by mouth Daily. 90 tablet 3    meloxicam (MOBIC) 15 MG tablet Take 1 tablet by mouth Daily.      methocarbamol (ROBAXIN) 750 MG tablet Take 2 tablets by mouth 3 (Three) Times a Day.      moxifloxacin (VIGAMOX) 0.5 % ophthalmic solution Administer 0.05 mL into the left eye 3 (Three) Times a Day.      mupirocin (Bactroban) 2 % ointment Apply  topically to the appropriate area as directed 2 (Two) Times a Day. 30 g 2    prednisoLONE acetate (PRED FORTE) 1 % ophthalmic suspension Administer 1 drop into the left eye 4 (Four) Times a Day.       No facility-administered medications prior to visit.       No opioid medication identified on active medication list. I have reviewed chart for other potential  high risk medication/s and harmful drug interactions in the elderly.        Aspirin is not on active medication list.  Aspirin use is not indicated based on review of current medical condition/s. Risk of harm outweighs potential benefits.  .    Patient Active Problem List   Diagnosis    Abnormal liver function tests    Class 3 severe obesity due to excess calories without serious comorbidity with body mass index (BMI) of 40.0 to 44.9 in adult    Coronary artery disease    Diabetic peripheral neuropathy associated with type 2 diabetes mellitus    Edema of lower extremity    Mixed hyperlipidemia    Hypertension, benign    Neuropathy    Type 2 diabetes mellitus with hyperglycemia, with long-term current use of insulin    PAD (peripheral artery disease)    History of colon polyps    Mild nonproliferative diabetic retinopathy without macular edema associated with type 2 diabetes mellitus    Diabetic dermopathy associated with type 2 diabetes mellitus    Venous insufficiency of both  "lower extremities    Arthritis of right glenohumeral joint    Diabetic peripheral neuropathy    Osteoarthritis of knee    Restless legs    Ureteric stone    Obesity    Coronary arteriosclerosis    Hypertensive disorder    Knee pain    Mild aortic stenosis by prior echocardiogram    Other spondylosis with radiculopathy, lumbar region    Chronic midline low back pain    Follow-up exam     Advance Care Planning   Advance Care Planning     Advance Directive is not on file.  ACP discussion was held with the patient during this visit. Patient does not have an advance directive, information provided.  WORKING WITH A LAYWER      Objective    Vitals:    12/13/23 1003   BP: 125/66   BP Location: Right arm   Patient Position: Sitting   Cuff Size: Adult   Pulse: 80   Temp: 98.4 °F (36.9 °C)   TempSrc: Infrared   SpO2: 97%   Weight: 134 kg (295 lb)   Height: 177.8 cm (70\")     Estimated body mass index is 42.33 kg/m² as calculated from the following:    Height as of this encounter: 177.8 cm (70\").    Weight as of this encounter: 134 kg (295 lb).    Class 3 Severe Obesity (BMI >=40). Obesity-related health conditions include the following: hypertension, coronary heart disease, diabetes mellitus, dyslipidemias, peripheral vascular disease, and lower extremity venous stasis disease. Obesity is unchanged. BMI is is above average; BMI management plan is completed. We discussed low calorie, low carb based diet program, portion control, and increasing exercise.      Does the patient have evidence of cognitive impairment?   No            HEALTH RISK ASSESSMENT    Smoking Status:  Social History     Tobacco Use   Smoking Status Never    Passive exposure: Never   Smokeless Tobacco Never     Alcohol Consumption:  Social History     Substance and Sexual Activity   Alcohol Use No     Fall Risk Screen:    STEADI Fall Risk Assessment was completed, and patient is at MODERATE risk for falls. Assessment completed on:6/27/2023    Depression " Screenin/13/2023    10:00 AM   PHQ-2/PHQ-9 Depression Screening   Little Interest or Pleasure in Doing Things 0-->not at all   Feeling Down, Depressed or Hopeless 0-->not at all   PHQ-9: Brief Depression Severity Measure Score 0       Health Habits and Functional and Cognitive Screenin/13/2023    10:00 AM   Functional & Cognitive Status   Do you have difficulty preparing food and eating? No   Do you have difficulty bathing yourself, getting dressed or grooming yourself? No   Do you have difficulty using the toilet? No   Do you have difficulty moving around from place to place? Yes   Do you have trouble with steps or getting out of a bed or a chair? Yes   Current Diet Well Balanced Diet   Dental Exam Not up to date   Eye Exam Up to date   Exercise (times per week) 0 times per week   Current Exercises Include No Regular Exercise   Do you need help using the phone?  No   Are you deaf or do you have serious difficulty hearing?  Yes   Do you need help to go to places out of walking distance? Yes   Do you need help shopping? No   Do you need help preparing meals?  No   Do you need help with housework?  No   Do you need help with laundry? No   Do you need help taking your medications? No   Do you need help managing money? No   Have you felt unusual stress, anger or loneliness in the last month? No   Who do you live with? Spouse   If you need help, do you have trouble finding someone available to you? No   Have you been bothered in the last four weeks by sexual problems? No   Do you have difficulty concentrating, remembering or making decisions? No       Age-appropriate Screening Schedule:  Refer to the list below for future screening recommendations based on patient's age, sex and/or medical conditions. Orders for these recommended tests are listed in the plan section. The patient has been provided with a written plan.    Health Maintenance   Topic Date Due    TDAP/TD VACCINES (1 - Tdap) Never done     COLORECTAL CANCER SCREENING  03/04/2022    ANNUAL WELLNESS VISIT  12/12/2023    BMI FOLLOWUP  12/12/2023    HEMOGLOBIN A1C  02/17/2024    DIABETIC EYE EXAM  07/10/2024    DIABETIC FOOT EXAM  07/25/2024    LIPID PANEL  08/17/2024    URINE MICROALBUMIN  08/17/2024    HEPATITIS C SCREENING  Completed    COVID-19 Vaccine  Completed    INFLUENZA VACCINE  Completed    Pneumococcal Vaccine 65+  Completed    ZOSTER VACCINE  Completed                  CMS Preventative Services Quick Reference  Risk Factors Identified During Encounter:    Immunizations Discussed/Encouraged: Tdap    The above risks/problems have been discussed with the patient.  Pertinent information has been shared with the patient in the After Visit Summary.    Diagnoses and all orders for this visit:    1. Physical exam, annual (Primary)    Colonoscopy - 2019 - due in 5 years    Declines PSA    Has had flu, covid, PNA shots.    Follow Up:   Next Medicare Wellness visit to be scheduled in 1 year.      An After Visit Summary and PPPS were made available to the patient.

## 2024-01-22 DIAGNOSIS — E11.42 DIABETIC PERIPHERAL NEUROPATHY ASSOCIATED WITH TYPE 2 DIABETES MELLITUS: ICD-10-CM

## 2024-01-22 RX ORDER — GABAPENTIN 600 MG/1
TABLET ORAL
Qty: 120 TABLET | Refills: 2 | Status: SHIPPED | OUTPATIENT
Start: 2024-01-22

## 2024-01-22 RX ORDER — LISINOPRIL 20 MG/1
20 TABLET ORAL DAILY
Qty: 90 TABLET | Refills: 3 | Status: SHIPPED | OUTPATIENT
Start: 2024-01-22

## 2024-01-22 RX ORDER — SITAGLIPTIN AND METFORMIN HYDROCHLORIDE 1000; 50 MG/1; MG/1
TABLET, FILM COATED, EXTENDED RELEASE ORAL
Qty: 180 TABLET | Refills: 0 | OUTPATIENT
Start: 2024-01-22

## 2024-02-05 RX ORDER — SITAGLIPTIN AND METFORMIN HYDROCHLORIDE 1000; 50 MG/1; MG/1
TABLET, FILM COATED, EXTENDED RELEASE ORAL
Qty: 180 TABLET | Refills: 0 | OUTPATIENT
Start: 2024-02-05

## 2024-02-06 RX ORDER — SITAGLIPTIN AND METFORMIN HYDROCHLORIDE 1000; 50 MG/1; MG/1
TABLET, FILM COATED, EXTENDED RELEASE ORAL
Qty: 180 TABLET | Refills: 0 | Status: SHIPPED | OUTPATIENT
Start: 2024-02-06

## 2024-04-08 ENCOUNTER — OFFICE VISIT (OUTPATIENT)
Dept: ENDOCRINOLOGY | Facility: CLINIC | Age: 69
End: 2024-04-08
Payer: MEDICARE

## 2024-04-08 VITALS
HEIGHT: 70 IN | DIASTOLIC BLOOD PRESSURE: 62 MMHG | WEIGHT: 300.8 LBS | BODY MASS INDEX: 43.06 KG/M2 | SYSTOLIC BLOOD PRESSURE: 128 MMHG

## 2024-04-08 DIAGNOSIS — E66.01 CLASS 3 SEVERE OBESITY DUE TO EXCESS CALORIES WITHOUT SERIOUS COMORBIDITY WITH BODY MASS INDEX (BMI) OF 40.0 TO 44.9 IN ADULT: ICD-10-CM

## 2024-04-08 DIAGNOSIS — E11.42 DIABETIC PERIPHERAL NEUROPATHY: ICD-10-CM

## 2024-04-08 DIAGNOSIS — I10 HYPERTENSION, BENIGN: ICD-10-CM

## 2024-04-08 DIAGNOSIS — E78.2 MIXED HYPERLIPIDEMIA: ICD-10-CM

## 2024-04-08 DIAGNOSIS — Z79.4 TYPE 2 DIABETES MELLITUS WITH HYPERGLYCEMIA, WITH LONG-TERM CURRENT USE OF INSULIN: Primary | ICD-10-CM

## 2024-04-08 DIAGNOSIS — E11.65 TYPE 2 DIABETES MELLITUS WITH HYPERGLYCEMIA, WITH LONG-TERM CURRENT USE OF INSULIN: Primary | ICD-10-CM

## 2024-04-08 PROCEDURE — 1159F MED LIST DOCD IN RCRD: CPT | Performed by: INTERNAL MEDICINE

## 2024-04-08 PROCEDURE — 1160F RVW MEDS BY RX/DR IN RCRD: CPT | Performed by: INTERNAL MEDICINE

## 2024-04-08 PROCEDURE — 3074F SYST BP LT 130 MM HG: CPT | Performed by: INTERNAL MEDICINE

## 2024-04-08 PROCEDURE — 99214 OFFICE O/P EST MOD 30 MIN: CPT | Performed by: INTERNAL MEDICINE

## 2024-04-08 PROCEDURE — 3078F DIAST BP <80 MM HG: CPT | Performed by: INTERNAL MEDICINE

## 2024-04-08 PROCEDURE — G2211 COMPLEX E/M VISIT ADD ON: HCPCS | Performed by: INTERNAL MEDICINE

## 2024-04-08 NOTE — PROGRESS NOTES
Endocrine Progress Note Outpatient     Patient Care Team:  Cuca Aquino MD as PCP - General (Family Medicine)    Chief Complaint: Follow up type 2 diabetes    HPI: 69-year-old male with history of type 2 diabetes, hypertension, hyperlipidemia and obesity is here for follow-up.    For type 2 diabetes he is currently on Janumet XR 50/thousand, 2 tablets p.o. daily, glimepiride 2 mg twice a day, Invokana 100 medium daily and Humalog mix 75/25, 15 units twice a day.  Unfortunately did not bring blood sugar records for review, trying to work on his diet.  No significant issues with low blood sugars.  Has not required any assistance for hypoglycemia.      Hypertension: Well-controlled.    Hyperlipidemia: On atorvastatin.    Obesity: He is advised to continue to work on his diet and activity.    Diabetic peripheral neuropathy: Stable.     Past Medical History:   Diagnosis Date    Abnormal liver function tests 09/27/2017    Bilateral lower extremity edema 02/05/2019    BMI 40.0-44.9, adult 02/05/2019    CAD (coronary artery disease) 10/24/2017    Cellulitis of leg, right 04/02/2019    Chest pain 10/24/2017    Cough 02/06/2018    Diabetes mellitus, type II 10/24/2017    Hyperlipidemia 09/27/2017    Hypertension, benign 09/27/2017    MI (myocardial infarction) 2008    Neuropathy 08/07/2018    Peripheral neuropathy     Screening for colon cancer 02/05/2019    Screening PSA (prostate specific antigen) 02/05/2019    Skin abscess 09/18/2018    Sore throat 02/06/2018    Special screening for malignant neoplasm of prostate 11/30/2017    Type 2 diabetes, uncontrolled, with neuropathy 09/27/2017    DM typeII with peripheral neuropathy uncontrolled    URI, acute 02/06/2018    Well adult exam 11/30/2017       Social History     Socioeconomic History    Marital status:      Spouse name: Brenda    Number of children: 2    Years of education: 12   Tobacco Use    Smoking status: Never     Passive exposure: Never     Smokeless tobacco: Never   Vaping Use    Vaping status: Never Used   Substance and Sexual Activity    Alcohol use: No    Drug use: No    Sexual activity: Yes     Partners: Female     Birth control/protection: None       Family History   Problem Relation Age of Onset    Diabetes Mother     Heart disease Mother     Hypertension Mother     Heart disease Father     Diabetes Brother     Cancer Brother        No Known Allergies    ROS:   Constitutional:  Denies fatigue, tiredness.    Eyes:  Denies change in visual acuity   HENT:  Denies nasal congestion or sore throat   Respiratory: denies cough, shortness of breath.   Cardiovascular:  denies chest pain, edema   GI:  Denies abdominal pain, nausea, vomiting.   Musculoskeletal:  Denies back pain or joint pain   Integument:  Denies dry skin and rash   Neurologic:  Denies headache, focal weakness or sensory changes   Endocrine:  Denies polyuria or polydipsia   Psychiatric:  Denies depression or anxiety      Vitals:    04/08/24 1308   BP: 128/62     BMI: 43.2  Physical Exam:  GEN: NAD, conversant, obese  EYES: EOMI,  NECK: no thyromegaly,   CV: RRR,   LUNG: CTA  PSYCH: AOX3, appropriate mood, affect normal  Feet: Has calluses on both feet, no ulcer      Results Review:     I reviewed the patient's new clinical results.    Lab Results   Component Value Date    HGBA1C 5.9 (H) 08/17/2023    HGBA1C 6.1 (H) 06/27/2023    HGBA1C 6.0 (H) 01/09/2023      Lab Results   Component Value Date    GLUCOSE 101 (H) 08/17/2023    BUN 25 08/17/2023    CREATININE 1.11 08/17/2023    EGFRIFNONA 77 11/09/2021    EGFRIFAFRI 89 11/09/2021    BCR 23 08/17/2023    K 4.8 08/17/2023    CO2 19 (L) 08/17/2023    CALCIUM 9.4 08/17/2023    PROTENTOTREF 6.5 08/17/2023    ALBUMIN 4.0 08/17/2023    LABIL2 1.6 08/17/2023    AST 33 08/17/2023    ALT 21 08/17/2023    CHOL 91 05/11/2021    TRIG 68 08/17/2023    LDL 33 08/17/2023    HDL 40 08/17/2023     Lab Results   Component Value Date    TSH 3.51 12/01/2017          Medication Review: Reviewed.       Current Outpatient Medications:     ammonium lactate (Lac-Hydrin) 12 % cream, Apply  topically to the appropriate area as directed Daily. Both lower legs, Disp: 385 g, Rfl: 2    atorvastatin (LIPITOR) 10 MG tablet, Take 1 tablet by mouth once daily, Disp: 90 tablet, Rfl: 3    azelastine (OPTIVAR) 0.05 % ophthalmic solution, INSTILL 1 DROP INTO EACH EYE 1 TO 2 TIMES DAILY, Disp: , Rfl:     Canagliflozin (Invokana) 100 MG tablet tablet, Take 1 tablet by mouth once daily, Disp: 90 tablet, Rfl: 3    gabapentin (NEURONTIN) 600 MG tablet, Take 1 tablet by mouth 4 times daily, Disp: 120 tablet, Rfl: 2    glimepiride (AMARYL) 2 MG tablet, Take 1 tablet by mouth twice daily, Disp: 180 tablet, Rfl: 4    Insulin Lispro Prot & Lispro (humaLOG 75-25) (75-25) 100 UNIT/ML suspension pen-injector pen, Inject 15 Units under the skin into the appropriate area as directed 2 (Two) Times a Day With Meals., Disp: 15 mL, Rfl: 5    Insulin Pen Needle (BD Pen Needle Supriya 2nd Gen) 32G X 4 MM misc, USE TO INJECT INSULIN TWICE DAILY, Disp: 100 each, Rfl: 5    Janumet XR  MG tablet, Take 2 tablets by mouth once daily, Disp: 180 tablet, Rfl: 0      Assessment & Plan   1.  Diabetes mellitus type 2 with Hyperglycemia: Clinically doing well, no recent labs or blood sugar records.  Continue current management and follow blood sugars.    2.  Hypertension: Well-controlled, continue current medications.    3.  Hyperlipidemia: On atorvastatin, no recent labs will check lipid panel.    4.  Class III obesity with BMI of 43.2: Encouraged to continue to work on diet and activity.    5.  Diabetic peripheral neuropathy: Stable.    Assessment and plan from August 22, 2023 reviewed and updated.            Tono Allison MD FACE.    Much of the above report is an electronic transcription/translation of the spoken language to printed text using Dragon Software. As such, the subtleties and finesse of the spoken  language may permit erroneous, or at times, nonsensical words or phrases to be inadvertently transcribed; thus changes may be made at a later date to rectify these errors.

## 2024-05-03 RX ORDER — ATORVASTATIN CALCIUM 10 MG/1
TABLET, FILM COATED ORAL
Qty: 90 TABLET | Refills: 3 | Status: SHIPPED | OUTPATIENT
Start: 2024-05-03

## 2024-06-03 RX ORDER — SITAGLIPTIN AND METFORMIN HYDROCHLORIDE 1000; 50 MG/1; MG/1
TABLET, FILM COATED, EXTENDED RELEASE ORAL
Qty: 180 TABLET | Refills: 2 | Status: SHIPPED | OUTPATIENT
Start: 2024-06-03

## 2024-06-04 RX ORDER — CANAGLIFLOZIN 100 MG/1
TABLET, FILM COATED ORAL
Qty: 90 TABLET | Refills: 0 | Status: SHIPPED | OUTPATIENT
Start: 2024-06-04

## 2024-07-17 RX ORDER — PEN NEEDLE, DIABETIC 32GX 5/32"
NEEDLE, DISPOSABLE MISCELLANEOUS
Qty: 100 EACH | Refills: 0 | Status: SHIPPED | OUTPATIENT
Start: 2024-07-17

## 2024-08-03 DIAGNOSIS — E11.42 DIABETIC PERIPHERAL NEUROPATHY ASSOCIATED WITH TYPE 2 DIABETES MELLITUS: ICD-10-CM

## 2024-08-05 RX ORDER — GABAPENTIN 600 MG/1
TABLET ORAL
Qty: 120 TABLET | Refills: 5 | Status: SHIPPED | OUTPATIENT
Start: 2024-08-05

## 2024-10-02 LAB
ALBUMIN SERPL-MCNC: 3.8 G/DL (ref 3.9–4.9)
ALBUMIN/CREAT UR: <7 MG/G CREAT (ref 0–29)
ALP SERPL-CCNC: 89 IU/L (ref 44–121)
ALT SERPL-CCNC: 17 IU/L (ref 0–44)
AMBIG ABBREV CMP14 DEFAULT: NORMAL
AMBIG ABBREV LP DEFAULT: NORMAL
AST SERPL-CCNC: 23 IU/L (ref 0–40)
BILIRUB SERPL-MCNC: 0.9 MG/DL (ref 0–1.2)
BUN SERPL-MCNC: 13 MG/DL (ref 8–27)
BUN/CREAT SERPL: 13 (ref 10–24)
CALCIUM SERPL-MCNC: 9.5 MG/DL (ref 8.6–10.2)
CHLORIDE SERPL-SCNC: 106 MMOL/L (ref 96–106)
CHOLEST SERPL-MCNC: 105 MG/DL (ref 100–199)
CO2 SERPL-SCNC: 22 MMOL/L (ref 20–29)
CREAT SERPL-MCNC: 1.02 MG/DL (ref 0.76–1.27)
CREAT UR-MCNC: 43.5 MG/DL
EGFRCR SERPLBLD CKD-EPI 2021: 80 ML/MIN/1.73
GLOBULIN SER CALC-MCNC: 2.7 G/DL (ref 1.5–4.5)
GLUCOSE SERPL-MCNC: 125 MG/DL (ref 70–99)
HBA1C MFR BLD: 6.2 % (ref 4.8–5.6)
HDLC SERPL-MCNC: 33 MG/DL
LDLC SERPL CALC-MCNC: 41 MG/DL (ref 0–99)
MICROALBUMIN UR-MCNC: <3 UG/ML
POTASSIUM SERPL-SCNC: 4.8 MMOL/L (ref 3.5–5.2)
PROT SERPL-MCNC: 6.5 G/DL (ref 6–8.5)
SODIUM SERPL-SCNC: 143 MMOL/L (ref 134–144)
TRIGL SERPL-MCNC: 187 MG/DL (ref 0–149)
VLDLC SERPL CALC-MCNC: 31 MG/DL (ref 5–40)

## 2024-10-22 RX ORDER — BLOOD-GLUCOSE METER
1 KIT MISCELLANEOUS 2 TIMES DAILY
COMMUNITY
Start: 2024-09-22

## 2024-10-22 RX ORDER — LISINOPRIL 20 MG/1
1 TABLET ORAL DAILY
COMMUNITY
Start: 2024-07-30

## 2024-10-25 RX ORDER — PEN NEEDLE, DIABETIC 32GX 5/32"
NEEDLE, DISPOSABLE MISCELLANEOUS
Qty: 200 EACH | Refills: 3 | Status: SHIPPED | OUTPATIENT
Start: 2024-10-25

## 2024-10-25 RX ORDER — AZELASTINE HYDROCHLORIDE 0.5 MG/ML
SOLUTION/ DROPS OPHTHALMIC
Status: CANCELLED | OUTPATIENT
Start: 2024-10-25

## 2024-11-01 ENCOUNTER — OFFICE VISIT (OUTPATIENT)
Dept: ENDOCRINOLOGY | Facility: CLINIC | Age: 69
End: 2024-11-01
Payer: MEDICARE

## 2024-11-01 VITALS
BODY MASS INDEX: 43.23 KG/M2 | WEIGHT: 302 LBS | SYSTOLIC BLOOD PRESSURE: 132 MMHG | HEIGHT: 70 IN | DIASTOLIC BLOOD PRESSURE: 70 MMHG

## 2024-11-01 DIAGNOSIS — I10 HYPERTENSION, BENIGN: ICD-10-CM

## 2024-11-01 DIAGNOSIS — E11.65 TYPE 2 DIABETES MELLITUS WITH HYPERGLYCEMIA, WITH LONG-TERM CURRENT USE OF INSULIN: Primary | ICD-10-CM

## 2024-11-01 DIAGNOSIS — E66.813 CLASS 3 SEVERE OBESITY DUE TO EXCESS CALORIES WITHOUT SERIOUS COMORBIDITY WITH BODY MASS INDEX (BMI) OF 40.0 TO 44.9 IN ADULT: ICD-10-CM

## 2024-11-01 DIAGNOSIS — E11.42 DIABETIC PERIPHERAL NEUROPATHY: ICD-10-CM

## 2024-11-01 DIAGNOSIS — Z79.4 TYPE 2 DIABETES MELLITUS WITH HYPERGLYCEMIA, WITH LONG-TERM CURRENT USE OF INSULIN: Primary | ICD-10-CM

## 2024-11-01 DIAGNOSIS — E78.2 MIXED HYPERLIPIDEMIA: ICD-10-CM

## 2024-11-01 DIAGNOSIS — E66.01 CLASS 3 SEVERE OBESITY DUE TO EXCESS CALORIES WITHOUT SERIOUS COMORBIDITY WITH BODY MASS INDEX (BMI) OF 40.0 TO 44.9 IN ADULT: ICD-10-CM

## 2024-11-01 LAB
GLUCOSE BLDC GLUCOMTR-MCNC: 117 MG/DL (ref 70–105)
GLUCOSE BLDC GLUCOMTR-MCNC: 65 MG/DL (ref 70–105)
GLUCOSE BLDC GLUCOMTR-MCNC: 79 MG/DL (ref 70–105)
GLUCOSE BLDC GLUCOMTR-MCNC: 92 MG/DL (ref 70–105)

## 2024-11-01 PROCEDURE — 82948 REAGENT STRIP/BLOOD GLUCOSE: CPT | Performed by: INTERNAL MEDICINE

## 2024-11-01 RX ORDER — TIRZEPATIDE 5 MG/.5ML
5 INJECTION, SOLUTION SUBCUTANEOUS WEEKLY
Start: 2024-11-01

## 2024-11-01 RX ORDER — CANAGLIFLOZIN 100 MG/1
1 TABLET, FILM COATED ORAL DAILY
Qty: 90 TABLET | Refills: 0 | Status: SHIPPED | OUTPATIENT
Start: 2024-11-01

## 2024-11-01 RX ORDER — TIRZEPATIDE 2.5 MG/.5ML
2.5 INJECTION, SOLUTION SUBCUTANEOUS WEEKLY
Start: 2024-11-01

## 2024-11-01 RX ORDER — METFORMIN HYDROCHLORIDE 500 MG/1
TABLET, EXTENDED RELEASE ORAL
Qty: 60 TABLET | Refills: 6 | Status: SHIPPED | OUTPATIENT
Start: 2024-11-01

## 2024-11-01 RX ORDER — INSULIN LISPRO 100 [IU]/ML
15 INJECTION, SUSPENSION SUBCUTANEOUS 2 TIMES DAILY WITH MEALS
Qty: 15 ML | Refills: 5 | Status: SHIPPED | OUTPATIENT
Start: 2024-11-01

## 2024-11-01 NOTE — PROGRESS NOTES
Endocrine Progress Note Outpatient     Patient Care Team:  Cuca Aquino MD as PCP - General (Family Medicine)    Chief Complaint: Follow up type 2 diabetes    HPI: 69-year-old male with history of type 2 diabetes, hypertension, hyperlipidemia and obesity is here for follow-up.    For type 2 diabetes he is currently on Janumet XR 50/thousand, 2 tablets p.o. daily, glimepiride 2 mg twice a day, Invokana 100 mg daily and Humalog mix 75/25, 15 units twice a day.  He does have low blood sugar in the office today of 65, he did bring in blood sugar records for review and none of them is less than 70.  He does not have significant issues with low blood sugars usually.  He is tolerating his medications well.    Hypertension: Well-controlled.    Hyperlipidemia: On atorvastatin.    Obesity: He is advised to continue to work on his diet and activity.    Diabetic peripheral neuropathy: Stable.     Past Medical History:   Diagnosis Date    Abnormal liver function tests 09/27/2017    Bilateral lower extremity edema 02/05/2019    BMI 40.0-44.9, adult 02/05/2019    CAD (coronary artery disease) 10/24/2017    Cellulitis of leg, right 04/02/2019    Chest pain 10/24/2017    Cough 02/06/2018    Diabetes mellitus, type II 10/24/2017    Hyperlipidemia 09/27/2017    Hypertension, benign 09/27/2017    MI (myocardial infarction) 2008    Neuropathy 08/07/2018    Peripheral neuropathy     Screening for colon cancer 02/05/2019    Screening PSA (prostate specific antigen) 02/05/2019    Skin abscess 09/18/2018    Sore throat 02/06/2018    Special screening for malignant neoplasm of prostate 11/30/2017    Type 2 diabetes, uncontrolled, with neuropathy 09/27/2017    DM typeII with peripheral neuropathy uncontrolled    URI, acute 02/06/2018    Well adult exam 11/30/2017       Social History     Socioeconomic History    Marital status:      Spouse name: Brenda    Number of children: 2    Years of education: 12   Tobacco Use     Smoking status: Never     Passive exposure: Never    Smokeless tobacco: Never   Vaping Use    Vaping status: Never Used   Substance and Sexual Activity    Alcohol use: No    Drug use: No    Sexual activity: Yes     Partners: Female     Birth control/protection: None       Family History   Problem Relation Age of Onset    Diabetes Mother     Heart disease Mother     Hypertension Mother     Heart disease Father     Diabetes Brother     Cancer Brother        No Known Allergies    ROS:   Constitutional:  Denies fatigue, tiredness.    Eyes:  Denies change in visual acuity   HENT:  Denies nasal congestion or sore throat   Respiratory: denies cough, shortness of breath.   Cardiovascular:  denies chest pain, edema   GI:  Denies abdominal pain, nausea, vomiting.   Musculoskeletal:  Denies back pain or joint pain   Integument:  Denies dry skin and rash   Neurologic:  Denies headache, focal weakness or sensory changes   Endocrine:  Denies polyuria or polydipsia   Psychiatric:  Denies depression or anxiety      Vitals:    11/01/24 1047   BP: 132/70     BMI: 43.3  Physical Exam:  GEN: NAD, conversant, obese  EYES: EOMI,  NECK: no thyromegaly,   CV: RRR,   LUNG: CTA  PSYCH: AOX3, appropriate mood, affect normal  Feet: Has calluses on both feet, no ulcer  LE: Has wound on LLE.       Results Review:     I reviewed the patient's new clinical results.    Lab Results   Component Value Date    HGBA1C 6.2 (H) 10/01/2024    HGBA1C 5.9 (H) 08/17/2023    HGBA1C 6.1 (H) 06/27/2023      Lab Results   Component Value Date    GLUCOSE 125 (H) 10/01/2024    BUN 13 10/01/2024    CREATININE 1.02 10/01/2024    EGFRIFNONA 77 11/09/2021    EGFRIFAFRI 89 11/09/2021    BCR 13 10/01/2024    K 4.8 10/01/2024    CO2 22 10/01/2024    CALCIUM 9.5 10/01/2024    PROTENTOTREF 6.5 10/01/2024    ALBUMIN 3.8 (L) 10/01/2024    LABIL2 1.6 08/17/2023    AST 23 10/01/2024    ALT 17 10/01/2024    CHOL 91 05/11/2021    TRIG 187 (H) 10/01/2024    LDL 41 10/01/2024     HDL 33 (L) 10/01/2024     Lab Results   Component Value Date    TSH 3.51 12/01/2017         Medication Review: Reviewed.       Current Outpatient Medications:     ammonium lactate (Lac-Hydrin) 12 % cream, Apply  topically to the appropriate area as directed Daily. Both lower legs, Disp: 385 g, Rfl: 2    atorvastatin (LIPITOR) 10 MG tablet, Take 1 tablet by mouth once daily, Disp: 90 tablet, Rfl: 3    azelastine (OPTIVAR) 0.05 % ophthalmic solution, INSTILL 1 DROP INTO EACH EYE 1 TO 2 TIMES DAILY, Disp: , Rfl:     FREESTYLE LITE test strip, 1 each by Other route 2 (Two) Times a Day. to check glucose, Disp: , Rfl:     gabapentin (NEURONTIN) 600 MG tablet, Take 1 tablet by mouth 4 times daily, Disp: 120 tablet, Rfl: 5    glimepiride (AMARYL) 2 MG tablet, Take 1 tablet by mouth twice daily, Disp: 180 tablet, Rfl: 4    Insulin Lispro Prot & Lispro (humaLOG 75-25) (75-25) 100 UNIT/ML suspension pen-injector pen, Inject 15 Units under the skin into the appropriate area as directed 2 (Two) Times a Day With Meals., Disp: 15 mL, Rfl: 5    Insulin Pen Needle (BD Pen Needle Supriya 2nd Gen) 32G X 4 MM misc, USE 1 EACH TWICE DAILY, Disp: 200 each, Rfl: 3    Invokana 100 MG tablet tablet, Take 1 tablet by mouth once daily, Disp: 90 tablet, Rfl: 0    lisinopril (PRINIVIL,ZESTRIL) 20 MG tablet, Take 1 tablet by mouth Daily., Disp: , Rfl:     SITagliptin-metFORMIN HCl ER (Janumet XR)  MG tablet, Take 2 tablets by mouth once daily, Disp: 180 tablet, Rfl: 2      Assessment & Plan   1.  Diabetes mellitus type 2 with Hyperglycemia: Well-controlled with A1c at 6.2%.  He will definitely benefit from GLP-1 agonist therapy, he has never tried it.  At this time he is interested in trying GLP-1 agonist therapy.  I will add Mounjaro 2.5 mg subcu weekly for 4 weeks and if able to tolerate then increase the dose to 5 mg subcu weekly.  Will DC Janumet, will DC glimepiride, and add metformin  mg twice a day and continue Invokana 100 mg  p.o. daily along with Humalog mix 75/25 insulin at 15 units subcu twice a day.  Recommend to watch for low blood sugars and if the blood sugar starts coming below 100 then we will consider cutting down the insulin dose.  Recommend to always keep glucose source and continue to work on diet and activity.      2.  Hypertension: Well-controlled, continue current medications.    3.  Hyperlipidemia: On atorvastatin, no recent labs will check lipid panel.    4.  Class III obesity with BMI of 43.3: Encouraged to continue to work on diet and activity.    5.  Diabetic peripheral neuropathy: Has a wound on the left lower extremity, will refer to the wound management clinic.    Assessment and plan from April 8, 2024 reviewed and updated.            Tono Allison MD FACE.    Much of the above report is an electronic transcription/translation of the spoken language to printed text using Dragon Software. As such, the subtleties and finesse of the spoken language may permit erroneous, or at times, nonsensical words or phrases to be inadvertently transcribed; thus changes may be made at a later date to rectify these errors.

## 2024-11-01 NOTE — PATIENT INSTRUCTIONS
JIMENA Janumet and glimepiride  Start Mounjaro 2.5 mg subcu weekly for 4 weeks and if able to tolerate then increase the dose to 5 mg subcu weekly  Start metformin  mg, 1 tablet twice a day  Arrange for wound clinic consultation for your left lower extremity wound  Continue to work on diet and activity  Always keep glucose source in case of low blood sugar  Labs before follow-up.

## 2024-11-05 ENCOUNTER — LAB REQUISITION (OUTPATIENT)
Dept: LAB | Facility: HOSPITAL | Age: 69
End: 2024-11-05
Payer: MEDICARE

## 2024-11-05 ENCOUNTER — OFFICE VISIT (OUTPATIENT)
Dept: WOUND CARE | Facility: HOSPITAL | Age: 69
End: 2024-11-05
Payer: MEDICARE

## 2024-11-05 DIAGNOSIS — L97.822 NON-PRESSURE CHRONIC ULCER OF OTHER PART OF LEFT LOWER LEG WITH FAT LAYER EXPOSED: ICD-10-CM

## 2024-11-05 PROCEDURE — 87070 CULTURE OTHR SPECIMN AEROBIC: CPT | Performed by: NURSE PRACTITIONER

## 2024-11-05 PROCEDURE — 87147 CULTURE TYPE IMMUNOLOGIC: CPT | Performed by: NURSE PRACTITIONER

## 2024-11-05 PROCEDURE — G0463 HOSPITAL OUTPT CLINIC VISIT: HCPCS

## 2024-11-05 PROCEDURE — 87205 SMEAR GRAM STAIN: CPT | Performed by: NURSE PRACTITIONER

## 2024-11-07 LAB
BACTERIA SPEC AEROBE CULT: ABNORMAL
BACTERIA SPEC AEROBE CULT: ABNORMAL
GRAM STN SPEC: ABNORMAL

## 2024-11-08 RX ORDER — BLOOD-GLUCOSE METER
1 KIT MISCELLANEOUS 2 TIMES DAILY
Qty: 100 EACH | Refills: 0 | Status: SHIPPED | OUTPATIENT
Start: 2024-11-08

## 2024-11-12 ENCOUNTER — OFFICE VISIT (OUTPATIENT)
Dept: WOUND CARE | Facility: HOSPITAL | Age: 69
End: 2024-11-12
Payer: MEDICARE

## 2024-11-19 ENCOUNTER — OFFICE VISIT (OUTPATIENT)
Dept: WOUND CARE | Facility: HOSPITAL | Age: 69
End: 2024-11-19
Payer: MEDICARE

## 2024-11-26 ENCOUNTER — APPOINTMENT (OUTPATIENT)
Dept: WOUND CARE | Facility: HOSPITAL | Age: 69
End: 2024-11-26
Payer: MEDICARE

## 2024-12-03 ENCOUNTER — OFFICE VISIT (OUTPATIENT)
Dept: WOUND CARE | Facility: HOSPITAL | Age: 69
End: 2024-12-03
Payer: MEDICARE

## 2024-12-16 ENCOUNTER — OFFICE VISIT (OUTPATIENT)
Dept: FAMILY MEDICINE CLINIC | Facility: CLINIC | Age: 69
End: 2024-12-16
Payer: MEDICARE

## 2024-12-16 VITALS
SYSTOLIC BLOOD PRESSURE: 128 MMHG | OXYGEN SATURATION: 95 % | WEIGHT: 291 LBS | TEMPERATURE: 98.4 F | BODY MASS INDEX: 41.66 KG/M2 | HEIGHT: 70 IN | HEART RATE: 71 BPM | DIASTOLIC BLOOD PRESSURE: 69 MMHG

## 2024-12-16 DIAGNOSIS — Z00.01 ENCOUNTER FOR ROUTINE ADULT MEDICAL EXAM WITH ABNORMAL FINDINGS: Primary | ICD-10-CM

## 2024-12-16 PROCEDURE — 1125F AMNT PAIN NOTED PAIN PRSNT: CPT | Performed by: FAMILY MEDICINE

## 2024-12-16 PROCEDURE — 1160F RVW MEDS BY RX/DR IN RCRD: CPT | Performed by: FAMILY MEDICINE

## 2024-12-16 PROCEDURE — 3044F HG A1C LEVEL LT 7.0%: CPT | Performed by: FAMILY MEDICINE

## 2024-12-16 PROCEDURE — 3074F SYST BP LT 130 MM HG: CPT | Performed by: FAMILY MEDICINE

## 2024-12-16 PROCEDURE — 1170F FXNL STATUS ASSESSED: CPT | Performed by: FAMILY MEDICINE

## 2024-12-16 PROCEDURE — 3078F DIAST BP <80 MM HG: CPT | Performed by: FAMILY MEDICINE

## 2024-12-16 PROCEDURE — 1159F MED LIST DOCD IN RCRD: CPT | Performed by: FAMILY MEDICINE

## 2024-12-16 PROCEDURE — G0439 PPPS, SUBSEQ VISIT: HCPCS | Performed by: FAMILY MEDICINE

## 2024-12-16 NOTE — PROGRESS NOTES
Subjective   The ABCs of the Annual Wellness Visit  Medicare Wellness Visit      Haider Holland is a 69 y.o. patient who presents for a Medicare Wellness Visit.    The following portions of the patient's history were reviewed and   updated as appropriate: allergies, current medications, past family history, past medical history, past social history, past surgical history, and problem list.    Compared to one year ago, the patient's physical   health is the same.  Compared to one year ago, the patient's mental   health is the same.    Recent Hospitalizations:  He was not admitted to the hospital during the last year.     Current Medical Providers:  Patient Care Team:  Cuca Aquino MD as PCP - General (Family Medicine)    Outpatient Medications Prior to Visit   Medication Sig Dispense Refill    ammonium lactate (Lac-Hydrin) 12 % cream Apply  topically to the appropriate area as directed Daily. Both lower legs 385 g 2    atorvastatin (LIPITOR) 10 MG tablet Take 1 tablet by mouth once daily 90 tablet 3    azelastine (OPTIVAR) 0.05 % ophthalmic solution INSTILL 1 DROP INTO EACH EYE 1 TO 2 TIMES DAILY      Canagliflozin (Invokana) 100 MG tablet tablet Take 1 tablet by mouth Daily. 90 tablet 0    FREESTYLE LITE test strip USE 1 STRIP TO CHECK GLUCOSE TWICE DAILY 100 each 0    gabapentin (NEURONTIN) 600 MG tablet Take 1 tablet by mouth 4 times daily 120 tablet 5    Insulin Lispro Prot & Lispro (humaLOG 75-25) (75-25) 100 UNIT/ML suspension pen-injector pen Inject 15 Units under the skin into the appropriate area as directed 2 (Two) Times a Day With Meals. 15 mL 5    Insulin Pen Needle (BD Pen Needle Supriya 2nd Gen) 32G X 4 MM misc USE 1 EACH TWICE DAILY 200 each 3    lisinopril (PRINIVIL,ZESTRIL) 20 MG tablet Take 1 tablet by mouth Daily.      metFORMIN ER (GLUCOPHAGE-XR) 500 MG 24 hr tablet Take 1 tablet twice a day. 60 tablet 6    Tirzepatide (Mounjaro) 2.5 MG/0.5ML solution auto-injector Inject 2.5 mg under the  skin into the appropriate area as directed 1 (One) Time Per Week.      Tirzepatide (Mounjaro) 5 MG/0.5ML solution auto-injector Inject 5 mg under the skin into the appropriate area as directed 1 (One) Time Per Week.       No facility-administered medications prior to visit.     No opioid medication identified on active medication list. I have reviewed chart for other potential  high risk medication/s and harmful drug interactions in the elderly.      Aspirin is not on active medication list.  Aspirin use is not indicated based on review of current medical condition/s. Risk of harm outweighs potential benefits.  .    Patient Active Problem List   Diagnosis    Abnormal liver function tests    Class 3 severe obesity due to excess calories without serious comorbidity with body mass index (BMI) of 40.0 to 44.9 in adult    Coronary artery disease    Diabetic peripheral neuropathy associated with type 2 diabetes mellitus    Edema of lower extremity    Mixed hyperlipidemia    Hypertension, benign    Neuropathy    Type 2 diabetes mellitus with hyperglycemia, with long-term current use of insulin    PAD (peripheral artery disease)    History of colon polyps    Mild nonproliferative diabetic retinopathy without macular edema associated with type 2 diabetes mellitus    Diabetic dermopathy associated with type 2 diabetes mellitus    Venous insufficiency of both lower extremities    Arthritis of right glenohumeral joint    Diabetic peripheral neuropathy    Osteoarthritis of knee    Restless legs    Ureteric stone    Obesity    Coronary arteriosclerosis    Hypertensive disorder    Knee pain    Mild aortic stenosis by prior echocardiogram    Other spondylosis with radiculopathy, lumbar region    Chronic midline low back pain    Follow-up exam     Advance Care Planning Advance Directive is not on file.  ACP discussion was held with the patient during this visit. Patient does not have an advance directive, declines further  "assistance.            Objective   Vitals:    24 0836   BP: 128/69   BP Location: Right arm   Patient Position: Sitting   Cuff Size: Adult   Pulse: 71   Temp: 98.4 °F (36.9 °C)   TempSrc: Infrared   SpO2: 95%   Weight: 132 kg (291 lb)   Height: 177.8 cm (70\")   PainSc:   4       Estimated body mass index is 41.75 kg/m² as calculated from the following:    Height as of this encounter: 177.8 cm (70\").    Weight as of this encounter: 132 kg (291 lb).    Class 3 Severe Obesity (BMI >=40). Obesity-related health conditions include the following: hypertension, coronary heart disease, diabetes mellitus, dyslipidemias, and osteoarthritis. Obesity is unchanged. BMI is is above average; BMI management plan is completed. We discussed portion control and increasing exercise.       Does the patient have evidence of cognitive impairment? No  Lab Results   Component Value Date    CHLPL 105 10/01/2024    TRIG 187 (H) 10/01/2024    HDL 33 (L) 10/01/2024    LDL 41 10/01/2024    VLDL 31 10/01/2024    HGBA1C 6.2 (H) 10/01/2024                                                                                                Health  Risk Assessment    Smoking Status:  Social History     Tobacco Use   Smoking Status Never    Passive exposure: Never   Smokeless Tobacco Never     Alcohol Consumption:  Social History     Substance and Sexual Activity   Alcohol Use No       Fall Risk Screen  STEADI Fall Risk Assessment was completed, and patient is at LOW risk for falls.Assessment completed on:2024    Depression Screening   Little interest or pleasure in doing things? Not at all   Feeling down, depressed, or hopeless? Not at all   PHQ-2 Total Score 0      Health Habits and Functional and Cognitive Screenin/16/2024     8:39 AM   Functional & Cognitive Status   Do you have difficulty preparing food and eating? No   Do you have difficulty bathing yourself, getting dressed or grooming yourself? No   Do you have difficulty using " the toilet? No   Do you have difficulty moving around from place to place? No   Do you have trouble with steps or getting out of a bed or a chair? Yes   Current Diet Well Balanced Diet   Dental Exam Not up to date   Eye Exam Not up to date   Exercise (times per week) 3 times per week   Current Exercises Include Walking   Do you need help using the phone?  No   Are you deaf or do you have serious difficulty hearing?  Yes   Do you need help to go to places out of walking distance? Yes   Do you need help shopping? Yes   Do you need help preparing meals?  No   Do you need help with housework?  No   Do you need help with laundry? No   Do you need help taking your medications? No   Do you need help managing money? No   Do you ever drive or ride in a car without wearing a seat belt? No   Have you felt unusual stress, anger or loneliness in the last month? No   Who do you live with? Spouse   If you need help, do you have trouble finding someone available to you? No   Have you been bothered in the last four weeks by sexual problems? No   Do you have difficulty concentrating, remembering or making decisions? Yes           Age-appropriate Screening Schedule:  Refer to the list below for future screening recommendations based on patient's age, sex and/or medical conditions. Orders for these recommended tests are listed in the plan section. The patient has been provided with a written plan.    Health Maintenance List  Health Maintenance   Topic Date Due    TDAP/TD VACCINES (1 - Tdap) Never done    DIABETIC EYE EXAM  07/10/2024    DIABETIC FOOT EXAM  07/25/2024    BMI FOLLOWUP  12/13/2024    HEMOGLOBIN A1C  04/01/2025    COLORECTAL CANCER SCREENING  07/22/2025    LIPID PANEL  10/01/2025    ANNUAL WELLNESS VISIT  12/16/2025    HEPATITIS C SCREENING  Completed    COVID-19 Vaccine  Completed    INFLUENZA VACCINE  Completed    Pneumococcal Vaccine 65+  Completed    ZOSTER VACCINE  Completed    URINE MICROALBUMIN  Discontinued                                                                                                                                                 CMS Preventative Services Quick Reference  Risk Factors Identified During Encounter  None Identified    The above risks/problems have been discussed with the patient.  Pertinent information has been shared with the patient in the After Visit Summary.  An After Visit Summary and PPPS were made available to the patient.    Follow Up:   Next Medicare Wellness visit to be scheduled in 1 year.         Additional E&M Note during same encounter follows:  Patient has additional, significant, and separately identifiable condition(s)/problem(s) that require work above and beyond the Medicare Wellness Visit     Chief Complaint  Medicare Wellness-subsequent    Subjective    HPI         The patient presents for a Medicare checkup.    He reports no significant changes in his health status compared to the previous year. He has not required any hospitalizations within the past year. He underwent a colonoscopy approximately 2 to 3 years ago and is due for a follow-up procedure with Dr. Khan.    He has been managing a chronic leg wound for the past 4 years, which necessitated a visit to the wound center on 11/01/2024. A sample from the wound was sent for analysis, revealing the presence of bacteria. He received treatment at the wound center for a duration of 4 to 5 weeks, during which the wound was regularly dressed. The wound has since improved, and he was advised to apply Vaseline to the area. He was also prescribed an antibiotic regimen, taking two tablets daily for a week, followed by one tablet daily. His diabetes medications, Janumet and glipizide, were discontinued.    MEDICATIONS  Discontinued: Janumet, glipizide          Objective   Vital Signs:  /69 (BP Location: Right arm, Patient Position: Sitting, Cuff Size: Adult)   Pulse 71   Temp 98.4 °F (36.9 °C) (Infrared)   Ht 177.8 cm  "(70\")   Wt 132 kg (291 lb)   SpO2 95%   BMI 41.75 kg/m²   Physical Exam  Constitutional:       Appearance: He is well-developed.      Comments:      HENT:      Head: Normocephalic and atraumatic.   Eyes:      Conjunctiva/sclera: Conjunctivae normal.   Cardiovascular:      Rate and Rhythm: Normal rate and regular rhythm.      Heart sounds: Murmur (4/6) heard.   Pulmonary:      Effort: Pulmonary effort is normal.   Musculoskeletal:         General: Normal range of motion.      Cervical back: Normal range of motion.      Right lower leg: Edema present.      Left lower leg: Edema present.      Comments: Bilateral compression socks in place   Skin:     General: Skin is warm and dry.      Findings: No rash.   Neurological:      Mental Status: He is alert and oriented to person, place, and time.   Psychiatric:         Behavior: Behavior normal.         Heart rhythm is normal.    Vital Signs  Blood pressure is 128/69.            Results           Diagnoses and all orders for this visit:    1. Encounter for routine adult medical exam with abnormal findings (Primary)           Assessment and Plan        1. Medicare checkup.  His blood pressure is well-regulated at 128/69. He has successfully completed his Medicare checkup.    2. Chronic leg wound.  He has been receiving treatment at the wound center for a chronic leg wound that has been present for about 4 years. A sample was sent for analysis, and bacteria were found. He was treated with antibiotics and wound care, including wrapping and the application of Vaseline. The wound has shown improvement. He is advised to continue applying Vaseline and keep the wound dry.    3. Diabetes mellitus.  His diabetes specialist has discontinued Janumet and glipizide. The specialist is monitoring his blood work, so no additional blood work is needed at this time.    PROCEDURE  The patient underwent a colonoscopy approximately 2 to 3 years ago.    Keep follow-up with his specialists per " their recommendations.            Follow Up   No follow-ups on file.  Patient was given instructions and counseling regarding his condition or for health maintenance advice. Please see specific information pulled into the AVS if appropriate.  Patient or patient representative verbalized consent for the use of Ambient Listening during the visit with  Cuca Aquino MD for chart documentation. 12/16/2024  08:57 EST

## 2024-12-30 RX ORDER — BLOOD-GLUCOSE METER
1 KIT MISCELLANEOUS 2 TIMES DAILY
Qty: 100 EACH | Refills: 0 | Status: SHIPPED | OUTPATIENT
Start: 2024-12-30

## 2025-02-08 DIAGNOSIS — E11.65 TYPE 2 DIABETES MELLITUS WITH HYPERGLYCEMIA, WITH LONG-TERM CURRENT USE OF INSULIN: Primary | ICD-10-CM

## 2025-02-08 DIAGNOSIS — Z79.4 TYPE 2 DIABETES MELLITUS WITH HYPERGLYCEMIA, WITH LONG-TERM CURRENT USE OF INSULIN: Primary | ICD-10-CM

## 2025-02-10 RX ORDER — CANAGLIFLOZIN 100 MG/1
1 TABLET, FILM COATED ORAL DAILY
Qty: 90 TABLET | Refills: 3 | Status: SHIPPED | OUTPATIENT
Start: 2025-02-10

## 2025-02-11 DIAGNOSIS — E11.65 UNCONTROLLED TYPE 2 DIABETES MELLITUS WITH HYPERGLYCEMIA: Primary | ICD-10-CM

## 2025-02-11 RX ORDER — BLOOD-GLUCOSE METER
1 KIT MISCELLANEOUS 2 TIMES DAILY
Qty: 100 EACH | Refills: 6 | Status: SHIPPED | OUTPATIENT
Start: 2025-02-11

## 2025-03-07 DIAGNOSIS — E11.42 DIABETIC PERIPHERAL NEUROPATHY ASSOCIATED WITH TYPE 2 DIABETES MELLITUS: ICD-10-CM

## 2025-03-07 RX ORDER — GABAPENTIN 600 MG/1
TABLET ORAL
Qty: 120 TABLET | Refills: 2 | Status: SHIPPED | OUTPATIENT
Start: 2025-03-07

## 2025-04-22 RX ORDER — LISINOPRIL 20 MG/1
20 TABLET ORAL DAILY
Qty: 90 TABLET | Refills: 0 | Status: SHIPPED | OUTPATIENT
Start: 2025-04-22

## 2025-06-10 ENCOUNTER — OFFICE VISIT (OUTPATIENT)
Dept: ENDOCRINOLOGY | Facility: CLINIC | Age: 70
End: 2025-06-10
Payer: MEDICARE

## 2025-06-10 VITALS
WEIGHT: 288 LBS | DIASTOLIC BLOOD PRESSURE: 70 MMHG | OXYGEN SATURATION: 98 % | SYSTOLIC BLOOD PRESSURE: 130 MMHG | HEIGHT: 70 IN | HEART RATE: 64 BPM | BODY MASS INDEX: 41.23 KG/M2

## 2025-06-10 DIAGNOSIS — E78.2 MIXED HYPERLIPIDEMIA: ICD-10-CM

## 2025-06-10 DIAGNOSIS — E11.42 DIABETIC PERIPHERAL NEUROPATHY: ICD-10-CM

## 2025-06-10 DIAGNOSIS — Z79.4 TYPE 2 DIABETES MELLITUS WITH HYPERGLYCEMIA, WITH LONG-TERM CURRENT USE OF INSULIN: Primary | ICD-10-CM

## 2025-06-10 DIAGNOSIS — E66.813 CLASS 3 SEVERE OBESITY DUE TO EXCESS CALORIES WITHOUT SERIOUS COMORBIDITY WITH BODY MASS INDEX (BMI) OF 40.0 TO 44.9 IN ADULT: ICD-10-CM

## 2025-06-10 DIAGNOSIS — E11.65 TYPE 2 DIABETES MELLITUS WITH HYPERGLYCEMIA, WITH LONG-TERM CURRENT USE OF INSULIN: Primary | ICD-10-CM

## 2025-06-10 DIAGNOSIS — E66.01 CLASS 3 SEVERE OBESITY DUE TO EXCESS CALORIES WITHOUT SERIOUS COMORBIDITY WITH BODY MASS INDEX (BMI) OF 40.0 TO 44.9 IN ADULT: ICD-10-CM

## 2025-06-10 DIAGNOSIS — I10 HYPERTENSION, BENIGN: ICD-10-CM

## 2025-06-10 PROCEDURE — 99214 OFFICE O/P EST MOD 30 MIN: CPT | Performed by: INTERNAL MEDICINE

## 2025-06-10 RX ORDER — SITAGLIPTIN AND METFORMIN HYDROCHLORIDE 1000; 50 MG/1; MG/1
2 TABLET, FILM COATED, EXTENDED RELEASE ORAL DAILY
Qty: 60 TABLET | Refills: 6 | Status: SHIPPED | OUTPATIENT
Start: 2025-06-10

## 2025-06-10 NOTE — PATIENT INSTRUCTIONS
DC metformin  Start Janumet XR 50/1000, 1 tablet twice a day  Always keep glucose source in case of low blood sugar  Continue rest of the medication  Labs before follow-up.

## 2025-06-10 NOTE — PROGRESS NOTES
Endocrine Progress Note Outpatient     Patient Care Team:  Cuca Aquino MD as PCP - General (Family Medicine)    Chief Complaint: Follow up type 2 diabetes    HPI: 69-year-old male with history of type 2 diabetes, hypertension, hyperlipidemia and obesity is here for follow-up.    For type 2 diabetes he is currently on metformin  mg twice a day, Invokana 100 mg p.o. daily and Humalog mix 75/25 insulin at 15 units subcu twice a day.  He did bring in blood sugar records for review most of them are running between 150-200.  He was prescribed Mounjaro but was not covered.  Before that he was on Janumet which was working very well with regards to diabetes.      Hypertension: Well-controlled.    Hyperlipidemia: On atorvastatin.    Obesity: He is advised to continue to work on his diet and activity.    Diabetic peripheral neuropathy: Stable.     Past Medical History:   Diagnosis Date    Abnormal liver function tests 09/27/2017    Bilateral lower extremity edema 02/05/2019    BMI 40.0-44.9, adult 02/05/2019    CAD (coronary artery disease) 10/24/2017    Cellulitis of leg, right 04/02/2019    Chest pain 10/24/2017    Cough 02/06/2018    Diabetes mellitus, type II 10/24/2017    Hyperlipidemia 09/27/2017    Hypertension, benign 09/27/2017    MI (myocardial infarction) 2008    Neuropathy 08/07/2018    Peripheral neuropathy     Screening for colon cancer 02/05/2019    Screening PSA (prostate specific antigen) 02/05/2019    Skin abscess 09/18/2018    Sore throat 02/06/2018    Special screening for malignant neoplasm of prostate 11/30/2017    Type 2 diabetes, uncontrolled, with neuropathy 09/27/2017    DM typeII with peripheral neuropathy uncontrolled    URI, acute 02/06/2018    Well adult exam 11/30/2017       Social History     Socioeconomic History    Marital status:      Spouse name: Brenda    Number of children: 2    Years of education: 12   Tobacco Use    Smoking status: Never     Passive exposure:  Never    Smokeless tobacco: Never   Vaping Use    Vaping status: Never Used   Substance and Sexual Activity    Alcohol use: No    Drug use: No    Sexual activity: Yes     Partners: Female     Birth control/protection: None       Family History   Problem Relation Age of Onset    Diabetes Mother     Heart disease Mother     Hypertension Mother     Heart disease Father     Diabetes Brother     Cancer Brother        No Known Allergies    ROS:   Constitutional:  Denies fatigue, tiredness.    Eyes:  Denies change in visual acuity   HENT:  Denies nasal congestion or sore throat   Respiratory: denies cough, shortness of breath.   Cardiovascular:  denies chest pain, edema   GI:  Denies abdominal pain, nausea, vomiting.   Musculoskeletal:  Denies back pain or joint pain   Integument:  Denies dry skin and rash   Neurologic:  Denies headache, focal weakness or sensory changes   Endocrine:  Denies polyuria or polydipsia   Psychiatric:  Denies depression or anxiety      Vitals:    06/10/25 1037   BP: 130/70   Pulse: 64   SpO2: 98%     BMI: 41.3  Physical Exam:  GEN: NAD, conversant, obese  EYES: EOMI,  NECK: no thyromegaly,   CV: RRR,   LUNG: CTA  PSYCH: AOX3, appropriate mood, affect normal  Feet: Has calluses on both feet, no ulcer  LE: Has wound on LLE.       Results Review:     I reviewed the patient's new clinical results.    Lab Results   Component Value Date    HGBA1C 6.2 (H) 10/01/2024    HGBA1C 5.9 (H) 08/17/2023    HGBA1C 6.1 (H) 06/27/2023      Lab Results   Component Value Date    GLUCOSE 125 (H) 10/01/2024    BUN 13 10/01/2024    CREATININE 1.02 10/01/2024    EGFRIFNONA 77 11/09/2021    EGFRIFAFRI 89 11/09/2021    BCR 13 10/01/2024    K 4.8 10/01/2024    CO2 22 10/01/2024    CALCIUM 9.5 10/01/2024    ALBUMIN 3.8 (L) 10/01/2024    AST 23 10/01/2024    ALT 17 10/01/2024    CHOL 91 05/11/2021    TRIG 187 (H) 10/01/2024    LDL 41 10/01/2024    HDL 33 (L) 10/01/2024     Lab Results   Component Value Date    TSH 3.51  12/01/2017         Medication Review: Reviewed.       Current Outpatient Medications:     ammonium lactate (Lac-Hydrin) 12 % cream, Apply  topically to the appropriate area as directed Daily. Both lower legs, Disp: 385 g, Rfl: 2    atorvastatin (LIPITOR) 10 MG tablet, Take 1 tablet by mouth once daily, Disp: 90 tablet, Rfl: 3    azelastine (OPTIVAR) 0.05 % ophthalmic solution, INSTILL 1 DROP INTO EACH EYE 1 TO 2 TIMES DAILY, Disp: , Rfl:     Canagliflozin (Invokana) 100 MG tablet tablet, Take 1 tablet by mouth once daily, Disp: 90 tablet, Rfl: 3    gabapentin (NEURONTIN) 600 MG tablet, Take 1 tablet by mouth 4 times daily, Disp: 120 tablet, Rfl: 2    glucose blood (FREESTYLE LITE) test strip, USE 1 STRIP TO CHECK GLUCOSE TWICE DAILY, Disp: 100 each, Rfl: 6    Insulin Lispro Prot & Lispro (humaLOG 75-25) (75-25) 100 UNIT/ML suspension pen-injector pen, Inject 15 Units under the skin into the appropriate area as directed 2 (Two) Times a Day With Meals., Disp: 15 mL, Rfl: 5    Insulin Pen Needle (BD Pen Needle Supriya 2nd Gen) 32G X 4 MM misc, USE 1 EACH TWICE DAILY, Disp: 200 each, Rfl: 3    lisinopril (PRINIVIL,ZESTRIL) 20 MG tablet, Take 1 tablet by mouth once daily, Disp: 90 tablet, Rfl: 0    metFORMIN ER (GLUCOPHAGE-XR) 500 MG 24 hr tablet, Take 1 tablet twice a day., Disp: 60 tablet, Rfl: 6    Tirzepatide (Mounjaro) 2.5 MG/0.5ML solution auto-injector, Inject 2.5 mg under the skin into the appropriate area as directed 1 (One) Time Per Week. (Patient not taking: Reported on 6/10/2025), Disp: , Rfl:     Tirzepatide (Mounjaro) 5 MG/0.5ML solution auto-injector, Inject 5 mg under the skin into the appropriate area as directed 1 (One) Time Per Week. (Patient not taking: Reported on 6/10/2025), Disp: , Rfl:       Assessment & Plan   1.  Diabetes mellitus type 2 with Hyperglycemia: Uncontrolled with high blood sugars, will DC metformin and add Janumet XR 50/1000, 1 tablet twice a day.  Continue Invokana with insulin and  continue to follow diet and activity and always keep glucose source in case of low blood sugars.    2.  Hypertension: Well-controlled, continue current medications.    3.  Hyperlipidemia: On atorvastatin, no recent labs will check lipid panel.    4.  Class III obesity with BMI of 43.3: Encouraged to continue to work on diet and activity.    5.  Diabetic peripheral neuropathy: Has a wound on the left lower extremity, will refer to the wound management clinic.    Assessment and plan from November 1, 2024 reviewed and updated.            Tono Allison MD FACE.    Much of the above report is an electronic transcription/translation of the spoken language to printed text using Dragon Software. As such, the subtleties and finesse of the spoken language may permit erroneous, or at times, nonsensical words or phrases to be inadvertently transcribed; thus changes may be made at a later date to rectify these errors.

## 2025-06-24 DIAGNOSIS — E11.42 DIABETIC PERIPHERAL NEUROPATHY ASSOCIATED WITH TYPE 2 DIABETES MELLITUS: ICD-10-CM

## 2025-06-24 RX ORDER — GABAPENTIN 600 MG/1
600 TABLET ORAL 4 TIMES DAILY
Qty: 120 TABLET | Refills: 2 | Status: SHIPPED | OUTPATIENT
Start: 2025-06-24

## 2025-07-23 RX ORDER — LISINOPRIL 20 MG/1
20 TABLET ORAL DAILY
Qty: 90 TABLET | Refills: 3 | Status: SHIPPED | OUTPATIENT
Start: 2025-07-23

## 2025-07-23 RX ORDER — ATORVASTATIN CALCIUM 10 MG/1
10 TABLET, FILM COATED ORAL DAILY
Qty: 90 TABLET | Refills: 3 | Status: SHIPPED | OUTPATIENT
Start: 2025-07-23

## 2025-07-24 ENCOUNTER — EXTERNAL PBMM DATA (OUTPATIENT)
Dept: PHARMACY | Facility: OTHER | Age: 70
End: 2025-07-24

## 2025-08-06 ENCOUNTER — OFFICE VISIT (OUTPATIENT)
Dept: FAMILY MEDICINE CLINIC | Facility: CLINIC | Age: 70
End: 2025-08-06
Payer: MEDICARE

## 2025-08-06 VITALS
HEART RATE: 93 BPM | HEIGHT: 70 IN | TEMPERATURE: 98.6 F | BODY MASS INDEX: 40.52 KG/M2 | SYSTOLIC BLOOD PRESSURE: 103 MMHG | WEIGHT: 283 LBS | DIASTOLIC BLOOD PRESSURE: 54 MMHG | OXYGEN SATURATION: 96 %

## 2025-08-06 DIAGNOSIS — T78.40XA ALLERGIC REACTION, INITIAL ENCOUNTER: Primary | ICD-10-CM

## 2025-08-06 RX ORDER — PREDNISONE 5 MG/1
TABLET ORAL
Qty: 20 TABLET | Refills: 0 | Status: SHIPPED | OUTPATIENT
Start: 2025-08-06 | End: 2025-08-14

## 2025-08-06 RX ORDER — CETIRIZINE HYDROCHLORIDE 10 MG/1
TABLET ORAL
Qty: 14 TABLET | Refills: 0 | Status: SHIPPED | OUTPATIENT
Start: 2025-08-06

## 2025-08-29 RX ORDER — INSULIN LISPRO 100 [IU]/ML
INJECTION, SUSPENSION SUBCUTANEOUS
Qty: 15 ML | Refills: 3 | Status: SHIPPED | OUTPATIENT
Start: 2025-08-29